# Patient Record
Sex: MALE | Race: OTHER | HISPANIC OR LATINO | URBAN - METROPOLITAN AREA
[De-identification: names, ages, dates, MRNs, and addresses within clinical notes are randomized per-mention and may not be internally consistent; named-entity substitution may affect disease eponyms.]

---

## 2014-02-01 RX ORDER — LOSARTAN POTASSIUM 100 MG/1
1 TABLET, FILM COATED ORAL
Qty: 0 | Refills: 0 | DISCHARGE
Start: 2014-02-01

## 2014-02-01 RX ORDER — CARVEDILOL PHOSPHATE 80 MG/1
1 CAPSULE, EXTENDED RELEASE ORAL
Qty: 0 | Refills: 0 | DISCHARGE
Start: 2014-02-01

## 2018-01-10 ENCOUNTER — INPATIENT (INPATIENT)
Facility: HOSPITAL | Age: 58
LOS: 7 days | Discharge: HOME | End: 2018-01-18
Attending: INTERNAL MEDICINE | Admitting: INTERNAL MEDICINE
Payer: MEDICAID

## 2018-01-10 VITALS
SYSTOLIC BLOOD PRESSURE: 119 MMHG | HEART RATE: 65 BPM | TEMPERATURE: 98 F | DIASTOLIC BLOOD PRESSURE: 66 MMHG | RESPIRATION RATE: 16 BRPM | OXYGEN SATURATION: 100 %

## 2018-01-10 DIAGNOSIS — I25.10 ATHEROSCLEROTIC HEART DISEASE OF NATIVE CORONARY ARTERY WITHOUT ANGINA PECTORIS: ICD-10-CM

## 2018-01-10 DIAGNOSIS — Z29.9 ENCOUNTER FOR PROPHYLACTIC MEASURES, UNSPECIFIED: ICD-10-CM

## 2018-01-10 DIAGNOSIS — E78.5 HYPERLIPIDEMIA, UNSPECIFIED: ICD-10-CM

## 2018-01-10 DIAGNOSIS — R55 SYNCOPE AND COLLAPSE: ICD-10-CM

## 2018-01-10 DIAGNOSIS — I10 ESSENTIAL (PRIMARY) HYPERTENSION: ICD-10-CM

## 2018-01-10 DIAGNOSIS — R06.02 SHORTNESS OF BREATH: ICD-10-CM

## 2018-01-10 LAB
ALBUMIN SERPL ELPH-MCNC: 4.4 G/DL — SIGNIFICANT CHANGE UP (ref 3.3–5)
ALP SERPL-CCNC: 63 U/L — SIGNIFICANT CHANGE UP (ref 40–120)
ALT FLD-CCNC: 19 U/L — SIGNIFICANT CHANGE UP (ref 4–41)
APTT BLD: 37.6 SEC — HIGH (ref 27.5–37.4)
AST SERPL-CCNC: 22 U/L — SIGNIFICANT CHANGE UP (ref 4–40)
BASOPHILS # BLD AUTO: 0.04 K/UL — SIGNIFICANT CHANGE UP (ref 0–0.2)
BASOPHILS NFR BLD AUTO: 0.6 % — SIGNIFICANT CHANGE UP (ref 0–2)
BILIRUB SERPL-MCNC: 0.8 MG/DL — SIGNIFICANT CHANGE UP (ref 0.2–1.2)
BUN SERPL-MCNC: 19 MG/DL — SIGNIFICANT CHANGE UP (ref 7–23)
CALCIUM SERPL-MCNC: 9.1 MG/DL — SIGNIFICANT CHANGE UP (ref 8.4–10.5)
CHLORIDE SERPL-SCNC: 101 MMOL/L — SIGNIFICANT CHANGE UP (ref 98–107)
CK MB BLD-MCNC: 1.12 NG/ML — SIGNIFICANT CHANGE UP (ref 1–6.6)
CK MB BLD-MCNC: 1.16 NG/ML — SIGNIFICANT CHANGE UP (ref 1–6.6)
CK MB BLD-MCNC: SIGNIFICANT CHANGE UP (ref 0–2.5)
CK SERPL-CCNC: 76 U/L — SIGNIFICANT CHANGE UP (ref 30–200)
CK SERPL-CCNC: 78 U/L — SIGNIFICANT CHANGE UP (ref 30–200)
CO2 SERPL-SCNC: 27 MMOL/L — SIGNIFICANT CHANGE UP (ref 22–31)
CREAT SERPL-MCNC: 1.22 MG/DL — SIGNIFICANT CHANGE UP (ref 0.5–1.3)
D DIMER BLD IA.RAPID-MCNC: < 150 NG/ML — SIGNIFICANT CHANGE UP
EOSINOPHIL # BLD AUTO: 0.14 K/UL — SIGNIFICANT CHANGE UP (ref 0–0.5)
EOSINOPHIL NFR BLD AUTO: 2.1 % — SIGNIFICANT CHANGE UP (ref 0–6)
GLUCOSE SERPL-MCNC: 90 MG/DL — SIGNIFICANT CHANGE UP (ref 70–99)
HCT VFR BLD CALC: 41.3 % — SIGNIFICANT CHANGE UP (ref 39–50)
HGB BLD-MCNC: 13.7 G/DL — SIGNIFICANT CHANGE UP (ref 13–17)
IMM GRANULOCYTES # BLD AUTO: 0.02 # — SIGNIFICANT CHANGE UP
IMM GRANULOCYTES NFR BLD AUTO: 0.3 % — SIGNIFICANT CHANGE UP (ref 0–1.5)
INR BLD: 1.1 — SIGNIFICANT CHANGE UP (ref 0.88–1.17)
LYMPHOCYTES # BLD AUTO: 1.56 K/UL — SIGNIFICANT CHANGE UP (ref 1–3.3)
LYMPHOCYTES # BLD AUTO: 23.6 % — SIGNIFICANT CHANGE UP (ref 13–44)
MCHC RBC-ENTMCNC: 28.5 PG — SIGNIFICANT CHANGE UP (ref 27–34)
MCHC RBC-ENTMCNC: 33.2 % — SIGNIFICANT CHANGE UP (ref 32–36)
MCV RBC AUTO: 86 FL — SIGNIFICANT CHANGE UP (ref 80–100)
MONOCYTES # BLD AUTO: 0.53 K/UL — SIGNIFICANT CHANGE UP (ref 0–0.9)
MONOCYTES NFR BLD AUTO: 8 % — SIGNIFICANT CHANGE UP (ref 2–14)
NEUTROPHILS # BLD AUTO: 4.33 K/UL — SIGNIFICANT CHANGE UP (ref 1.8–7.4)
NEUTROPHILS NFR BLD AUTO: 65.4 % — SIGNIFICANT CHANGE UP (ref 43–77)
NRBC # FLD: 0 — SIGNIFICANT CHANGE UP
NT-PROBNP SERPL-SCNC: 21.16 PG/ML — SIGNIFICANT CHANGE UP
PLATELET # BLD AUTO: 190 K/UL — SIGNIFICANT CHANGE UP (ref 150–400)
PMV BLD: 12.3 FL — SIGNIFICANT CHANGE UP (ref 7–13)
POTASSIUM SERPL-MCNC: 4.6 MMOL/L — SIGNIFICANT CHANGE UP (ref 3.5–5.3)
POTASSIUM SERPL-SCNC: 4.6 MMOL/L — SIGNIFICANT CHANGE UP (ref 3.5–5.3)
PROT SERPL-MCNC: 7.1 G/DL — SIGNIFICANT CHANGE UP (ref 6–8.3)
PROTHROM AB SERPL-ACNC: 12.2 SEC — SIGNIFICANT CHANGE UP (ref 9.8–13.1)
RBC # BLD: 4.8 M/UL — SIGNIFICANT CHANGE UP (ref 4.2–5.8)
RBC # FLD: 12.8 % — SIGNIFICANT CHANGE UP (ref 10.3–14.5)
SODIUM SERPL-SCNC: 138 MMOL/L — SIGNIFICANT CHANGE UP (ref 135–145)
TROPONIN T SERPL-MCNC: < 0.06 NG/ML — SIGNIFICANT CHANGE UP (ref 0–0.06)
TROPONIN T SERPL-MCNC: < 0.06 NG/ML — SIGNIFICANT CHANGE UP (ref 0–0.06)
WBC # BLD: 6.62 K/UL — SIGNIFICANT CHANGE UP (ref 3.8–10.5)
WBC # FLD AUTO: 6.62 K/UL — SIGNIFICANT CHANGE UP (ref 3.8–10.5)

## 2018-01-10 PROCEDURE — 71046 X-RAY EXAM CHEST 2 VIEWS: CPT | Mod: 26

## 2018-01-10 PROCEDURE — 70450 CT HEAD/BRAIN W/O DYE: CPT | Mod: 26

## 2018-01-10 RX ORDER — HEPARIN SODIUM 5000 [USP'U]/ML
5000 INJECTION INTRAVENOUS; SUBCUTANEOUS EVERY 8 HOURS
Qty: 0 | Refills: 0 | Status: DISCONTINUED | OUTPATIENT
Start: 2018-01-10 | End: 2018-01-18

## 2018-01-10 RX ORDER — SODIUM CHLORIDE 9 MG/ML
3 INJECTION INTRAMUSCULAR; INTRAVENOUS; SUBCUTANEOUS EVERY 8 HOURS
Qty: 0 | Refills: 0 | Status: DISCONTINUED | OUTPATIENT
Start: 2018-01-10 | End: 2018-01-18

## 2018-01-10 RX ORDER — SIMVASTATIN 20 MG/1
40 TABLET, FILM COATED ORAL AT BEDTIME
Qty: 0 | Refills: 0 | Status: DISCONTINUED | OUTPATIENT
Start: 2018-01-10 | End: 2018-01-18

## 2018-01-10 RX ORDER — CARVEDILOL PHOSPHATE 80 MG/1
12.5 CAPSULE, EXTENDED RELEASE ORAL EVERY 12 HOURS
Qty: 0 | Refills: 0 | Status: DISCONTINUED | OUTPATIENT
Start: 2018-01-10 | End: 2018-01-18

## 2018-01-10 RX ORDER — LOSARTAN POTASSIUM 100 MG/1
100 TABLET, FILM COATED ORAL
Qty: 0 | Refills: 0 | Status: DISCONTINUED | OUTPATIENT
Start: 2018-01-10 | End: 2018-01-18

## 2018-01-10 RX ORDER — FUROSEMIDE 40 MG
20 TABLET ORAL DAILY
Qty: 0 | Refills: 0 | Status: DISCONTINUED | OUTPATIENT
Start: 2018-01-10 | End: 2018-01-18

## 2018-01-10 RX ORDER — AMLODIPINE BESYLATE 2.5 MG/1
5 TABLET ORAL
Qty: 0 | Refills: 0 | Status: DISCONTINUED | OUTPATIENT
Start: 2018-01-10 | End: 2018-01-18

## 2018-01-10 RX ORDER — CLOPIDOGREL BISULFATE 75 MG/1
75 TABLET, FILM COATED ORAL DAILY
Qty: 0 | Refills: 0 | Status: DISCONTINUED | OUTPATIENT
Start: 2018-01-10 | End: 2018-01-18

## 2018-01-10 RX ORDER — ASPIRIN/CALCIUM CARB/MAGNESIUM 324 MG
81 TABLET ORAL DAILY
Qty: 0 | Refills: 0 | Status: DISCONTINUED | OUTPATIENT
Start: 2018-01-10 | End: 2018-01-18

## 2018-01-10 RX ORDER — ISOSORBIDE MONONITRATE 60 MG/1
30 TABLET, EXTENDED RELEASE ORAL
Qty: 0 | Refills: 0 | Status: DISCONTINUED | OUTPATIENT
Start: 2018-01-10 | End: 2018-01-18

## 2018-01-10 RX ORDER — FAMOTIDINE 10 MG/ML
20 INJECTION INTRAVENOUS DAILY
Qty: 0 | Refills: 0 | Status: DISCONTINUED | OUTPATIENT
Start: 2018-01-10 | End: 2018-01-18

## 2018-01-10 RX ORDER — ACETAMINOPHEN 500 MG
650 TABLET ORAL ONCE
Qty: 0 | Refills: 0 | Status: DISCONTINUED | OUTPATIENT
Start: 2018-01-10 | End: 2018-01-18

## 2018-01-10 RX ADMIN — SIMVASTATIN 40 MILLIGRAM(S): 20 TABLET, FILM COATED ORAL at 22:28

## 2018-01-10 RX ADMIN — CARVEDILOL PHOSPHATE 12.5 MILLIGRAM(S): 80 CAPSULE, EXTENDED RELEASE ORAL at 22:28

## 2018-01-10 RX ADMIN — SODIUM CHLORIDE 3 MILLILITER(S): 9 INJECTION INTRAMUSCULAR; INTRAVENOUS; SUBCUTANEOUS at 23:08

## 2018-01-10 RX ADMIN — HEPARIN SODIUM 5000 UNIT(S): 5000 INJECTION INTRAVENOUS; SUBCUTANEOUS at 22:29

## 2018-01-10 RX ADMIN — LOSARTAN POTASSIUM 100 MILLIGRAM(S): 100 TABLET, FILM COATED ORAL at 22:28

## 2018-01-10 NOTE — ED ADULT NURSE NOTE - OBJECTIVE STATEMENT
Pt presents to room 16, A&OX3, ambulatory at baseline without assistance, coming in for evaluation of dyspnea on exertion, bilateral arm and leg numbness/tingling/weakness x 3 weeks, chest pain intermittently for 3 weeks, and dizziness x 3 days. pt has a hx of HTN, CAD s/p stent x 1, cardiac cath x 4, and HLD. States dizziness has gotten progressively worse and he is now having trouble ambulating. IV established in left ac with a 20g, labs drawn and sent, call bell in reach, side rails up, bed in locked position, md evaluation in progress, pt on telemetry-NSR @ 66 noted, will continue to monitor.

## 2018-01-10 NOTE — ED PROVIDER NOTE - OBJECTIVE STATEMENT
58 yo male with PMHX HTN, CAD stented x4/5, ~ EF 23%, HLD presents to the ED c/o dizziness, and generalized weakness of his UE and LE b/l for 3 weeks worsening over the past 3 days. Pt saw his PCP, lab work was done and patient was told it was normal, Pt referred to see his cardiologist, next Monday, but did not want to wait to see them. Pt denies chest pain, sob, cough, fever, chills, syncope, abdominal pain, diarrhea, urinary symptoms, 56 yo male with PMHx HTN, CAD stented x4/5, ~ EF 23%, HLD presents to the ED c/o dizziness, and generalized weakness of his UE and LE b/l for 3 weeks worsening over the past 3 days. Pt saw his PCP, lab work was done and patient was told it was normal, Pt referred to see his cardiologist, next Monday, but did not want to wait to see them. Pt denies chest pain, sob, cough, fever, chills, syncope, abdominal pain, diarrhea, urinary symptoms, recent travel. 56 yo male with PMHx HTN, CAD stented x4/5, ~ EF 23%, HLD presents to the ED c/o dizziness, and generalized weakness of his UE and LE b/l for 3 weeks worsening over the past 3 days. Pt states dizziness comes when he is sitting or standing, comes intermittently with no exacerbating, or relieving factors. "random". Pt saw his PCP, lab work was done and patient was told it was normal, Pt referred to see his cardiologist, next Monday, but did not want to wait to see them. Pt denies chest pain, sob, cough, fever, chills, syncope, abdominal pain, diarrhea, urinary symptoms, recent travel. 56 yo male with PMHx HTN, CAD stented x4/5, ~ EF 23%, HLD presents to the ED c/o dizziness, and generalized weakness of his UE and LE b/l for 3 weeks worsening over the past 3 days. Pt states dizziness comes when he is sitting or standing, comes intermittently with no exacerbating, or relieving factors. "random". Pt saw his PCP, lab work was done and patient was told it was normal, Pt referred to see his cardiologist, next Monday, but did not want to wait to see them. Pt denies chest pain, sob, cough, fever, chills, syncope, abdominal pain, diarrhea, urinary symptoms, recent travel.  Cardiologist John Cain

## 2018-01-10 NOTE — H&P ADULT - PROBLEM SELECTOR PLAN 1
Admit to tele  check cbc, bmp, a1c, flp, tsh, trend CE  Echo ordered   Check orthostatics  Fall risk  f/u CTH results  Neuro checks  f/u MD note

## 2018-01-10 NOTE — ED ADULT TRIAGE NOTE - CHIEF COMPLAINT QUOTE
Pt c/o dizziness and numbness to b/l arms and legs x 3 weeks. Saw primary care MD and had normal work up. Pt was advised to go to cardiologist for stress test but pt stated he couldn't wait. C/O intermittent CP with SOB. Denies any n/v. HX cardiac stents

## 2018-01-10 NOTE — ED PROVIDER NOTE - PHYSICAL EXAMINATION
neuro exam normal:  CN II-XII normal.  5/5 UE and LE strength.  Rapid alternating movements intact.  Negative Rhomberg.  Negative Pronator drift.  Ambulatory without assistance.  Sensation intact to light touch.

## 2018-01-10 NOTE — H&P ADULT - NSHPSOCIALHISTORY_GEN_ALL_CORE
Previous smoker. Quit Smoking 16 years ago. Smoked for approximately for 20 years 1-2 PPD every 2-3 days.   Denies alcohol or drug use.     Works as a delivery malik.

## 2018-01-10 NOTE — H&P ADULT - FAMILY HISTORY
Family history of prostate cancer in father     Mother  Still living? Unknown  Family history of heart disease, Age at diagnosis: Age Unknown

## 2018-01-10 NOTE — ED PROVIDER NOTE - MEDICAL DECISION MAKING DETAILS
56 yo male with PMHx HTN, CAD stented x4/5, ~ EF 23%, HLD presents to the ED c/o dizziness, and generalized weakness of his UE and LE b/l for 3 weeks worsening over the past 3 days. NO focal neuro deficits.   Plan: ekg, exr, cbc, cmp, trop, ck,ckmb, monitor. Reassess.

## 2018-01-10 NOTE — H&P ADULT - NSHPLABSRESULTS_GEN_ALL_CORE
EKG: NSR 63 BPM, LBBB (Old), TWI in I, II, III, V6                          13.7   6.62  )-----------( 190      ( 10 Erick 2018 11:20 )             41.3   01-10    138  |  101  |  19  ----------------------------<  90  4.6   |  27  |  1.22    Ca    9.1      10 Erick 2018 11:20    TPro  7.1  /  Alb  4.4  /  TBili  0.8  /  DBili  x   /  AST  22  /  ALT  19  /  AlkPhos  63  01-10  CARDIAC MARKERS ( 10 Erick 2018 11:20 )  x     / < 0.06 ng/mL / 76 u/L / 1.16 ng/mL / x        < from: Xray Chest 2 Views PA/Lat (01.10.18 @ 11:27) >    IMPRESSION:  Clear lungs.No pleural effusions or pneumothorax.    Cardiac and mediastinal silhouettes within normal limits.    Trachea midline.    Unremarkable osseous structures.      < end of copied text >

## 2018-01-10 NOTE — ED PROVIDER NOTE - ATTENDING CONTRIBUTION TO CARE
Case of a 58 y/o male patient with past medical history of HTN, CAD stented x4/5, ~ EF 23%, HLD presenting to the ED complaining of dizziness, and generalized weakness. Patient explains that he gets tired when he goes up the stairs, like a fatigues feeling. Exam was within normal limits as well as labs. As patient is a high risk for ACS due to complaints and history will admit for further workup and management.

## 2018-01-10 NOTE — H&P ADULT - PMH
CAD (Coronary Artery Disease)    Coronary Stent  to LAD and D2 in 1/2011  proximal LCx, Distal LCx,  OM2, ramus intermedius, and diagonal stents  HTN (Hypertension)    Hyperlipidemia

## 2018-01-10 NOTE — H&P ADULT - ASSESSMENT
56 y/o M with hx of HTN, CAD s/p proximal LCx, Distal LCx,  OM2, ramus intermedius, and diagonal stents, HLD presents with dizziness x 2-3 days. admitted for near syncope / r/o ACS

## 2018-01-10 NOTE — H&P ADULT - ATTENDING COMMENTS
Patient seen and examined.  Agree with above.   -ELMO with 3 sets CE  -CHeck TTE  -Neuro eval with Dr. Franklin  -further workup including ischemic eval pending above    Sophie Pollard MD  [facility]  [address] [place] [address], [id number]  [place] [address]  office: [phone]  pager: [phone]

## 2018-01-10 NOTE — H&P ADULT - HISTORY OF PRESENT ILLNESS
58 y/o M with hx of HTN, CAD s/p proximal LCx, Distal LCx,  OM2, ramus intermedius, and diagonal stents, HLD presents with dizziness x 2-3 days. Patient states dizziness is intermittent, and randomly. He cannot recall what exacerbates or relieves the dizziness. He also noticed that he has SOB with exertion with 1 flight of stairs or walking up to 1 block x 2-3 weeks, which is new for him. He also complains of b/l LE and UE weakness with the dizziness. He saw his PMD for these symptoms and had normal blood work. He planned to see his cardiologist on Monday, but the dizziness was worsening so he came to ED for further eval. Denies fever, chills, cough, falls, LOC, chest pain, melena, hematochezia, abdominal pain, diarrhea, constipation, LE edema, calf tenderness or dysuria. 56 y/o M with hx of HTN, CAD s/p proximal LCx, Distal LCx,  OM2, ramus intermedius, and diagonal stents, HLD presents with dizziness x 2-3 days. Patient states dizziness is intermittent, and random onset. He cannot recall what exacerbates or relieves the dizziness. He also noticed that he has SOB with exertion with 1 flight of stairs or walking up to 1 block x 2-3 weeks, which is new for him. He also complains of b/l LE and UE weakness with the dizziness. He saw his PMD for these symptoms and had normal blood work. He planned to see his cardiologist on Monday, but the dizziness was worsening so he came to ED for further eval. Denies fever, chills, cough, falls, LOC, chest pain, melena, hematochezia, abdominal pain, diarrhea, constipation, LE edema, calf tenderness or dysuria.

## 2018-01-10 NOTE — H&P ADULT - PROBLEM SELECTOR PLAN 2
Admit to tele to r/o ACS  Check cbc, bmp, a1c, flp, tsh, trend CE, D-Dimer and Pro BNP  Appears euvolemic on exam  Echo ordered   NST v Cardiac cath pending echo results  cont asa, plavix

## 2018-01-10 NOTE — ED ADULT NURSE NOTE - PMH
CAD (Coronary Artery Disease)    Coronary Stent  to LAD and D2 in 1/2011  HTN (Hypertension)    Hyperlipidemia

## 2018-01-11 LAB
BASOPHILS # BLD AUTO: 0.04 K/UL — SIGNIFICANT CHANGE UP (ref 0–0.2)
BASOPHILS NFR BLD AUTO: 0.6 % — SIGNIFICANT CHANGE UP (ref 0–2)
BUN SERPL-MCNC: 19 MG/DL — SIGNIFICANT CHANGE UP (ref 7–23)
CALCIUM SERPL-MCNC: 9.3 MG/DL — SIGNIFICANT CHANGE UP (ref 8.4–10.5)
CHLORIDE SERPL-SCNC: 102 MMOL/L — SIGNIFICANT CHANGE UP (ref 98–107)
CHOLEST SERPL-MCNC: 124 MG/DL — SIGNIFICANT CHANGE UP (ref 120–199)
CO2 SERPL-SCNC: 26 MMOL/L — SIGNIFICANT CHANGE UP (ref 22–31)
CREAT SERPL-MCNC: 1.26 MG/DL — SIGNIFICANT CHANGE UP (ref 0.5–1.3)
EOSINOPHIL # BLD AUTO: 0.2 K/UL — SIGNIFICANT CHANGE UP (ref 0–0.5)
EOSINOPHIL NFR BLD AUTO: 2.9 % — SIGNIFICANT CHANGE UP (ref 0–6)
GLUCOSE SERPL-MCNC: 103 MG/DL — HIGH (ref 70–99)
HBA1C BLD-MCNC: 5.8 % — HIGH (ref 4–5.6)
HCT VFR BLD CALC: 44.1 % — SIGNIFICANT CHANGE UP (ref 39–50)
HDLC SERPL-MCNC: 43 MG/DL — SIGNIFICANT CHANGE UP (ref 35–55)
HGB BLD-MCNC: 15 G/DL — SIGNIFICANT CHANGE UP (ref 13–17)
IMM GRANULOCYTES # BLD AUTO: 0.03 # — SIGNIFICANT CHANGE UP
IMM GRANULOCYTES NFR BLD AUTO: 0.4 % — SIGNIFICANT CHANGE UP (ref 0–1.5)
LIPID PNL WITH DIRECT LDL SERPL: 64 MG/DL — SIGNIFICANT CHANGE UP
LYMPHOCYTES # BLD AUTO: 1.58 K/UL — SIGNIFICANT CHANGE UP (ref 1–3.3)
LYMPHOCYTES # BLD AUTO: 22.5 % — SIGNIFICANT CHANGE UP (ref 13–44)
MAGNESIUM SERPL-MCNC: 2.1 MG/DL — SIGNIFICANT CHANGE UP (ref 1.6–2.6)
MCHC RBC-ENTMCNC: 29.5 PG — SIGNIFICANT CHANGE UP (ref 27–34)
MCHC RBC-ENTMCNC: 34 % — SIGNIFICANT CHANGE UP (ref 32–36)
MCV RBC AUTO: 86.8 FL — SIGNIFICANT CHANGE UP (ref 80–100)
MONOCYTES # BLD AUTO: 0.54 K/UL — SIGNIFICANT CHANGE UP (ref 0–0.9)
MONOCYTES NFR BLD AUTO: 7.7 % — SIGNIFICANT CHANGE UP (ref 2–14)
NEUTROPHILS # BLD AUTO: 4.62 K/UL — SIGNIFICANT CHANGE UP (ref 1.8–7.4)
NEUTROPHILS NFR BLD AUTO: 65.9 % — SIGNIFICANT CHANGE UP (ref 43–77)
NRBC # FLD: 0 — SIGNIFICANT CHANGE UP
PHOSPHATE SERPL-MCNC: 3.6 MG/DL — SIGNIFICANT CHANGE UP (ref 2.5–4.5)
PLATELET # BLD AUTO: 193 K/UL — SIGNIFICANT CHANGE UP (ref 150–400)
PMV BLD: 12 FL — SIGNIFICANT CHANGE UP (ref 7–13)
POTASSIUM SERPL-MCNC: 4.3 MMOL/L — SIGNIFICANT CHANGE UP (ref 3.5–5.3)
POTASSIUM SERPL-SCNC: 4.3 MMOL/L — SIGNIFICANT CHANGE UP (ref 3.5–5.3)
RBC # BLD: 5.08 M/UL — SIGNIFICANT CHANGE UP (ref 4.2–5.8)
RBC # FLD: 12.8 % — SIGNIFICANT CHANGE UP (ref 10.3–14.5)
SODIUM SERPL-SCNC: 142 MMOL/L — SIGNIFICANT CHANGE UP (ref 135–145)
TRIGL SERPL-MCNC: 137 MG/DL — SIGNIFICANT CHANGE UP (ref 10–149)
TSH SERPL-MCNC: 2.6 UIU/ML — SIGNIFICANT CHANGE UP (ref 0.27–4.2)
WBC # BLD: 7.01 K/UL — SIGNIFICANT CHANGE UP (ref 3.8–10.5)
WBC # FLD AUTO: 7.01 K/UL — SIGNIFICANT CHANGE UP (ref 3.8–10.5)

## 2018-01-11 PROCEDURE — 93306 TTE W/DOPPLER COMPLETE: CPT | Mod: 26

## 2018-01-11 RX ORDER — ACETAMINOPHEN 500 MG
650 TABLET ORAL EVERY 6 HOURS
Qty: 0 | Refills: 0 | Status: DISCONTINUED | OUTPATIENT
Start: 2018-01-11 | End: 2018-01-18

## 2018-01-11 RX ADMIN — SODIUM CHLORIDE 3 MILLILITER(S): 9 INJECTION INTRAMUSCULAR; INTRAVENOUS; SUBCUTANEOUS at 23:05

## 2018-01-11 RX ADMIN — FAMOTIDINE 20 MILLIGRAM(S): 10 INJECTION INTRAVENOUS at 11:42

## 2018-01-11 RX ADMIN — ISOSORBIDE MONONITRATE 30 MILLIGRAM(S): 60 TABLET, EXTENDED RELEASE ORAL at 11:42

## 2018-01-11 RX ADMIN — SIMVASTATIN 40 MILLIGRAM(S): 20 TABLET, FILM COATED ORAL at 23:06

## 2018-01-11 RX ADMIN — CLOPIDOGREL BISULFATE 75 MILLIGRAM(S): 75 TABLET, FILM COATED ORAL at 11:42

## 2018-01-11 RX ADMIN — AMLODIPINE BESYLATE 5 MILLIGRAM(S): 2.5 TABLET ORAL at 11:42

## 2018-01-11 RX ADMIN — HEPARIN SODIUM 5000 UNIT(S): 5000 INJECTION INTRAVENOUS; SUBCUTANEOUS at 05:57

## 2018-01-11 RX ADMIN — Medication 650 MILLIGRAM(S): at 18:48

## 2018-01-11 RX ADMIN — LOSARTAN POTASSIUM 100 MILLIGRAM(S): 100 TABLET, FILM COATED ORAL at 17:46

## 2018-01-11 RX ADMIN — Medication 20 MILLIGRAM(S): at 05:56

## 2018-01-11 RX ADMIN — CARVEDILOL PHOSPHATE 12.5 MILLIGRAM(S): 80 CAPSULE, EXTENDED RELEASE ORAL at 17:20

## 2018-01-11 RX ADMIN — Medication 81 MILLIGRAM(S): at 11:42

## 2018-01-11 RX ADMIN — Medication 650 MILLIGRAM(S): at 17:57

## 2018-01-11 RX ADMIN — HEPARIN SODIUM 5000 UNIT(S): 5000 INJECTION INTRAVENOUS; SUBCUTANEOUS at 14:42

## 2018-01-11 RX ADMIN — CARVEDILOL PHOSPHATE 12.5 MILLIGRAM(S): 80 CAPSULE, EXTENDED RELEASE ORAL at 05:57

## 2018-01-11 RX ADMIN — SODIUM CHLORIDE 3 MILLILITER(S): 9 INJECTION INTRAMUSCULAR; INTRAVENOUS; SUBCUTANEOUS at 11:39

## 2018-01-11 NOTE — CONSULT NOTE ADULT - SUBJECTIVE AND OBJECTIVE BOX
[facility] Neurological [facility]), [facility]        Patient is a 57y old  Male who presents with a chief complaint of Dizziness (10 Erick 2018 19:56)      HPI:  56 y/o M with hx of HTN, CAD s/p proximal LCx, Distal LCx,  OM2, ramus intermedius, and diagonal stents, HLD presents with dizziness x 2-3 days. Patient states dizziness is intermittent, and random onset. He cannot recall what exacerbates or relieves the dizziness. He also noticed that he has SOB with exertion with 1 flight of stairs or walking up to 1 block x 2-3 weeks, which is new for him. He also complains of b/l LE and UE weakness with the dizziness. He saw his PMD for these symptoms and had normal blood work. He planned to see his cardiologist on Monday, but the dizziness was worsening so he came to ED for further eval. Denies fever, chills, cough, falls, LOC, chest pain, melena, hematochezia, abdominal pain, diarrhea, constipation, LE edema, calf tenderness or dysuria. (10 Erick 2018 19:56)           *****PAST MEDICAL / Surgical  HISTORY:  PAST MEDICAL & SURGICAL HISTORY:  Hyperlipidemia  Coronary Stent: to LAD and D2 in 1/2011  proximal LCx, Distal LCx,  OM2, ramus intermedius, and diagonal stents  CAD (Coronary Artery Disease)  HTN (Hypertension)  Stented coronary artery: x4           *****FAMILY HISTORY:  FAMILY HISTORY:  Family history of prostate cancer in father  Family history of heart disease (Mother)           *****SOCIAL HISTORY:  Alcohol: None  Smoking: None         *****ALLERGIES:   Allergies    No Known Allergies    Intolerances             *****MEDICATIONS: current medication reviewed and documented.   MEDICATIONS  (STANDING):  amLODIPine   Tablet 5 milliGRAM(s) Oral <User Schedule>  aspirin enteric coated 81 milliGRAM(s) Oral daily  carvedilol 12.5 milliGRAM(s) Oral every 12 hours  clopidogrel Tablet 75 milliGRAM(s) Oral daily  famotidine    Tablet 20 milliGRAM(s) Oral daily  furosemide    Tablet 20 milliGRAM(s) Oral daily  heparin  Injectable 5000 Unit(s) SubCutaneous every 8 hours  isosorbide   mononitrate ER Tablet (IMDUR) 30 milliGRAM(s) Oral <User Schedule>  losartan 100 milliGRAM(s) Oral <User Schedule>  simvastatin 40 milliGRAM(s) Oral at bedtime  sodium chloride 0.9% lock flush 3 milliLiter(s) IV Push every 8 hours    MEDICATIONS  (PRN):  acetaminophen   Tablet. 650 milliGRAM(s) Oral once PRN Mild/moderate/severe pain           *****REVIEW OF SYSTEM:  GEN: no fever, no chills, no pain  RESP: no SOB, no cough, no sputum  CVS: no chest pain, no palpitations, no edema  GI: no abdominal pain, no nausea, no vomiting, no constipation, no diarrhea  : no dysurea, no frequency, no hematurea  Neuro: no headache, no dizziness  PSYCH: no anxiety, no depression  Derm : no itching, no rash         *****VITAL SIGNS:  T(C): 37 (01-11-18 @ 05:52), Max: 37 (01-11-18 @ 05:52)  HR: 64 (01-11-18 @ 05:52) (54 - 72)  BP: 123/76 (01-11-18 @ 05:52) (111/53 - 134/78)  RR: 18 (01-11-18 @ 05:52) (16 - 18)  SpO2: 99% (01-11-18 @ 05:52) (98% - 99%)  Wt(kg): --           *****PHYSICAL EXAM:  GEN: A&O X 3 , NAD , comfortable  HEENT: NCAT, PERRL, MMM, hearing intact  Neck: supple , no JVD  CVS: S1S2 , regular , No M/R/G appreciated  PULM: CTA B/L,  no W/R/R appreciated  ABD.: soft. non tender, non distended,  bowel sounds present  Extrem: intact pulses , no edema   Derm: No rash , no ecchymoses  PSYCH : normal mood,  no delusion not anxious      Alert oriented x 3   Attention comprehension are fair. Able to name, repeat, read without any difficulty.   Able to follow 3 step commands.     EOMI fundi not visualized,  VFF to confrontration  No facial asymmetry   Tongue is midline   Palate elevates symmetrically   Moving all 4 ext symmetrically no pronator drift.  Reflexes are symmetric throughout   sensation is grossly symmetric  Gait : not assessed.  B/L down going toes        >>> <<<         *****LAB AND IMAGING:                          15.0   7.01  )-----------( 193      ( 11 Jan 2018 05:50 )             44.1               01-11    142  |  102  |  19  ----------------------------<  103<H>  4.3   |  26  |  1.26    Ca    9.3      11 Jan 2018 05:50  Phos  3.6     01-11  Mg     2.1     01-11    TPro  7.1  /  Alb  4.4  /  TBili  0.8  /  DBili  x   /  AST  22  /  ALT  19  /  AlkPhos  63  01-10    PT/INR - ( 10 Erick 2018 11:20 )   PT: 12.2 SEC;   INR: 1.10          PTT - ( 10 Erick 2018 11:20 )  PTT:37.6 SEC            CARDIAC MARKERS ( 10 Erick 2018 21:04 )  x     / < 0.06 ng/mL / 78 u/L / 1.12 ng/mL / x      CARDIAC MARKERS ( 10 Erick 2018 11:20 )  x     / < 0.06 ng/mL / 76 u/L / 1.16 ng/mL / x                      [All pertinent recent Imaging reports reviewed]         *****A S S E S S M E N T   A N D   P L A N :            80 minutes spent on the total encounter;  more than 50 % of the visit was spent on counseling  and or coordinating care by the attending physician.    Thank you for allowing me to participate in the care of this hardik patient. Please do not hesitate to call me if you have any questions.   ___________________________  Will follow with you.  Thank you,  Valeri Franklin MD  Diplomate of the American Board of Neurology and Psychiatry.  Diplomate of the American Board of Vascular Neurology.   [facility] ([facility]), [facility]   Ph: [phone]      This note was partially created using voice recognition software and is inherently subject to errors including those of syntax and sound alike substitutions which may escape proofreading. In such instances original meaning may be extrapolated by contextual derivation. [facility]rological [facility]), [facility]        Patient is a 57y old  Male who presents with a chief complaint of Dizziness (10 Erick 2018 19:56)      HPI:  56 y/o M with hx of HTN, CAD s/p proximal LCx, Distal LCx,  OM2, ramus intermedius, and diagonal stents, HLD presents with dizziness x 2-3 days.  Patient states dizziness was brought on by exertional activity like going up stairs. He reports that his concern is that he loses power over all 4 ext, and is sob with exertion with 1 flight of stairs or walking up to 1 block x 2-3 weeks, which is new for him. He also complains of b/l LE and UE weakness with the dizziness. He saw his PMD for these symptoms and had normal blood work. He planned to see his cardiologist on Monday, but the dizziness was worsening so he came to ED for further eval. Denies fever, chills, cough, falls, LOC, chest pain, melena, hematochezia, abdominal pain, diarrhea, constipation, LE edema, calf tenderness or dysuria.  Denies any headache or other neurologic symptoms.          *****PAST MEDICAL / Surgical  HISTORY:  PAST MEDICAL & SURGICAL HISTORY:  Hyperlipidemia  Coronary Stent: to LAD and D2 in 1/2011  proximal LCx, Distal LCx,  OM2, ramus intermedius, and diagonal stents  CAD (Coronary Artery Disease)  HTN (Hypertension)  Stented coronary artery: x4           *****FAMILY HISTORY:  FAMILY HISTORY:  Family history of prostate cancer in father  Family history of heart disease (Mother)           *****SOCIAL HISTORY:  Alcohol: None  Smoking: None         *****ALLERGIES:   Allergies    No Known Allergies    Intolerances             *****MEDICATIONS: current medication reviewed and documented.   MEDICATIONS  (STANDING):  amLODIPine   Tablet 5 milliGRAM(s) Oral <User Schedule>  aspirin enteric coated 81 milliGRAM(s) Oral daily  carvedilol 12.5 milliGRAM(s) Oral every 12 hours  clopidogrel Tablet 75 milliGRAM(s) Oral daily  famotidine    Tablet 20 milliGRAM(s) Oral daily  furosemide    Tablet 20 milliGRAM(s) Oral daily  heparin  Injectable 5000 Unit(s) SubCutaneous every 8 hours  isosorbide   mononitrate ER Tablet (IMDUR) 30 milliGRAM(s) Oral <User Schedule>  losartan 100 milliGRAM(s) Oral <User Schedule>  simvastatin 40 milliGRAM(s) Oral at bedtime  sodium chloride 0.9% lock flush 3 milliLiter(s) IV Push every 8 hours    MEDICATIONS  (PRN):  acetaminophen   Tablet. 650 milliGRAM(s) Oral once PRN Mild/moderate/severe pain           *****REVIEW OF SYSTEM:  GEN: no fever, no chills, no pain  RESP: no SOB, no cough, no sputum  CVS: no chest pain, no palpitations, no edema  GI: no abdominal pain, no nausea, no vomiting, no constipation, no diarrhea  : no dysurea, no frequency, no hematurea  Neuro: no headache, no dizziness  PSYCH: no anxiety, no depression  Derm : no itching, no rash         *****VITAL SIGNS:  T(C): 37 (01-11-18 @ 05:52), Max: 37 (01-11-18 @ 05:52)  HR: 64 (01-11-18 @ 05:52) (54 - 72)  BP: 123/76 (01-11-18 @ 05:52) (111/53 - 134/78)  RR: 18 (01-11-18 @ 05:52) (16 - 18)  SpO2: 99% (01-11-18 @ 05:52) (98% - 99%)  Wt(kg): --           *****PHYSICAL EXAM:  GEN: A&O X 3 , NAD , comfortable  HEENT: NCAT, PERRL, MMM, hearing intact  Neck: supple , no JVD  CVS: S1S2 , regular , No M/R/G appreciated  PULM: CTA B/L,  no W/R/R appreciated  ABD.: soft. non tender, non distended,  bowel sounds present  Extrem: intact pulses , no edema   Derm: No rash , no ecchymoses  PSYCH : normal mood,  no delusion not anxious      Alert oriented x 3   Attention comprehension are fair. Able to name, repeat, read without any difficulty.   Able to follow 3 step commands.     EOMI fundi not visualized,  VFF to confrontration  No facial asymmetry   Tongue is midline   Palate elevates symmetrically   Moving all 4 ext symmetrically 4/4 through out no pronator drift.     sensation is grossly symmetric  Gait : able to tandem forward and back without any problems.   B/L down going toes        >>> <<<         *****LAB AND IMAGING:                          15.0   7.01  )-----------( 193      ( 11 Jan 2018 05:50 )             44.1               01-11    142  |  102  |  19  ----------------------------<  103<H>  4.3   |  26  |  1.26    Ca    9.3      11 Jan 2018 05:50  Phos  3.6     01-11  Mg     2.1     01-11    TPro  7.1  /  Alb  4.4  /  TBili  0.8  /  DBili  x   /  AST  22  /  ALT  19  /  AlkPhos  63  01-10    PT/INR - ( 10 Erick 2018 11:20 )   PT: 12.2 SEC;   INR: 1.10          PTT - ( 10 Erick 2018 11:20 )  PTT:37.6 SEC            CARDIAC MARKERS ( 10 Erick 2018 21:04 )  x     / < 0.06 ng/mL / 78 u/L / 1.12 ng/mL / x      CARDIAC MARKERS ( 10 Erick 2018 11:20 )  x     / < 0.06 ng/mL / 76 u/L / 1.16 ng/mL / x                  < from: CT Head No Cont (01.10.18 @ 17:17) >  IMPRESSION:    Normal non-contrast CT of the brain.    < end of copied text >      [All pertinent recent Imaging reports reviewed]         *****A S S E S S M E N T   A N D   P L A N :  56 y/o M with hx of HTN, CAD s/p proximal LCx, Distal LCx,  OM2, ramus intermedius, and diagonal stents, HLD presents with dizziness x 2-3 days.  Patient states dizziness which he describes as not vertigo, was brought on by exertional activity like going up stairs. He reports that his concern is that he loses power over all 4 ext, and is sob with exertion with 1 flight of stairs or walking up to 1 block x 2-3 weeks, which is new for him.     cardiac work up underway  Sinus girish on EKG.  appears to be exertional in nature. Since it is generalized weakness, does not localize to any particular hemisphere.  Will continue to monitor closely               80 minutes spent on the total encounter;  more than 50 % of the visit was spent on counseling  and or coordinating care by the attending physician.    Thank you for allowing me to participate in the care of this hardik patient. Please do not hesitate to call me if you have any questions.   ___________________________  Will follow with you.  Thank you,  Valeri Franklin MD  Diplomate of the American Board of Neurology and Psychiatry.  Diplomate of the American Board of Vascular Neurology.   [facility] ([facility]), [facility]   Ph: [phone]      This note was partially created using voice recognition software and is inherently subject to errors including those of syntax and sound alike substitutions which may escape proofreading. In such instances original meaning may be extrapolated by contextual derivation. [facility]rological [facility]), [facility]        Patient is a 57y old  Male who presents with a chief complaint of Dizziness (10 Erick 2018 19:56)      HPI:  58 y/o M with hx of HTN, CAD s/p proximal LCx, Distal LCx,  OM2, ramus intermedius, and diagonal stents, HLD presents with dizziness x 2-3 days.  Patient states dizziness was brought on by exertional activity like going up stairs. He reports that his concern is that he loses power over all 4 ext, and is sob with exertion with 1 flight of stairs or walking up to 1 block x 2-3 weeks, which is new for him. He also complains of b/l LE and UE weakness with the dizziness. He saw his PMD for these symptoms and had normal blood work. He planned to see his cardiologist on Monday, but the dizziness was worsening so he came to ED for further eval. Denies fever, chills, cough, falls, LOC, chest pain, melena, hematochezia, abdominal pain, diarrhea, constipation, LE edema, calf tenderness or dysuria.  Denies any headache or other neurologic symptoms.          *****PAST MEDICAL / Surgical  HISTORY:  PAST MEDICAL & SURGICAL HISTORY:  Hyperlipidemia  Coronary Stent: to LAD and D2 in 1/2011  proximal LCx, Distal LCx,  OM2, ramus intermedius, and diagonal stents  CAD (Coronary Artery Disease)  HTN (Hypertension)  Stented coronary artery: x4           *****FAMILY HISTORY:  FAMILY HISTORY:  Family history of prostate cancer in father  Family history of heart disease (Mother)           *****SOCIAL HISTORY:  Alcohol: None  Smoking: None         *****ALLERGIES:   Allergies    No Known Allergies    Intolerances             *****MEDICATIONS: current medication reviewed and documented.   MEDICATIONS  (STANDING):  amLODIPine   Tablet 5 milliGRAM(s) Oral <User Schedule>  aspirin enteric coated 81 milliGRAM(s) Oral daily  carvedilol 12.5 milliGRAM(s) Oral every 12 hours  clopidogrel Tablet 75 milliGRAM(s) Oral daily  famotidine    Tablet 20 milliGRAM(s) Oral daily  furosemide    Tablet 20 milliGRAM(s) Oral daily  heparin  Injectable 5000 Unit(s) SubCutaneous every 8 hours  isosorbide   mononitrate ER Tablet (IMDUR) 30 milliGRAM(s) Oral <User Schedule>  losartan 100 milliGRAM(s) Oral <User Schedule>  simvastatin 40 milliGRAM(s) Oral at bedtime  sodium chloride 0.9% lock flush 3 milliLiter(s) IV Push every 8 hours    MEDICATIONS  (PRN):  acetaminophen   Tablet. 650 milliGRAM(s) Oral once PRN Mild/moderate/severe pain           *****REVIEW OF SYSTEM:  GEN: no fever, no chills, no pain  RESP: no SOB, no cough, no sputum  CVS: no chest pain, no palpitations, no edema  GI: no abdominal pain, no nausea, no vomiting, no constipation, no diarrhea  : no dysurea, no frequency, no hematurea  Neuro: no headache, no dizziness  PSYCH: no anxiety, no depression  Derm : no itching, no rash         *****VITAL SIGNS:  T(C): 37 (01-11-18 @ 05:52), Max: 37 (01-11-18 @ 05:52)  HR: 64 (01-11-18 @ 05:52) (54 - 72)  BP: 123/76 (01-11-18 @ 05:52) (111/53 - 134/78)  RR: 18 (01-11-18 @ 05:52) (16 - 18)  SpO2: 99% (01-11-18 @ 05:52) (98% - 99%)  Wt(kg): --           *****PHYSICAL EXAM:  GEN: A&O X 3 , NAD , comfortable  HEENT: NCAT, PERRL, MMM, hearing intact  Neck: supple , no JVD  CVS: S1S2 , regular , No M/R/G appreciated  PULM: CTA B/L,  no W/R/R appreciated  ABD.: soft. non tender, non distended,  bowel sounds present  Extrem: intact pulses , no edema   Derm: No rash , no ecchymoses  PSYCH : normal mood,  no delusion not anxious      Alert oriented x 3   Attention comprehension are fair. Able to name, repeat, read without any difficulty.   Able to follow 3 step commands.     EOMI fundi not visualized,  VFF to confrontration  No facial asymmetry   Tongue is midline   Palate elevates symmetrically   Moving all 4 ext symmetrically 4/4 through out no pronator drift.     sensation is grossly symmetric  Gait : able to tandem forward and back without any problems.   B/L down going toes        >>> <<<         *****LAB AND IMAGING:                          15.0   7.01  )-----------( 193      ( 11 Jan 2018 05:50 )             44.1               01-11    142  |  102  |  19  ----------------------------<  103<H>  4.3   |  26  |  1.26    Ca    9.3      11 Jan 2018 05:50  Phos  3.6     01-11  Mg     2.1     01-11    TPro  7.1  /  Alb  4.4  /  TBili  0.8  /  DBili  x   /  AST  22  /  ALT  19  /  AlkPhos  63  01-10    PT/INR - ( 10 Erick 2018 11:20 )   PT: 12.2 SEC;   INR: 1.10          PTT - ( 10 Erick 2018 11:20 )  PTT:37.6 SEC            CARDIAC MARKERS ( 10 Erick 2018 21:04 )  x     / < 0.06 ng/mL / 78 u/L / 1.12 ng/mL / x      CARDIAC MARKERS ( 10 Erick 2018 11:20 )  x     / < 0.06 ng/mL / 76 u/L / 1.16 ng/mL / x                  < from: CT Head No Cont (01.10.18 @ 17:17) >  IMPRESSION:    Normal non-contrast CT of the brain.    < end of copied text >      [All pertinent recent Imaging reports reviewed]         *****A S S E S S M E N T   A N D   P L A N :  58 y/o M with hx of HTN, CAD s/p proximal LCx, Distal LCx,  OM2, ramus intermedius, and diagonal stents, HLD presents with dizziness x 2-3 days.  Patient states dizziness which he describes as not vertigo, was brought on by exertional activity like going up stairs. He reports that his concern is that he loses power over all 4 ext, and is sob with exertion with 1 flight of stairs or walking up to 1 block x 2-3 weeks, which is new for him.     cardiac work up underway  Sinus girish on EKG.  appears to be exertional in nature. Since it is generalized weakness, does not localize to any hemisphere although brainstem ischemia vs. c spine problem is a possibilty  Reviewed ct with special focus on the brainstem no abn seen.  Will continue to monitor closely.   If cardiac w/u is neg then can get further neurologic w/u mra of head to r/o post circulation problem., c-spine pathology             80 minutes spent on the total encounter;  more than 50 % of the visit was spent on counseling  and or coordinating care by the attending physician.    Thank you for allowing me to participate in the care of this hardik patient. Please do not hesitate to call me if you have any questions.   ___________________________  Will follow with you.  Thank you,  Valeri Franklin MD  Diplomate of the American Board of Neurology and Psychiatry.  Diplomate of the American Board of Vascular Neurology.   [facility] ([facility]), [facility]   Ph: [phone]      This note was partially created using voice recognition software and is inherently subject to errors including those of syntax and sound alike substitutions which may escape proofreading. In such instances original meaning may be extrapolated by contextual derivation.

## 2018-01-11 NOTE — PROGRESS NOTE ADULT - ATTENDING COMMENTS
Patient seen and examined.  Agree with above.  -TTE with severe LV dysfunction  -Given episodes of chest pain and severe LV dysfunction, will plan for cath tomorrow to further evaluate    Sophie Pollard MD  [facility]  [address] [place] [address], [id number]  [place] [address]  office: [phone]  pager: [phone]

## 2018-01-11 NOTE — CONSULT NOTE ADULT - ASSESSMENT
58 y/o M with hx of HTN, CAD s/p proximal LCx, Distal LCx,  OM2, ramus intermedius, and diagonal stents, HLD presents with dizziness x 2-3 days. admitted for near syncope / r/o ACS    Problem/Plan - 1:  ·  Problem: Near syncope.  Plan: TELEMONITOR  Cards and neuro fu  fall precautions  neuro checks  ischemia barragan as per cards  will follow     Problem/Plan - 2:  ·  Problem: Shortness of breath.  Plan: cont asa, plavix.   cards fu  ischemia barragan as per cards    Problem/Plan - 3:  ·  Problem: Hyperlipidemia.  Plan: cont statin.     Problem/Plan - 4:  ·  Problem: CAD (Coronary Artery Disease).  Plan: cont asa, plavix, statin, isosorbide.     Problem/Plan - 5:  ·  Problem: HTN (Hypertension).  Plan: cont home meds

## 2018-01-11 NOTE — PROGRESS NOTE ADULT - SUBJECTIVE AND OBJECTIVE BOX
SUBJECTIVE: no cp or SOB    MEDICATIONS  (STANDING):  amLODIPine   Tablet 5 milliGRAM(s) Oral <User Schedule>  aspirin enteric coated 81 milliGRAM(s) Oral daily  carvedilol 12.5 milliGRAM(s) Oral every 12 hours  clopidogrel Tablet 75 milliGRAM(s) Oral daily  famotidine    Tablet 20 milliGRAM(s) Oral daily  furosemide    Tablet 20 milliGRAM(s) Oral daily  heparin  Injectable 5000 Unit(s) SubCutaneous every 8 hours  isosorbide   mononitrate ER Tablet (IMDUR) 30 milliGRAM(s) Oral <User Schedule>  losartan 100 milliGRAM(s) Oral <User Schedule>  simvastatin 40 milliGRAM(s) Oral at bedtime  sodium chloride 0.9% lock flush 3 milliLiter(s) IV Push every 8 hours    LABS:                        15.0   7.01  )-----------( 193      ( 11 Jan 2018 05:50 )             44.1     142  |  102  |  19  ----------------------------<  103<H>  4.3   |  26  |  1.26    Ca    9.3      11 Jan 2018 05:50  Phos  3.6     01-11  Mg     2.1     01-11    TPro  7.1  /  Alb  4.4  /  TBili  0.8  /  DBili  x   /  AST  22  /  ALT  19  /  AlkPhos  63  01-10    CARDIAC MARKERS ( 10 Erick 2018 21:04 )  x     / < 0.06 ng/mL / 78 u/L / 1.12 ng/mL / x      CARDIAC MARKERS ( 10 Erick 2018 11:20 )  x     / < 0.06 ng/mL / 76 u/L / 1.16 ng/mL / x        Serum Pro-Brain Natriuretic Peptide: 21.16 pg/mL (01-10 @ 21:04)    PHYSICAL EXAM:  Vital Signs Last 24 Hrs  T(C): 36.9 (11 Jan 2018 11:40), Max: 37 (11 Jan 2018 05:52)  T(F): 98.5 (11 Jan 2018 11:40), Max: 98.6 (11 Jan 2018 05:52)  HR: 62 (11 Jan 2018 11:40) (54 - 68)  BP: 132/79 (11 Jan 2018 11:40) (111/53 - 132/79)  RR: 17 (11 Jan 2018 11:40) (16 - 18)  SpO2: 100% (11 Jan 2018 11:40) (98% - 100%)    Cardiovascular:  S1S2 RRR, No JVD, 1/6 VALERIA   Respiratory: Lungs clear to auscultation, normal effort  Gastrointestinal: Abdomen soft, ND, NT, +BS  Skin: Warm, dry, intact. No rash.  Ext: No C/C/E BL LE    DIAGNOSTIC DATA  TELEMETRY: NSR    < from: CT Head No Cont (01.10.18 @ 17:17) >    IMPRESSION:    Normal non-contrast CT of the brain.    < end of copied text >    < from: Cardiac Cath Lab (01.31.14 @ 13:55) >  VENTRICLES: There were no left ventricular global or regional wall motion  abnormalities.  CORONARY VESSELS: The coronary circulation is right dominant.  LM:   --  LM: Normal.  LAD:   --  LAD: Normal.  --  D1: Normal.  CX:   --  Proximal circumflex: Normal. There was no significant restenosis.  --  Mid circumflex: Normal.  --  Distal circumflex: There was a tubular 100 % stenosis.  --  OM1: There was a 100 % stenosis. It is stent jailed.  --  OM2: Normal. There was no significant restenosis.  RI:   --  Ramus intermedius: Normal. There was no significant restenosis.  RCA:   --  RCA: The vessel was very large sized (dominant).  --  Proximal RCA: Normal.  --  Mid RCA: There was a discrete 40 % stenosis.  --  Distal RCA: Normal.  --  RPDA: Normal.  --  RPL1: Normal.  COMPLICATIONS: No complications occurred during the cath lab visit.  DIAGNOSTIC IMPRESSIONS: Widely patent stents in proximal LCx, OM2 and Ramus  intermedius. 100% distal LCx chronic total occlusion.  DIAGNOSTIC RECOMMENDATIONS: S/p successful PTCA/Stent of 100% distal LCx  chronic total occlusion to 0% residual using DRE.  INTERVENTIONAL IMPRESSIONS: Widely patent stents in proximal LCx, OM2 and  Ramus intermedius. 100% distal LCx chronic total occlusion.  INTERVENTIONAL RECOMMENDATIONS: S/p successful PTCA/Stent of 100% distal  LCx chronic total occlusion to 0% residual using DRE. Patient management  should include aggressive medical therapy and an exercise program. The  patient should follow a low fat diet.  Prepared and signed by  Damián Bhatti M.D.  Signed 01/31/2014 15:41:09    < end of copied text >    ASSESSMENT AND PLAN:  56 y/o M with hx of HTN, HLD, CAD s/p mult PCI, last DRE to  of dLCx in Jan 2014, presents with dizziness x 2-3 days, HUBBARD and B/L LE and UE weakness associated with the dizziness.     --Trop T negative x2  --CT head negative  --F/U Neuro eval  --Check TTE today  --Further ischemic eval pending above    Cynthia Tyler PA-C  [facility]  [address], [address]   [place] [address]  office [phone]  pager [phone]

## 2018-01-11 NOTE — CONSULT NOTE ADULT - SUBJECTIVE AND OBJECTIVE BOX
cc dizziness  [place] 58 y/o M with hx of HTN, CAD s/p proximal LCx, Distal LCx,  OM2, ramus intermedius, and diagonal stents, HLD presents with dizziness x 2-3 days. Patient states dizziness is intermittent, and random onset. He cannot recall what exacerbates or relieves the dizziness. He also noticed that he has SOB with exertion with 1 flight of stairs or walking up to 1 block x 2-3 weeks, which is new for him. He also complains of b/l LE and UE weakness with the dizziness. He saw his PMD for these symptoms and had normal blood work. He planned to see his cardiologist on Monday, but the dizziness was worsening so he came to ED for further eval. Denies fever, chills, cough, falls, LOC, chest pain, melena, hematochezia, abdominal pain, diarrhea, constipation, LE edema, calf tenderness or dysuria.     Allergies NKDA    REVIEW OF SYSTEMS:    CONSTITUTIONAL: No weakness, fevers or chills  EYES/ENT: No visual changes;  No vertigo or throat pain   NECK: No pain or stiffness  RESPIRATORY: No cough, wheezing, hemoptysis; No shortness of breath  CARDIOVASCULAR: No chest pain or palpitations  GASTROINTESTINAL: No abdominal or epigastric pain. No nausea, vomiting, or hematemesis; No diarrhea or constipation. No melena or hematochezia.  GENITOURINARY: No dysuria, frequency or hematuria  NEUROLOGICAL: No numbness or weakness  SKIN: No itching, burning, rashes, or lesions   All other review of systems is negative unless indicated above.    Home Medications:   * Patient Currently Takes Medications as of 10-Erick-2018 19:49 documented in Structured Notes  · 	aspirin 81 mg oral delayed release tablet: 1 tab(s) orally once a day, Last Dose Taken:    · 	clopidogrel 75 mg oral tablet: 1 tab(s) orally once a day, Last Dose Taken:    · 	losartan 100 mg oral tablet: 1 tab(s) orally once a day, Last Dose Taken:    · 	Coreg 12.5 mg oral tablet: 1 tab(s) orally 2 times a day, Last Dose Taken:    · 	amLODIPine 5 mg oral tablet: 1 tab(s) orally once a day, Last Dose Taken:    · 	famotidine 20 mg oral tablet: 1 tab(s) orally once a day, Last Dose Taken:    · 	simvastatin 40 mg oral tablet: 1 tab(s) orally once a day (at bedtime), Last Dose Taken:    · 	isosorbide mononitrate 30 mg oral tablet, extended release: 1 tab(s) orally once a day (in the morning), Last Dose Taken:    · 	Lasix 20 mg oral tablet: 1 tab(s) orally once a day    Patient History: Past Medical History:  CAD (Coronary Artery Disease)    Coronary Stent  to LAD and D2 in 1/2011  proximal LCx, Distal LCx,  OM2, ramus intermedius, and diagonal stents  HTN (Hypertension)    Hyperlipidemia.    Past Surgical History:  Stented coronary artery  x4.    Family History:  Family history of prostate cancer in father     Mother  Still living? Unknown  Family history of heart disease, Age at diagnosis: Age Unknown.    Social History:  Social History (marital status, living situation, occupation, tobacco use, alcohol and drug use, and sexual history): Previous smoker. Quit Smoking 16 years ago. Smoked for approximately for 20 years 1-2 PPD every 2-3 days.   Denies alcohol or drug use.    Works as a delivery malik.	      PHYSICAL EXAM  General: WN/WD NAD  PERRLA  Neurology: A&Ox3, nonfocal, CHASE x 4  Respiratory: CTA B/L  CV: RRR, S1S2, no murmurs, rubs or gallops  Abdominal: Soft, NT, ND +BS, Last BM  Extremities: No edema, + peripheral pulses  Skin Normal     Lab Results:  CBC  CBC Full  -  ( 11 Jan 2018 05:50 )  WBC Count : 7.01 K/uL  Hemoglobin : 15.0 g/dL  Hematocrit : 44.1 %  Platelet Count - Automated : 193 K/uL  Mean Cell Volume : 86.8 fL  Mean Cell Hemoglobin : 29.5 pg  Mean Cell Hemoglobin Concentration : 34.0 %  Auto Neutrophil # : 4.62 K/uL  Auto Lymphocyte # : 1.58 K/uL  Auto Monocyte # : 0.54 K/uL  Auto Eosinophil # : 0.20 K/uL  Auto Basophil # : 0.04 K/uL  Auto Neutrophil % : 65.9 %  Auto Lymphocyte % : 22.5 %  Auto Monocyte % : 7.7 %  Auto Eosinophil % : 2.9 %  Auto Basophil % : 0.6 %    .		Differential:	[] Automated		[] Manual  Chemistry                        15.0   7.01  )-----------( 193      ( 11 Jan 2018 05:50 )             44.1     01-11    142  |  102  |  19  ----------------------------<  103<H>  4.3   |  26  |  1.26    Ca    9.3      11 Jan 2018 05:50  Phos  3.6     01-11  Mg     2.1     01-11    TPro  7.1  /  Alb  4.4  /  TBili  0.8  /  DBili  x   /  AST  22  /  ALT  19  /  AlkPhos  63  01-10    LIVER FUNCTIONS - ( 10 Erick 2018 11:20 )  Alb: 4.4 g/dL / Pro: 7.1 g/dL / ALK PHOS: 63 u/L / ALT: 19 u/L / AST: 22 u/L / GGT: x           PT/INR - ( 10 Erick 2018 11:20 )   PT: 12.2 SEC;   INR: 1.10          PTT - ( 10 Erick 2018 11:20 )  PTT:37.6 SEC          MICROBIOLOGY/CULTURES:      RADIOLOGY RESULTS: REVIEWED

## 2018-01-12 LAB
BUN SERPL-MCNC: 19 MG/DL — SIGNIFICANT CHANGE UP (ref 7–23)
CALCIUM SERPL-MCNC: 8.9 MG/DL — SIGNIFICANT CHANGE UP (ref 8.4–10.5)
CHLORIDE SERPL-SCNC: 100 MMOL/L — SIGNIFICANT CHANGE UP (ref 98–107)
CO2 SERPL-SCNC: 25 MMOL/L — SIGNIFICANT CHANGE UP (ref 22–31)
CREAT SERPL-MCNC: 1.09 MG/DL — SIGNIFICANT CHANGE UP (ref 0.5–1.3)
GLUCOSE SERPL-MCNC: 81 MG/DL — SIGNIFICANT CHANGE UP (ref 70–99)
HCT VFR BLD CALC: 44.1 % — SIGNIFICANT CHANGE UP (ref 39–50)
HGB BLD-MCNC: 14.3 G/DL — SIGNIFICANT CHANGE UP (ref 13–17)
MCHC RBC-ENTMCNC: 28.2 PG — SIGNIFICANT CHANGE UP (ref 27–34)
MCHC RBC-ENTMCNC: 32.4 % — SIGNIFICANT CHANGE UP (ref 32–36)
MCV RBC AUTO: 87 FL — SIGNIFICANT CHANGE UP (ref 80–100)
NRBC # FLD: 0 — SIGNIFICANT CHANGE UP
PLATELET # BLD AUTO: 187 K/UL — SIGNIFICANT CHANGE UP (ref 150–400)
PMV BLD: 12.5 FL — SIGNIFICANT CHANGE UP (ref 7–13)
POTASSIUM SERPL-MCNC: 4.2 MMOL/L — SIGNIFICANT CHANGE UP (ref 3.5–5.3)
POTASSIUM SERPL-SCNC: 4.2 MMOL/L — SIGNIFICANT CHANGE UP (ref 3.5–5.3)
RBC # BLD: 5.07 M/UL — SIGNIFICANT CHANGE UP (ref 4.2–5.8)
RBC # FLD: 12.9 % — SIGNIFICANT CHANGE UP (ref 10.3–14.5)
SODIUM SERPL-SCNC: 139 MMOL/L — SIGNIFICANT CHANGE UP (ref 135–145)
WBC # BLD: 5.62 K/UL — SIGNIFICANT CHANGE UP (ref 3.8–10.5)
WBC # FLD AUTO: 5.62 K/UL — SIGNIFICANT CHANGE UP (ref 3.8–10.5)

## 2018-01-12 PROCEDURE — 93458 L HRT ARTERY/VENTRICLE ANGIO: CPT | Mod: 26

## 2018-01-12 RX ORDER — TRAMADOL HYDROCHLORIDE 50 MG/1
50 TABLET ORAL ONCE
Qty: 0 | Refills: 0 | Status: DISCONTINUED | OUTPATIENT
Start: 2018-01-12 | End: 2018-01-12

## 2018-01-12 RX ADMIN — Medication 650 MILLIGRAM(S): at 19:48

## 2018-01-12 RX ADMIN — SODIUM CHLORIDE 3 MILLILITER(S): 9 INJECTION INTRAMUSCULAR; INTRAVENOUS; SUBCUTANEOUS at 22:23

## 2018-01-12 RX ADMIN — AMLODIPINE BESYLATE 5 MILLIGRAM(S): 2.5 TABLET ORAL at 15:59

## 2018-01-12 RX ADMIN — SODIUM CHLORIDE 3 MILLILITER(S): 9 INJECTION INTRAMUSCULAR; INTRAVENOUS; SUBCUTANEOUS at 05:44

## 2018-01-12 RX ADMIN — CARVEDILOL PHOSPHATE 12.5 MILLIGRAM(S): 80 CAPSULE, EXTENDED RELEASE ORAL at 17:48

## 2018-01-12 RX ADMIN — Medication 650 MILLIGRAM(S): at 18:48

## 2018-01-12 RX ADMIN — Medication 20 MILLIGRAM(S): at 05:44

## 2018-01-12 RX ADMIN — TRAMADOL HYDROCHLORIDE 50 MILLIGRAM(S): 50 TABLET ORAL at 00:57

## 2018-01-12 RX ADMIN — FAMOTIDINE 20 MILLIGRAM(S): 10 INJECTION INTRAVENOUS at 15:59

## 2018-01-12 RX ADMIN — SODIUM CHLORIDE 3 MILLILITER(S): 9 INJECTION INTRAMUSCULAR; INTRAVENOUS; SUBCUTANEOUS at 15:59

## 2018-01-12 RX ADMIN — SIMVASTATIN 40 MILLIGRAM(S): 20 TABLET, FILM COATED ORAL at 22:23

## 2018-01-12 RX ADMIN — TRAMADOL HYDROCHLORIDE 50 MILLIGRAM(S): 50 TABLET ORAL at 01:57

## 2018-01-12 RX ADMIN — ISOSORBIDE MONONITRATE 30 MILLIGRAM(S): 60 TABLET, EXTENDED RELEASE ORAL at 15:59

## 2018-01-12 RX ADMIN — CLOPIDOGREL BISULFATE 75 MILLIGRAM(S): 75 TABLET, FILM COATED ORAL at 11:19

## 2018-01-12 RX ADMIN — LOSARTAN POTASSIUM 100 MILLIGRAM(S): 100 TABLET, FILM COATED ORAL at 17:48

## 2018-01-12 RX ADMIN — Medication 81 MILLIGRAM(S): at 11:19

## 2018-01-12 RX ADMIN — CARVEDILOL PHOSPHATE 12.5 MILLIGRAM(S): 80 CAPSULE, EXTENDED RELEASE ORAL at 05:44

## 2018-01-12 NOTE — PROGRESS NOTE ADULT - ATTENDING COMMENTS
Patient seen and examined.  Agree with above.   -Cath with patent stents  -EPS consult for AICD gilmar Pollard MD  [facility]  [address], [address]  [place] [address]  office: [phone]  pager: [phone]

## 2018-01-12 NOTE — PROGRESS NOTE ADULT - SUBJECTIVE AND OBJECTIVE BOX
[facility]        - Patient seen and examined.  - Today, patient is without complaints. Today with his sister at bedside, he denied any vertigo, he stated that it was more light headedness.          *****MEDICATIONS: Current medication reviewed and documented.    MEDICATIONS  (STANDING):  amLODIPine   Tablet 5 milliGRAM(s) Oral <User Schedule>  aspirin enteric coated 81 milliGRAM(s) Oral daily  carvedilol 12.5 milliGRAM(s) Oral every 12 hours  clopidogrel Tablet 75 milliGRAM(s) Oral daily  famotidine    Tablet 20 milliGRAM(s) Oral daily  furosemide    Tablet 20 milliGRAM(s) Oral daily  heparin  Injectable 5000 Unit(s) SubCutaneous every 8 hours  isosorbide   mononitrate ER Tablet (IMDUR) 30 milliGRAM(s) Oral <User Schedule>  losartan 100 milliGRAM(s) Oral <User Schedule>  simvastatin 40 milliGRAM(s) Oral at bedtime  sodium chloride 0.9% lock flush 3 milliLiter(s) IV Push every 8 hours    MEDICATIONS  (PRN):  acetaminophen   Tablet. 650 milliGRAM(s) Oral every 6 hours PRN headache  acetaminophen   Tablet. 650 milliGRAM(s) Oral once PRN Mild/moderate/severe pain           ***** REVIEW OF SYSTEM:  GEN: no fever, no chills, no pain  RESP: no SOB, no cough, no sputum  CVS: no chest pain, no palpitations, no edema  GI: no abdominal pain, no nausea, no vomiting, no constipation, no diarrhea  : no dysurea, no frequency  NEURO: no headache, no diziness  PSYCH: no depression, not anxious  Derm : no itching, no rash         ***** VITAL SIGNS:  T(F): 98 (18 @ 05:43), Max: 98 (18 @ 05:43)  HR: 62 (18 @ 11:28) (61 - 63)  BP: 139/90 (18 @ 11:28) (105/66 - 139/90)  RR: 18 (18 @ 11:28) (16 - 18)  SpO2: 100% (18 @ 11:28) (99% - 100%)  Wt(kg): --  ,   I&O's Summary    2018 07:01  -  2018 07:00  --------------------------------------------------------  IN: 700 mL / OUT: 800 mL / NET: -100 mL             *****PHYSICAL EXAM:  Alert oriented x 3 attention comprehension are fair.  Able to name, repeat.   EOmi fundi no visualized   no nystagmus VFF to confrontation  Tongue is mid  Palate elevates symmetrically   Moving all 4 ext spontaneously no drift appreciated    Gait not assessed.            *****LAB AND IMAGIN.3   5.62  )-----------( 187      ( 2018 05:40 )             44.1               01-    139  |  100  |  19  ----------------------------<  81  4.2   |  25  |  1.09    Ca    8.9      2018 05:40  Phos  3.6     01-11  Mg     2.1     01-11             CARDIAC MARKERS ( 10 Erick 2018 21:04 )  x     / < 0.06 ng/mL / 78 u/L / 1.12 ng/mL / x                    [All pertinent recent Imaging/Reports reviewed]           *****A S S E S S M E N T   A N D   P L A N :    58 y/o M with hx of HTN, CAD s/p proximal LCx, Distal LCx,  OM2, ramus intermedius, and diagonal stents, HLD presents with dizziness x 2-3 days.  Patient states dizziness which he describes as not vertigo, was brought on by exertional activity like going up stairs. He reports that his concern is that he loses power over all 4 ext, and is sob with exertion with 1 flight of stairs or walking up to 1 block x 2-3 weeks, which is new for him.     cardiac work up underway  Sinus girish on EKG.  appears to be exertional in nature. Since it is generalized weakness, does not localize to any hemisphere although brainstem ischemia vs. c spine problem is a possibilty  Personally reviewed ct with special focus on the brainstem no abn seen.  Will continue to monitor closely.  Pt is going for cardiac cath today      Thank you for allowing me to participate in the care of this patient. Please do not hesitate to call me if you have any  questions.        ________________  Valeri Franklin MD  [facility]  [phone]     30 minutes spent on total encounter; more than 50 % of the visit was  spent counseling and or  coordinating care by the attending physician.   This note was partially created using voice recognition software and is inherently subject to errors including those of syntax and sound alike substitutions which may escape proofreading. In such instances, original meaning may be extrapolated by contextual derivation.   At the present time, we do not provide outpatient followup, this will need to be arranged through an alternative practice. Best to call the pt insurance to find  a participating provider.  This was explained to patient at the time of the visit.

## 2018-01-12 NOTE — PROGRESS NOTE ADULT - SUBJECTIVE AND OBJECTIVE BOX
Patient is a 57y old  Male who presents with a chief complaint of Dizziness (10 Erick 2018 19:56)      INTERVAL HPI/OVERNIGHT EVENTS:  T(C): 36.6 (01-12-18 @ 15:57), Max: 36.7 (01-12-18 @ 05:43)  HR: 67 (01-12-18 @ 15:57) (61 - 67)  BP: 132/83 (01-12-18 @ 15:57) (119/73 - 139/90)  RR: 18 (01-12-18 @ 15:57) (16 - 18)  SpO2: 100% (01-12-18 @ 15:57) (99% - 100%)  Wt(kg): --  I&O's Summary    11 Jan 2018 07:01  -  12 Jan 2018 07:00  --------------------------------------------------------  IN: 700 mL / OUT: 800 mL / NET: -100 mL        LABS:                        14.3   5.62  )-----------( 187      ( 12 Jan 2018 05:40 )             44.1     01-12    139  |  100  |  19  ----------------------------<  81  4.2   |  25  |  1.09    Ca    8.9      12 Jan 2018 05:40  Phos  3.6     01-11  Mg     2.1     01-11          CAPILLARY BLOOD GLUCOSE                MEDICATIONS  (STANDING):  amLODIPine   Tablet 5 milliGRAM(s) Oral <User Schedule>  aspirin enteric coated 81 milliGRAM(s) Oral daily  carvedilol 12.5 milliGRAM(s) Oral every 12 hours  clopidogrel Tablet 75 milliGRAM(s) Oral daily  famotidine    Tablet 20 milliGRAM(s) Oral daily  furosemide    Tablet 20 milliGRAM(s) Oral daily  heparin  Injectable 5000 Unit(s) SubCutaneous every 8 hours  isosorbide   mononitrate ER Tablet (IMDUR) 30 milliGRAM(s) Oral <User Schedule>  losartan 100 milliGRAM(s) Oral <User Schedule>  simvastatin 40 milliGRAM(s) Oral at bedtime  sodium chloride 0.9% lock flush 3 milliLiter(s) IV Push every 8 hours    MEDICATIONS  (PRN):  acetaminophen   Tablet. 650 milliGRAM(s) Oral once PRN Mild/moderate/severe pain  acetaminophen   Tablet. 650 milliGRAM(s) Oral every 6 hours PRN headache          PHYSICAL EXAM:  GENERAL: NAD, well-groomed, well-developed  HEAD:  Atraumatic, Normocephalic  CHEST/LUNG: Clear to percussion bilaterally; No rales, rhonchi, wheezing, or rubs  HEART: Regular rate and rhythm; No murmurs, rubs, or gallops  ABDOMEN: Soft, Nontender, Nondistended; Bowel sounds present  EXTREMITIES:  2+ Peripheral Pulses, No clubbing, cyanosis, or edema  LYMPH: No lymphadenopathy noted  SKIN: No rashes or lesions    Care Discussed with Consultants/Other Providers [ +] YES  [ ] NO

## 2018-01-12 NOTE — PROGRESS NOTE ADULT - SUBJECTIVE AND OBJECTIVE BOX
SUBJECTIVE: no cp or SOB    MEDICATIONS  (STANDING):  amLODIPine   Tablet 5 milliGRAM(s) Oral <User Schedule>  aspirin enteric coated 81 milliGRAM(s) Oral daily  carvedilol 12.5 milliGRAM(s) Oral every 12 hours  clopidogrel Tablet 75 milliGRAM(s) Oral daily  famotidine    Tablet 20 milliGRAM(s) Oral daily  furosemide    Tablet 20 milliGRAM(s) Oral daily  heparin  Injectable 5000 Unit(s) SubCutaneous every 8 hours  isosorbide   mononitrate ER Tablet (IMDUR) 30 milliGRAM(s) Oral <User Schedule>  losartan 100 milliGRAM(s) Oral <User Schedule>  simvastatin 40 milliGRAM(s) Oral at bedtime  sodium chloride 0.9% lock flush 3 milliLiter(s) IV Push every 8 hours    MEDICATIONS  (PRN):  acetaminophen   Tablet. 650 milliGRAM(s) Oral once PRN Mild/moderate/severe pain  acetaminophen   Tablet. 650 milliGRAM(s) Oral every 6 hours PRN headache      LABS:                        14.3   5.62  )-----------( 187      ( 12 Jan 2018 05:40 )             44.1     Hemoglobin: 14.3 g/dL (01-12 @ 05:40)  Hemoglobin: 15.0 g/dL (01-11 @ 05:50)  Hemoglobin: 13.7 g/dL (01-10 @ 11:20)    01-12    139  |  100  |  19  ----------------------------<  81  4.2   |  25  |  1.09    Ca    8.9      12 Jan 2018 05:40  Phos  3.6     01-11  Mg     2.1     01-11      Creatinine Trend: 1.09<--, 1.26<--, 1.22<--     CARDIAC MARKERS ( 10 Erick 2018 21:04 )  x     / < 0.06 ng/mL / 78 u/L / 1.12 ng/mL / x      CARDIAC MARKERS ( 10 Erick 2018 11:20 )  x     / < 0.06 ng/mL / 76 u/L / 1.16 ng/mL / x          PHYSICAL EXAM  Vital Signs Last 24 Hrs  T(C): 36.6 (12 Jan 2018 15:57), Max: 36.7 (12 Jan 2018 05:43)  T(F): 97.8 (12 Jan 2018 15:57), Max: 98 (12 Jan 2018 05:43)  HR: 67 (12 Jan 2018 15:57) (61 - 67)  BP: 132/83 (12 Jan 2018 15:57) (105/66 - 139/90)  BP(mean): --  RR: 18 (12 Jan 2018 15:57) (16 - 18)  SpO2: 100% (12 Jan 2018 15:57) (99% - 100%)    Cardiovascular:  S1S2 RRR, No JVD, 1/6 VALERIA   Respiratory: Lungs clear to auscultation, normal effort  Gastrointestinal: Abdomen soft, ND, NT, +BS  Skin: Warm, dry, intact. No rash.  Ext: No C/C/E BL LE    DIAGNOSTIC DATA  TELEMETRY: NSR    < from: CT Head No Cont (01.10.18 @ 17:17) >    IMPRESSION:    Normal non-contrast CT of the brain.    < end of copied text >    < from: Cardiac Cath Lab (01.31.14 @ 13:55) >  VENTRICLES: There were no left ventricular global or regional wall motion  abnormalities.  CORONARY VESSELS: The coronary circulation is right dominant.  LM:   --  LM: Normal.  LAD:   --  LAD: Normal.  --  D1: Normal.  CX:   --  Proximal circumflex: Normal. There was no significant restenosis.  --  Mid circumflex: Normal.  --  Distal circumflex: There was a tubular 100 % stenosis.  --  OM1: There was a 100 % stenosis. It is stent jailed.  --  OM2: Normal. There was no significant restenosis.  RI:   --  Ramus intermedius: Normal. There was no significant restenosis.  RCA:   --  RCA: The vessel was very large sized (dominant).  --  Proximal RCA: Normal.  --  Mid RCA: There was a discrete 40 % stenosis.  --  Distal RCA: Normal.  --  RPDA: Normal.  --  RPL1: Normal.  COMPLICATIONS: No complications occurred during the cath lab visit.  DIAGNOSTIC IMPRESSIONS: Widely patent stents in proximal LCx, OM2 and Ramus  intermedius. 100% distal LCx chronic total occlusion.  DIAGNOSTIC RECOMMENDATIONS: S/p successful PTCA/Stent of 100% distal LCx  chronic total occlusion to 0% residual using DRE.  INTERVENTIONAL IMPRESSIONS: Widely patent stents in proximal LCx, OM2 and  Ramus intermedius. 100% distal LCx chronic total occlusion.  INTERVENTIONAL RECOMMENDATIONS: S/p successful PTCA/Stent of 100% distal  LCx chronic total occlusion to 0% residual using DRE. Patient management  should include aggressive medical therapy and an exercise program. The  patient should follow a low fat diet.  Prepared and signed by  Damián Bhatti M.D.  Signed 01/31/2014 15:41:09    < end of copied text >    ECHO     EF 35%     < from: TTE with Doppler (w/Cont) (01.11.18 @ 14:33) >  CONCLUSIONS:  1. Mitral annular calcification, otherwise normal mitral  valve. Minimal mitral regurgitation.  2. Moderate left ventricular enlargement.  3. Endocardium not well visualized; grossly moderate to  severe global left ventricular systolic dysfunction.  Endocardial visualization enhanced with intravenous  injection of echo contrast (Definity).  No LV thrombus  seen.  4. Normal right ventricular size and function.    < end of copied text >    CATH     < from: Cardiac Cath Lab - Adult (01.12.18 @ 13:38) >  VENTRICLES: No left ventriculogram was performed.  CORONARY VESSELS: The coronary circulation is right dominant.  LM:   --  LM: Normal.  LAD:   --  LAD: Angiography showed mild atherosclerosis with no flow  limiting lesions.  --  D1: There was a tubular 20 % stenosis at the site of a prior stent. The  lesion was eccentric. There was TIMIgrade 3 flow through the vessel  (brisk flow).  CX:   --  Circumflex: Angiography showed mild atherosclerosis with no flow  limiting lesions. There was a tubular 10 % stenosis at the site of a prior  stent. The lesion was eccentric. There was DANIEL grade 3 flow through the  vessel (brisk flow).  --  Mid circumflex: There was a diffuse 20 % stenosis at the site of a  prior stent. The lesion was eccentric. There was DANIEL grade 3 flow through  the vessel (brisk flow).  --  OM1: There was a tubular 20% stenosis at the site of a prior stent.  The lesion was smoothly contoured. There was DANIEL grade 3 flow through the  vessel (brisk flow).  RCA:   --  RCA: Angiography showed mild atherosclerosis with no flow  limiting lesions.  COMPLICATIONS: There were no complications.  DIAGNOSTIC IMPRESSIONS: Patent stents Diagonal and LCx  Non-obstructive CAD    < end of copied text >        ASSESSMENT AND PLAN:  58 y/o M with hx of HTN, HLD, CAD s/p mult PCI, last DRE to  of dLCx in Jan 2014, presents with dizziness x 2-3 days, HUBBARD and B/L LE and UE weakness associated with the dizziness.     --Trop T negative x2  --CT head negative  --F/U Neuro recommendations   -- TTE w/ LV dysfunction EF 35%   --CATH as noted above c/w medical management

## 2018-01-12 NOTE — CHART NOTE - NSCHARTNOTEFT_GEN_A_CORE
Patient is s/p cardiac cath. Patient without any complaints. site is stable. No hematoma. Dressing is clean/intact/dry. 2+ radial pulse and good rap refill.  will monitor closely.

## 2018-01-13 LAB
BUN SERPL-MCNC: 28 MG/DL — HIGH (ref 7–23)
CALCIUM SERPL-MCNC: 9 MG/DL — SIGNIFICANT CHANGE UP (ref 8.4–10.5)
CHLORIDE SERPL-SCNC: 99 MMOL/L — SIGNIFICANT CHANGE UP (ref 98–107)
CO2 SERPL-SCNC: 23 MMOL/L — SIGNIFICANT CHANGE UP (ref 22–31)
CREAT SERPL-MCNC: 1.55 MG/DL — HIGH (ref 0.5–1.3)
GLUCOSE SERPL-MCNC: 99 MG/DL — SIGNIFICANT CHANGE UP (ref 70–99)
HCT VFR BLD CALC: 46.2 % — SIGNIFICANT CHANGE UP (ref 39–50)
HGB BLD-MCNC: 14.9 G/DL — SIGNIFICANT CHANGE UP (ref 13–17)
MCHC RBC-ENTMCNC: 27.7 PG — SIGNIFICANT CHANGE UP (ref 27–34)
MCHC RBC-ENTMCNC: 32.3 % — SIGNIFICANT CHANGE UP (ref 32–36)
MCV RBC AUTO: 86 FL — SIGNIFICANT CHANGE UP (ref 80–100)
NRBC # FLD: 0 — SIGNIFICANT CHANGE UP
PLATELET # BLD AUTO: 207 K/UL — SIGNIFICANT CHANGE UP (ref 150–400)
PMV BLD: 12.4 FL — SIGNIFICANT CHANGE UP (ref 7–13)
POTASSIUM SERPL-MCNC: 4.6 MMOL/L — SIGNIFICANT CHANGE UP (ref 3.5–5.3)
POTASSIUM SERPL-SCNC: 4.6 MMOL/L — SIGNIFICANT CHANGE UP (ref 3.5–5.3)
RBC # BLD: 5.37 M/UL — SIGNIFICANT CHANGE UP (ref 4.2–5.8)
RBC # FLD: 12.7 % — SIGNIFICANT CHANGE UP (ref 10.3–14.5)
SODIUM SERPL-SCNC: 136 MMOL/L — SIGNIFICANT CHANGE UP (ref 135–145)
WBC # BLD: 7.1 K/UL — SIGNIFICANT CHANGE UP (ref 3.8–10.5)
WBC # FLD AUTO: 7.1 K/UL — SIGNIFICANT CHANGE UP (ref 3.8–10.5)

## 2018-01-13 RX ORDER — TRAMADOL HYDROCHLORIDE 50 MG/1
50 TABLET ORAL ONCE
Qty: 0 | Refills: 0 | Status: DISCONTINUED | OUTPATIENT
Start: 2018-01-13 | End: 2018-01-13

## 2018-01-13 RX ADMIN — SODIUM CHLORIDE 3 MILLILITER(S): 9 INJECTION INTRAMUSCULAR; INTRAVENOUS; SUBCUTANEOUS at 12:43

## 2018-01-13 RX ADMIN — Medication 81 MILLIGRAM(S): at 12:42

## 2018-01-13 RX ADMIN — TRAMADOL HYDROCHLORIDE 50 MILLIGRAM(S): 50 TABLET ORAL at 21:45

## 2018-01-13 RX ADMIN — CARVEDILOL PHOSPHATE 12.5 MILLIGRAM(S): 80 CAPSULE, EXTENDED RELEASE ORAL at 17:36

## 2018-01-13 RX ADMIN — CARVEDILOL PHOSPHATE 12.5 MILLIGRAM(S): 80 CAPSULE, EXTENDED RELEASE ORAL at 05:35

## 2018-01-13 RX ADMIN — FAMOTIDINE 20 MILLIGRAM(S): 10 INJECTION INTRAVENOUS at 12:42

## 2018-01-13 RX ADMIN — ISOSORBIDE MONONITRATE 30 MILLIGRAM(S): 60 TABLET, EXTENDED RELEASE ORAL at 12:43

## 2018-01-13 RX ADMIN — CLOPIDOGREL BISULFATE 75 MILLIGRAM(S): 75 TABLET, FILM COATED ORAL at 12:42

## 2018-01-13 RX ADMIN — Medication 650 MILLIGRAM(S): at 11:50

## 2018-01-13 RX ADMIN — SODIUM CHLORIDE 3 MILLILITER(S): 9 INJECTION INTRAMUSCULAR; INTRAVENOUS; SUBCUTANEOUS at 21:00

## 2018-01-13 RX ADMIN — SODIUM CHLORIDE 3 MILLILITER(S): 9 INJECTION INTRAMUSCULAR; INTRAVENOUS; SUBCUTANEOUS at 05:32

## 2018-01-13 RX ADMIN — HEPARIN SODIUM 5000 UNIT(S): 5000 INJECTION INTRAVENOUS; SUBCUTANEOUS at 21:01

## 2018-01-13 RX ADMIN — SIMVASTATIN 40 MILLIGRAM(S): 20 TABLET, FILM COATED ORAL at 21:01

## 2018-01-13 RX ADMIN — Medication 20 MILLIGRAM(S): at 05:34

## 2018-01-13 RX ADMIN — TRAMADOL HYDROCHLORIDE 50 MILLIGRAM(S): 50 TABLET ORAL at 21:01

## 2018-01-13 RX ADMIN — AMLODIPINE BESYLATE 5 MILLIGRAM(S): 2.5 TABLET ORAL at 12:42

## 2018-01-13 RX ADMIN — Medication 650 MILLIGRAM(S): at 11:12

## 2018-01-13 RX ADMIN — LOSARTAN POTASSIUM 100 MILLIGRAM(S): 100 TABLET, FILM COATED ORAL at 17:36

## 2018-01-13 NOTE — CONSULT NOTE ADULT - SUBJECTIVE AND OBJECTIVE BOX
EP ATTENDING      HISTORY OF PRESENT ILLNESS: He is a pleasant 56 y/o male with a long standing history of CAD and LBBB who is now admitted with presyncope. A repeat cath showed patent stents and an echo showed severe LV dysfunction (35%). He has been on optimal medical therapy with coreg and losartan for greater than 9 months. In addition he has NYHA Class III symptoms of CHF with dyspnea on exertion and orthopnea. His signs and symptoms of presyncope are concerning for a ventricular arrhythmia or even transient AV block given existing LBBB. He denies syncope.    PAST MEDICAL & SURGICAL HISTORY:  Hyperlipidemia  Coronary Stent: to LAD and D2 in 1/2011  proximal LCx, Distal LCx,  OM2, ramus intermedius, and diagonal stents  CAD (Coronary Artery Disease)  HTN (Hypertension)  Stented coronary artery: x4      MEDICATIONS  (STANDING):  amLODIPine   Tablet 5 milliGRAM(s) Oral <User Schedule>  aspirin enteric coated 81 milliGRAM(s) Oral daily  carvedilol 12.5 milliGRAM(s) Oral every 12 hours  clopidogrel Tablet 75 milliGRAM(s) Oral daily  famotidine    Tablet 20 milliGRAM(s) Oral daily  furosemide    Tablet 20 milliGRAM(s) Oral daily  heparin  Injectable 5000 Unit(s) SubCutaneous every 8 hours  isosorbide   mononitrate ER Tablet (IMDUR) 30 milliGRAM(s) Oral <User Schedule>  losartan 100 milliGRAM(s) Oral <User Schedule>  simvastatin 40 milliGRAM(s) Oral at bedtime  sodium chloride 0.9% lock flush 3 milliLiter(s) IV Push every 8 hours    no Known Allergies    FAMILY HISTORY:  Family history of prostate cancer in father  Family history of heart disease (Mother)  Non-contributary for premature coronary disease or sudden cardiac death    SOCIAL HISTORY:    [x ] Non-smoker  [ ] Smoker  [ ] Alcohol      REVIEW OF SYSTEMS:  [ ]chest pain  [ x ]shortness of breath  [  ]palpitations  [  ]syncope  [x ]near syncope [ ]upper extremity weakness   [ ] lower extremity weakness  [  ]diplopia  [  ]altered mental status   [  ]fevers  [ ]chills [ ]nausea  [ ]vomitting  [  ]dysphagia    [ ]abdominal pain  [ ]melena  [ ]BRBPR    [  ]epistaxis  [  ]rash    [ ]lower extremity edema        [x ] All others negative	  [ ] Unable to obtain    PHYSICAL EXAM:  T(C): 36.7 (01-13-18 @ 12:41), Max: 36.8 (01-12-18 @ 22:24)  HR: 65 (01-13-18 @ 17:36) (62 - 70)  BP: 121/66 (01-13-18 @ 17:36) (109/50 - 121/66)  RR: 18 (01-13-18 @ 12:41) (18 - 18)  SpO2: 100% (01-13-18 @ 12:41) (98% - 100%)  Wt(kg): --    no JVD  RRR, no murmurs  CTAB  soft nt/nd  no c/c/e    TELEMETRY: 	  SR, LBBB  ECG:  	SR, LBBB    Echo: LVEF 35%  NST: n/a  Cath: patent stents  	  	  LABS:	 	                          14.9   7.10  )-----------( 207      ( 13 Jan 2018 06:40 )             46.2     01-13    136  |  99  |  28<H>  ----------------------------<  99  4.6   |  23  |  1.55<H>    Ca    9.0      13 Jan 2018 06:40      proBNP:   Lipid Profile:   HgA1c:   TSH:     A/P) He is a pleasant 56 y/o male with a long standing history of CAD and LBBB who is now admitted with presyncope. A repeat cath showed patent stents and an echo showed severe LV dysfunction (35%). He has been on optimal medical therapy with coreg and losartan for greater than 9 months. In addition he has NYHA Class III symptoms of CHF with dyspnea on exertion and orthopnea. His signs and symptoms of presyncope are concerning for a ventricular arrhythmia or even transient AV block given existing LBBB. He denies syncope.    -given the above I recommended a BiV-ICD. I cited a 3% risk of bleeding or infection, and a 1% risk of major complication including but not limited to heart attack, stroke, death or need for emergency open heart surgery or pericardiocentesis.   -understanding his R/B/A he elects      Rayne Henao M.D., RS  Cardiac Electrophysiology  [phone] EP ATTENDING      HISTORY OF PRESENT ILLNESS: He is a pleasant 58 y/o male with a long standing history of CAD and LBBB who is now admitted with presyncope. A repeat cath showed patent stents and an echo showed severe LV dysfunction (35%). He has been on optimal medical therapy with coreg and losartan for greater than 9 months. In addition he has NYHA Class III symptoms of CHF with dyspnea on exertion and orthopnea. His signs and symptoms of presyncope are concerning for a ventricular arrhythmia or even transient AV block given existing LBBB. He denies syncope.    PAST MEDICAL & SURGICAL HISTORY:  Hyperlipidemia  Coronary Stent: to LAD and D2 in 1/2011  proximal LCx, Distal LCx,  OM2, ramus intermedius, and diagonal stents  CAD (Coronary Artery Disease)  HTN (Hypertension)  Stented coronary artery: x4      MEDICATIONS  (STANDING):  amLODIPine   Tablet 5 milliGRAM(s) Oral <User Schedule>  aspirin enteric coated 81 milliGRAM(s) Oral daily  carvedilol 12.5 milliGRAM(s) Oral every 12 hours  clopidogrel Tablet 75 milliGRAM(s) Oral daily  famotidine    Tablet 20 milliGRAM(s) Oral daily  furosemide    Tablet 20 milliGRAM(s) Oral daily  heparin  Injectable 5000 Unit(s) SubCutaneous every 8 hours  isosorbide   mononitrate ER Tablet (IMDUR) 30 milliGRAM(s) Oral <User Schedule>  losartan 100 milliGRAM(s) Oral <User Schedule>  simvastatin 40 milliGRAM(s) Oral at bedtime  sodium chloride 0.9% lock flush 3 milliLiter(s) IV Push every 8 hours    no Known Allergies    FAMILY HISTORY:  Family history of prostate cancer in father  Family history of heart disease (Mother)  Non-contributary for premature coronary disease or sudden cardiac death    SOCIAL HISTORY:    [x ] Non-smoker  [ ] Smoker  [ ] Alcohol      REVIEW OF SYSTEMS:  [ ]chest pain  [ x ]shortness of breath  [  ]palpitations  [  ]syncope  [x ]near syncope [ ]upper extremity weakness   [ ] lower extremity weakness  [  ]diplopia  [  ]altered mental status   [  ]fevers  [ ]chills [ ]nausea  [ ]vomitting  [  ]dysphagia    [ ]abdominal pain  [ ]melena  [ ]BRBPR    [  ]epistaxis  [  ]rash    [ ]lower extremity edema        [x ] All others negative	  [ ] Unable to obtain    PHYSICAL EXAM:  T(C): 36.7 (01-13-18 @ 12:41), Max: 36.8 (01-12-18 @ 22:24)  HR: 65 (01-13-18 @ 17:36) (62 - 70)  BP: 121/66 (01-13-18 @ 17:36) (109/50 - 121/66)  RR: 18 (01-13-18 @ 12:41) (18 - 18)  SpO2: 100% (01-13-18 @ 12:41) (98% - 100%)  Wt(kg): --    no JVD  RRR, no murmurs  CTAB  soft nt/nd  no c/c/e    TELEMETRY: 	  SR, LBBB  ECG:  	SR, LBBB    Echo: LVEF 35%  NST: n/a  Cath: patent stents  	  	  LABS:	 	                          14.9   7.10  )-----------( 207      ( 13 Jan 2018 06:40 )             46.2     01-13    136  |  99  |  28<H>  ----------------------------<  99  4.6   |  23  |  1.55<H>    Ca    9.0      13 Jan 2018 06:40      proBNP:   Lipid Profile:   HgA1c:   TSH:     A/P) He is a pleasant 58 y/o male with a long standing history of CAD and LBBB who is now admitted with presyncope. A repeat cath showed patent stents and an echo showed severe LV dysfunction (35%). He has been on optimal medical therapy with coreg and losartan for greater than 9 months. In addition he has NYHA Class III symptoms of CHF with dyspnea on exertion and orthopnea. His signs and symptoms of presyncope are concerning for a ventricular arrhythmia or even transient AV block given existing LBBB. He denies syncope.    -given the above I recommended a BiV-ICD. I cited a 3% risk of bleeding or infection, and a 1% risk of major complication including but not limited to heart attack, stroke, death or need for emergency open heart surgery or pericardiocentesis.   -understanding his R/B/A he elects BiV-ICD implantation  -NPO after midnight Tuesday night  -BiV-ICD ([employer] for LV only pacing) wednesday AM      Rayne Henao M.D., [facility]  Cardiac Electrophysiology  [phone]

## 2018-01-13 NOTE — PROGRESS NOTE ADULT - SUBJECTIVE AND OBJECTIVE BOX
[facility]        - Patient seen and examined.  - Today, patient is without complaints.         *****MEDICATIONS: Current medication reviewed and documented.    MEDICATIONS  (STANDING):  amLODIPine   Tablet 5 milliGRAM(s) Oral <User Schedule>  aspirin enteric coated 81 milliGRAM(s) Oral daily  carvedilol 12.5 milliGRAM(s) Oral every 12 hours  clopidogrel Tablet 75 milliGRAM(s) Oral daily  famotidine    Tablet 20 milliGRAM(s) Oral daily  furosemide    Tablet 20 milliGRAM(s) Oral daily  heparin  Injectable 5000 Unit(s) SubCutaneous every 8 hours  isosorbide   mononitrate ER Tablet (IMDUR) 30 milliGRAM(s) Oral <User Schedule>  losartan 100 milliGRAM(s) Oral <User Schedule>  simvastatin 40 milliGRAM(s) Oral at bedtime  sodium chloride 0.9% lock flush 3 milliLiter(s) IV Push every 8 hours    MEDICATIONS  (PRN):  acetaminophen   Tablet. 650 milliGRAM(s) Oral once PRN Mild/moderate/severe pain  acetaminophen   Tablet. 650 milliGRAM(s) Oral every 6 hours PRN headache           ***** REVIEW OF SYSTEM:  GEN: no fever, no chills, no pain  RESP: no SOB, no cough, no sputum  CVS: no chest pain, no palpitations, no edema  GI: no abdominal pain, no nausea, no vomiting, no constipation, no diarrhea  : no dysurea, no frequency  NEURO: no headache, no diziness  PSYCH: no depression, not anxious  Derm : no itching, no rash         ***** VITAL SIGNS:  T(F): 98.2 (18 @ 21:00), Max: 98.3 (18 @ 05:31)  HR: 66 (18 @ 21:00) (62 - 70)  BP: 113/56 (18 @ 21:00) (109/50 - 121/66)  RR: 18 (18 @ 21:00) (18 - 18)  SpO2: 99% (18 @ 21:00) (98% - 100%)  Wt(kg): --  ,   I&O's Summary           *****PHYSICAL EXAM:   alert oriented x 3 attention comprehension are fair.  Able to name, repeat.   EOmi fundi no visualized   no nystagmus VFF to confrontation  Tongue is mid  Palate elevates symmetrically   Moving all 4 ext spontaneously no drift appreciated    able to tandem forward and backward.           *****LAB AND IMAGIN.9   7.10  )-----------( 207      ( 2018 06:40 )             46.2                   136  |  99  |  28<H>  ----------------------------<  99  4.6   |  23  |  1.55<H>    Ca    9.0      2018 06:40                           [All pertinent recent Imaging/Reports reviewed]           *****A S S E S S M E N T   A N D   P L A N :        56 y/o M with hx of HTN, CAD s/p proximal LCx, Distal LCx,  OM2, ramus intermedius, and diagonal stents, HLD presents with dizziness x 2-3 days.  Patient states dizziness which he describes as not vertigo, was brought on by exertional activity like going up stairs. He reports that his concern is that he loses power over all 4 ext, and is sob with exertion with 1 flight of stairs or walking up to 1 block x 2-3 weeks, which is new for him.     cardiac work up underway  Sinus girish on EKG.  appears to be exertional in nature. Since it is generalized weakness, does not localize to any hemisphere although brainstem ischemia vs. c spine problem is a possibilty  Personally reviewed ct with special focus on the brainstem no abn seen.  Will continue to monitor closely.  Pt underwent cath no sig stenosis.   however given the persistently low ef awaiting EP consultation.     Thank you for allowing me to participate in the care of this patient. Please do not hesitate to call me if you have any  questions.        ________________  Valeri Franklin MD  [facility]  [phone]     30 minutes spent on total encounter; more than 50 % of the visit was  spent counseling and or  coordinating care by the attending physician.   This note was partially created using voice recognition software and is inherently subject to errors including those of syntax and sound alike substitutions which may escape proofreading. In such instances, original meaning may be extrapolated by contextual derivation.   At the present time, we do not provide outpatient followup, this will need to be arranged through an alternative practice. Best to call the pt insurance to find  a participating provider.  This was explained to patient at the time of the visit.

## 2018-01-13 NOTE — PROGRESS NOTE ADULT - SUBJECTIVE AND OBJECTIVE BOX
SUBJECTIVE: no cp or SOB    MEDICATIONS  (STANDING):  amLODIPine   Tablet 5 milliGRAM(s) Oral <User Schedule>  aspirin enteric coated 81 milliGRAM(s) Oral daily  carvedilol 12.5 milliGRAM(s) Oral every 12 hours  clopidogrel Tablet 75 milliGRAM(s) Oral daily  famotidine    Tablet 20 milliGRAM(s) Oral daily  furosemide    Tablet 20 milliGRAM(s) Oral daily  heparin  Injectable 5000 Unit(s) SubCutaneous every 8 hours  isosorbide   mononitrate ER Tablet (IMDUR) 30 milliGRAM(s) Oral <User Schedule>  losartan 100 milliGRAM(s) Oral <User Schedule>  simvastatin 40 milliGRAM(s) Oral at bedtime  sodium chloride 0.9% lock flush 3 milliLiter(s) IV Push every 8 hours    MEDICATIONS  (PRN):  acetaminophen   Tablet. 650 milliGRAM(s) Oral once PRN Mild/moderate/severe pain  acetaminophen   Tablet. 650 milliGRAM(s) Oral every 6 hours PRN headache      LABS:                        14.3   5.62  )-----------( 187      ( 12 Jan 2018 05:40 )             44.1     Hemoglobin: 14.3 g/dL (01-12 @ 05:40)  Hemoglobin: 15.0 g/dL (01-11 @ 05:50)  Hemoglobin: 13.7 g/dL (01-10 @ 11:20)    01-12    139  |  100  |  19  ----------------------------<  81  4.2   |  25  |  1.09    Ca    8.9      12 Jan 2018 05:40  Phos  3.6     01-11  Mg     2.1     01-11      Creatinine Trend: 1.09<--, 1.26<--, 1.22<--     CARDIAC MARKERS ( 10 Erick 2018 21:04 )  x     / < 0.06 ng/mL / 78 u/L / 1.12 ng/mL / x      CARDIAC MARKERS ( 10 Erick 2018 11:20 )  x     / < 0.06 ng/mL / 76 u/L / 1.16 ng/mL / x          PHYSICAL EXAM  Vital Signs Last 24 Hrs  T(C): 36.6 (12 Jan 2018 15:57), Max: 36.7 (12 Jan 2018 05:43)  T(F): 97.8 (12 Jan 2018 15:57), Max: 98 (12 Jan 2018 05:43)  HR: 67 (12 Jan 2018 15:57) (61 - 67)  BP: 132/83 (12 Jan 2018 15:57) (105/66 - 139/90)  BP(mean): --  RR: 18 (12 Jan 2018 15:57) (16 - 18)  SpO2: 100% (12 Jan 2018 15:57) (99% - 100%)    Cardiovascular:  S1S2 RRR, No JVD, 1/6 VALERIA   Respiratory: Lungs clear to auscultation, normal effort  Gastrointestinal: Abdomen soft, ND, NT, +BS  Skin: Warm, dry, intact. No rash.  Ext: No C/C/E BL LE    DIAGNOSTIC DATA  TELEMETRY: NSR    < from: CT Head No Cont (01.10.18 @ 17:17) >    IMPRESSION:    Normal non-contrast CT of the brain.    < end of copied text >    < from: Cardiac Cath Lab (01.31.14 @ 13:55) >  VENTRICLES: There were no left ventricular global or regional wall motion  abnormalities.  CORONARY VESSELS: The coronary circulation is right dominant.  LM:   --  LM: Normal.  LAD:   --  LAD: Normal.  --  D1: Normal.  CX:   --  Proximal circumflex: Normal. There was no significant restenosis.  --  Mid circumflex: Normal.  --  Distal circumflex: There was a tubular 100 % stenosis.  --  OM1: There was a 100 % stenosis. It is stent jailed.  --  OM2: Normal. There was no significant restenosis.  RI:   --  Ramus intermedius: Normal. There was no significant restenosis.  RCA:   --  RCA: The vessel was very large sized (dominant).  --  Proximal RCA: Normal.  --  Mid RCA: There was a discrete 40 % stenosis.  --  Distal RCA: Normal.  --  RPDA: Normal.  --  RPL1: Normal.  COMPLICATIONS: No complications occurred during the cath lab visit.  DIAGNOSTIC IMPRESSIONS: Widely patent stents in proximal LCx, OM2 and Ramus  intermedius. 100% distal LCx chronic total occlusion.  DIAGNOSTIC RECOMMENDATIONS: S/p successful PTCA/Stent of 100% distal LCx  chronic total occlusion to 0% residual using DRE.  INTERVENTIONAL IMPRESSIONS: Widely patent stents in proximal LCx, OM2 and  Ramus intermedius. 100% distal LCx chronic total occlusion.  INTERVENTIONAL RECOMMENDATIONS: S/p successful PTCA/Stent of 100% distal  LCx chronic total occlusion to 0% residual using DRE. Patient management  should include aggressive medical therapy and an exercise program. The  patient should follow a low fat diet.  Prepared and signed by  Damián Bhatti M.D.  Signed 01/31/2014 15:41:09    < end of copied text >    ECHO     EF 35%     < from: TTE with Doppler (w/Cont) (01.11.18 @ 14:33) >  CONCLUSIONS:  1. Mitral annular calcification, otherwise normal mitral  valve. Minimal mitral regurgitation.  2. Moderate left ventricular enlargement.  3. Endocardium not well visualized; grossly moderate to  severe global left ventricular systolic dysfunction.  Endocardial visualization enhanced with intravenous  injection of echo contrast (Definity).  No LV thrombus  seen.  4. Normal right ventricular size and function.    < end of copied text >    CATH     < from: Cardiac Cath Lab - Adult (01.12.18 @ 13:38) >  VENTRICLES: No left ventriculogram was performed.  CORONARY VESSELS: The coronary circulation is right dominant.  LM:   --  LM: Normal.  LAD:   --  LAD: Angiography showed mild atherosclerosis with no flow  limiting lesions.  --  D1: There was a tubular 20 % stenosis at the site of a prior stent. The  lesion was eccentric. There was TIMIgrade 3 flow through the vessel  (brisk flow).  CX:   --  Circumflex: Angiography showed mild atherosclerosis with no flow  limiting lesions. There was a tubular 10 % stenosis at the site of a prior  stent. The lesion was eccentric. There was DANIEL grade 3 flow through the  vessel (brisk flow).  --  Mid circumflex: There was a diffuse 20 % stenosis at the site of a  prior stent. The lesion was eccentric. There was DANIEL grade 3 flow through  the vessel (brisk flow).  --  OM1: There was a tubular 20% stenosis at the site of a prior stent.  The lesion was smoothly contoured. There was DANIEL grade 3 flow through the  vessel (brisk flow).  RCA:   --  RCA: Angiography showed mild atherosclerosis with no flow  limiting lesions.  COMPLICATIONS: There were no complications.  DIAGNOSTIC IMPRESSIONS: Patent stents Diagonal and LCx  Non-obstructive CAD    < end of copied text >        ASSESSMENT AND PLAN:  58 y/o M with hx of HTN, HLD, CAD s/p mult PCI, last DRE to  of dLCx in Jan 2014, presents with dizziness x 2-3 days, HUBBARD and B/L LE and UE weakness associated with the dizziness.     --Trop T negative x2  --CT head negative  --F/U Neuro recommendations   -- TTE w/ LV dysfunction EF 35%   --CATH as noted above patent stents       c/w medical management   -- Ep consult for AICD candidacy SUBJECTIVE: no cp or SOB      MEDICATIONS  (STANDING):  amLODIPine   Tablet 5 milliGRAM(s) Oral <User Schedule>  aspirin enteric coated 81 milliGRAM(s) Oral daily  carvedilol 12.5 milliGRAM(s) Oral every 12 hours  clopidogrel Tablet 75 milliGRAM(s) Oral daily  famotidine    Tablet 20 milliGRAM(s) Oral daily  furosemide    Tablet 20 milliGRAM(s) Oral daily  heparin  Injectable 5000 Unit(s) SubCutaneous every 8 hours  isosorbide   mononitrate ER Tablet (IMDUR) 30 milliGRAM(s) Oral <User Schedule>  losartan 100 milliGRAM(s) Oral <User Schedule>  simvastatin 40 milliGRAM(s) Oral at bedtime  sodium chloride 0.9% lock flush 3 milliLiter(s) IV Push every 8 hours    MEDICATIONS  (PRN):  acetaminophen   Tablet. 650 milliGRAM(s) Oral once PRN Mild/moderate/severe pain  acetaminophen   Tablet. 650 milliGRAM(s) Oral every 6 hours PRN headache      LABS:                        14.9   7.10  )-----------( 207      ( 13 Jan 2018 06:40 )             46.2     Hemoglobin: 14.9 g/dL (01-13 @ 06:40)  Hemoglobin: 14.3 g/dL (01-12 @ 05:40)  Hemoglobin: 15.0 g/dL (01-11 @ 05:50)  Hemoglobin: 13.7 g/dL (01-10 @ 11:20)    01-13    136  |  99  |  28<H>  ----------------------------<  99  4.6   |  23  |  1.55<H>    Ca    9.0      13 Jan 2018 06:40      Creatinine Trend: 1.55<--, 1.09<--, 1.26<--, 1.22<--     CARDIAC MARKERS ( 10 Erick 2018 21:04 )  x     / < 0.06 ng/mL / 78 u/L / 1.12 ng/mL / x            PHYSICAL EXAM  Vital Signs Last 24 Hrs  T(C): 36.7 (13 Jan 2018 12:41), Max: 36.8 (12 Jan 2018 22:24)  T(F): 98.1 (13 Jan 2018 12:41), Max: 98.3 (13 Jan 2018 05:31)  HR: 65 (13 Jan 2018 17:36) (62 - 70)  BP: 121/66 (13 Jan 2018 17:36) (109/50 - 121/66)  BP(mean): --  RR: 18 (13 Jan 2018 12:41) (18 - 18)  SpO2: 100% (13 Jan 2018 12:41) (98% - 100%)    Cardiovascular:  S1S2 RRR, No JVD, 1/6 VALERIA   Respiratory: Lungs clear to auscultation, normal effort  Gastrointestinal: Abdomen soft, ND, NT, +BS  Ext: No C/C/E BL LE  R radial site soft non tender no sign of active bleed or hematoma     DIAGNOSTIC DATA  TELEMETRY: NSR    < from: CT Head No Cont (01.10.18 @ 17:17) >    IMPRESSION:    Normal non-contrast CT of the brain.    < end of copied text >    < from: Cardiac Cath Lab (01.31.14 @ 13:55) >  VENTRICLES: There were no left ventricular global or regional wall motion  abnormalities.  CORONARY VESSELS: The coronary circulation is right dominant.  LM:   --  LM: Normal.  LAD:   --  LAD: Normal.  --  D1: Normal.  CX:   --  Proximal circumflex: Normal. There was no significant restenosis.  --  Mid circumflex: Normal.  --  Distal circumflex: There was a tubular 100 % stenosis.  --  OM1: There was a 100 % stenosis. It is stent jailed.  --  OM2: Normal. There was no significant restenosis.  RI:   --  Ramus intermedius: Normal. There was no significant restenosis.  RCA:   --  RCA: The vessel was very large sized (dominant).  --  Proximal RCA: Normal.  --  Mid RCA: There was a discrete 40 % stenosis.  --  Distal RCA: Normal.  --  RPDA: Normal.  --  RPL1: Normal.  COMPLICATIONS: No complications occurred during the cath lab visit.  DIAGNOSTIC IMPRESSIONS: Widely patent stents in proximal LCx, OM2 and Ramus  intermedius. 100% distal LCx chronic total occlusion.  DIAGNOSTIC RECOMMENDATIONS: S/p successful PTCA/Stent of 100% distal LCx  chronic total occlusion to 0% residual using DRE.  INTERVENTIONAL IMPRESSIONS: Widely patent stents in proximal LCx, OM2 and  Ramus intermedius. 100% distal LCx chronic total occlusion.  INTERVENTIONAL RECOMMENDATIONS: S/p successful PTCA/Stent of 100% distal  LCx chronic total occlusion to 0% residual using DRE. Patient management  should include aggressive medical therapy and an exercise program. The  patient should follow a low fat diet.  Prepared and signed by  Damián Bhatti M.D.  Signed 01/31/2014 15:41:09    < end of copied text >    ECHO     EF 35%     < from: TTE with Doppler (w/Cont) (01.11.18 @ 14:33) >  CONCLUSIONS:  1. Mitral annular calcification, otherwise normal mitral  valve. Minimal mitral regurgitation.  2. Moderate left ventricular enlargement.  3. Endocardium not well visualized; grossly moderate to  severe global left ventricular systolic dysfunction.  Endocardial visualization enhanced with intravenous  injection of echo contrast (Definity).  No LV thrombus  seen.  4. Normal right ventricular size and function.    < end of copied text >    CATH     < from: Cardiac Cath Lab - Adult (01.12.18 @ 13:38) >  VENTRICLES: No left ventriculogram was performed.  CORONARY VESSELS: The coronary circulation is right dominant.  LM:   --  LM: Normal.  LAD:   --  LAD: Angiography showed mild atherosclerosis with no flow  limiting lesions.  --  D1: There was a tubular 20 % stenosis at the site of a prior stent. The  lesion was eccentric. There was TIMIgrade 3 flow through the vessel  (brisk flow).  CX:   --  Circumflex: Angiography showed mild atherosclerosis with no flow  limiting lesions. There was a tubular 10 % stenosis at the site of a prior  stent. The lesion was eccentric. There was DANIEL grade 3 flow through the  vessel (brisk flow).  --  Mid circumflex: There was a diffuse 20 % stenosis at the site of a  prior stent. The lesion was eccentric. There was DANIEL grade 3 flow through  the vessel (brisk flow).  --  OM1: There was a tubular 20% stenosis at the site of a prior stent.  The lesion was smoothly contoured. There was DANIEL grade 3 flow through the  vessel (brisk flow).  RCA:   --  RCA: Angiography showed mild atherosclerosis with no flow  limiting lesions.  COMPLICATIONS: There were no complications.  DIAGNOSTIC IMPRESSIONS: Patent stents Diagonal and LCx  Non-obstructive CAD    < end of copied text >        ASSESSMENT AND PLAN:  56 y/o M with hx of HTN, HLD, CAD s/p mult PCI, last DRE to  of dLCx in Jan 2014, presents with dizziness x 2-3 days, HUBBARD and B/L LE and UE weakness associated with the dizziness.     --Trop T negative x2  --CT head negative  --F/U Neuro recommendations   -- TTE w/ LV dysfunction EF 35%   --CATH as noted above patent stents       c/w medical management   -- Ep consult for AICD candidacy

## 2018-01-14 LAB
BUN SERPL-MCNC: 27 MG/DL — HIGH (ref 7–23)
CALCIUM SERPL-MCNC: 8.8 MG/DL — SIGNIFICANT CHANGE UP (ref 8.4–10.5)
CHLORIDE SERPL-SCNC: 100 MMOL/L — SIGNIFICANT CHANGE UP (ref 98–107)
CO2 SERPL-SCNC: 26 MMOL/L — SIGNIFICANT CHANGE UP (ref 22–31)
CREAT SERPL-MCNC: 1.25 MG/DL — SIGNIFICANT CHANGE UP (ref 0.5–1.3)
GLUCOSE SERPL-MCNC: 81 MG/DL — SIGNIFICANT CHANGE UP (ref 70–99)
HCT VFR BLD CALC: 43 % — SIGNIFICANT CHANGE UP (ref 39–50)
HGB BLD-MCNC: 14.5 G/DL — SIGNIFICANT CHANGE UP (ref 13–17)
MCHC RBC-ENTMCNC: 29.5 PG — SIGNIFICANT CHANGE UP (ref 27–34)
MCHC RBC-ENTMCNC: 33.7 % — SIGNIFICANT CHANGE UP (ref 32–36)
MCV RBC AUTO: 87.4 FL — SIGNIFICANT CHANGE UP (ref 80–100)
NRBC # FLD: 0 — SIGNIFICANT CHANGE UP
PLATELET # BLD AUTO: 181 K/UL — SIGNIFICANT CHANGE UP (ref 150–400)
PMV BLD: 12.2 FL — SIGNIFICANT CHANGE UP (ref 7–13)
POTASSIUM SERPL-MCNC: 4.2 MMOL/L — SIGNIFICANT CHANGE UP (ref 3.5–5.3)
POTASSIUM SERPL-SCNC: 4.2 MMOL/L — SIGNIFICANT CHANGE UP (ref 3.5–5.3)
RBC # BLD: 4.92 M/UL — SIGNIFICANT CHANGE UP (ref 4.2–5.8)
RBC # FLD: 12.9 % — SIGNIFICANT CHANGE UP (ref 10.3–14.5)
SODIUM SERPL-SCNC: 138 MMOL/L — SIGNIFICANT CHANGE UP (ref 135–145)
WBC # BLD: 6.58 K/UL — SIGNIFICANT CHANGE UP (ref 3.8–10.5)
WBC # FLD AUTO: 6.58 K/UL — SIGNIFICANT CHANGE UP (ref 3.8–10.5)

## 2018-01-14 RX ADMIN — SODIUM CHLORIDE 3 MILLILITER(S): 9 INJECTION INTRAMUSCULAR; INTRAVENOUS; SUBCUTANEOUS at 22:59

## 2018-01-14 RX ADMIN — CLOPIDOGREL BISULFATE 75 MILLIGRAM(S): 75 TABLET, FILM COATED ORAL at 13:06

## 2018-01-14 RX ADMIN — SODIUM CHLORIDE 3 MILLILITER(S): 9 INJECTION INTRAMUSCULAR; INTRAVENOUS; SUBCUTANEOUS at 13:07

## 2018-01-14 RX ADMIN — Medication 81 MILLIGRAM(S): at 13:06

## 2018-01-14 RX ADMIN — LOSARTAN POTASSIUM 100 MILLIGRAM(S): 100 TABLET, FILM COATED ORAL at 18:09

## 2018-01-14 RX ADMIN — CARVEDILOL PHOSPHATE 12.5 MILLIGRAM(S): 80 CAPSULE, EXTENDED RELEASE ORAL at 05:14

## 2018-01-14 RX ADMIN — SIMVASTATIN 40 MILLIGRAM(S): 20 TABLET, FILM COATED ORAL at 22:58

## 2018-01-14 RX ADMIN — SODIUM CHLORIDE 3 MILLILITER(S): 9 INJECTION INTRAMUSCULAR; INTRAVENOUS; SUBCUTANEOUS at 05:14

## 2018-01-14 RX ADMIN — CARVEDILOL PHOSPHATE 12.5 MILLIGRAM(S): 80 CAPSULE, EXTENDED RELEASE ORAL at 18:09

## 2018-01-14 RX ADMIN — AMLODIPINE BESYLATE 5 MILLIGRAM(S): 2.5 TABLET ORAL at 13:06

## 2018-01-14 RX ADMIN — ISOSORBIDE MONONITRATE 30 MILLIGRAM(S): 60 TABLET, EXTENDED RELEASE ORAL at 18:09

## 2018-01-14 RX ADMIN — HEPARIN SODIUM 5000 UNIT(S): 5000 INJECTION INTRAVENOUS; SUBCUTANEOUS at 05:14

## 2018-01-14 RX ADMIN — Medication 20 MILLIGRAM(S): at 05:14

## 2018-01-14 RX ADMIN — FAMOTIDINE 20 MILLIGRAM(S): 10 INJECTION INTRAVENOUS at 13:05

## 2018-01-14 NOTE — PROGRESS NOTE ADULT - SUBJECTIVE AND OBJECTIVE BOX
SUBJECTIVE: no cp or SOB    MEDICATIONS  (STANDING):  amLODIPine   Tablet 5 milliGRAM(s) Oral <User Schedule>  aspirin enteric coated 81 milliGRAM(s) Oral daily  carvedilol 12.5 milliGRAM(s) Oral every 12 hours  clopidogrel Tablet 75 milliGRAM(s) Oral daily  famotidine    Tablet 20 milliGRAM(s) Oral daily  furosemide    Tablet 20 milliGRAM(s) Oral daily  heparin  Injectable 5000 Unit(s) SubCutaneous every 8 hours  isosorbide   mononitrate ER Tablet (IMDUR) 30 milliGRAM(s) Oral <User Schedule>  losartan 100 milliGRAM(s) Oral <User Schedule>  simvastatin 40 milliGRAM(s) Oral at bedtime  sodium chloride 0.9% lock flush 3 milliLiter(s) IV Push every 8 hours    MEDICATIONS  (PRN):  acetaminophen   Tablet. 650 milliGRAM(s) Oral once PRN Mild/moderate/severe pain  acetaminophen   Tablet. 650 milliGRAM(s) Oral every 6 hours PRN headache      LABS:                        14.5   6.58  )-----------( 181      ( 14 Jan 2018 06:20 )             43.0     Hemoglobin: 14.5 g/dL (01-14 @ 06:20)  Hemoglobin: 14.9 g/dL (01-13 @ 06:40)  Hemoglobin: 14.3 g/dL (01-12 @ 05:40)  Hemoglobin: 15.0 g/dL (01-11 @ 05:50)  Hemoglobin: 13.7 g/dL (01-10 @ 11:20)    01-14    138  |  100  |  27<H>  ----------------------------<  81  4.2   |  26  |  1.25    Ca    8.8      14 Jan 2018 06:20      Creatinine Trend: 1.25<--, 1.55<--, 1.09<--, 1.26<--, 1.22<--       PHYSICAL EXAM  Vital Signs Last 24 Hrs  T(C): 36.6 (14 Jan 2018 04:37), Max: 36.8 (13 Jan 2018 21:00)  T(F): 97.8 (14 Jan 2018 04:37), Max: 98.2 (13 Jan 2018 21:00)  HR: 60 (14 Jan 2018 04:37) (60 - 70)  BP: 123/68 (14 Jan 2018 04:37) (109/50 - 123/68)  BP(mean): 71 (13 Jan 2018 21:00) (71 - 71)  RR: 18 (14 Jan 2018 04:37) (18 - 18)  SpO2: 100% (14 Jan 2018 04:37) (99% - 100%)      Cardiovascular:  S1S2 RRR, No JVD, 1/6 VALERIA   Respiratory: Lungs clear to auscultation, normal effort  Gastrointestinal: Abdomen soft, ND, NT, +BS  Ext: No C/C/E BL LE  R radial site soft non tender no sign of active bleed or hematoma     DIAGNOSTIC DATA  TELEMETRY: NSR    < from: CT Head No Cont (01.10.18 @ 17:17) >    IMPRESSION:    Normal non-contrast CT of the brain.    < end of copied text >    < from: Cardiac Cath Lab (01.31.14 @ 13:55) >  VENTRICLES: There were no left ventricular global or regional wall motion  abnormalities.  CORONARY VESSELS: The coronary circulation is right dominant.  LM:   --  LM: Normal.  LAD:   --  LAD: Normal.  --  D1: Normal.  CX:   --  Proximal circumflex: Normal. There was no significant restenosis.  --  Mid circumflex: Normal.  --  Distal circumflex: There was a tubular 100 % stenosis.  --  OM1: There was a 100 % stenosis. It is stent jailed.  --  OM2: Normal. There was no significant restenosis.  RI:   --  Ramus intermedius: Normal. There was no significant restenosis.  RCA:   --  RCA: The vessel was very large sized (dominant).  --  Proximal RCA: Normal.  --  Mid RCA: There was a discrete 40 % stenosis.  --  Distal RCA: Normal.  --  RPDA: Normal.  --  RPL1: Normal.  COMPLICATIONS: No complications occurred during the cath lab visit.  DIAGNOSTIC IMPRESSIONS: Widely patent stents in proximal LCx, OM2 and Ramus  intermedius. 100% distal LCx chronic total occlusion.  DIAGNOSTIC RECOMMENDATIONS: S/p successful PTCA/Stent of 100% distal LCx  chronic total occlusion to 0% residual using DRE.  INTERVENTIONAL IMPRESSIONS: Widely patent stents in proximal LCx, OM2 and  Ramus intermedius. 100% distal LCx chronic total occlusion.  INTERVENTIONAL RECOMMENDATIONS: S/p successful PTCA/Stent of 100% distal  LCx chronic total occlusion to 0% residual using DRE. Patient management  should include aggressive medical therapy and an exercise program. The  patient should follow a low fat diet.  Prepared and signed by  Damián Bhatti M.D.  Signed 01/31/2014 15:41:09    < end of copied text >    ECHO     EF 35%     < from: TTE with Doppler (w/Cont) (01.11.18 @ 14:33) >  CONCLUSIONS:  1. Mitral annular calcification, otherwise normal mitral  valve. Minimal mitral regurgitation.  2. Moderate left ventricular enlargement.  3. Endocardium not well visualized; grossly moderate to  severe global left ventricular systolic dysfunction.  Endocardial visualization enhanced with intravenous  injection of echo contrast (Definity).  No LV thrombus  seen.  4. Normal right ventricular size and function.    < end of copied text >    CATH     < from: Cardiac Cath Lab - Adult (01.12.18 @ 13:38) >  VENTRICLES: No left ventriculogram was performed.  CORONARY VESSELS: The coronary circulation is right dominant.  LM:   --  LM: Normal.  LAD:   --  LAD: Angiography showed mild atherosclerosis with no flow  limiting lesions.  --  D1: There was a tubular 20 % stenosis at the site of a prior stent. The  lesion was eccentric. There was TIMIgrade 3 flow through the vessel  (brisk flow).  CX:   --  Circumflex: Angiography showed mild atherosclerosis with no flow  limiting lesions. There was a tubular 10 % stenosis at the site of a prior  stent. The lesion was eccentric. There was DANIEL grade 3 flow through the  vessel (brisk flow).  --  Mid circumflex: There was a diffuse 20 % stenosis at the site of a  prior stent. The lesion was eccentric. There was DANIEL grade 3 flow through  the vessel (brisk flow).  --  OM1: There was a tubular 20% stenosis at the site of a prior stent.  The lesion was smoothly contoured. There was DANIEL grade 3 flow through the  vessel (brisk flow).  RCA:   --  RCA: Angiography showed mild atherosclerosis with no flow  limiting lesions.  COMPLICATIONS: There were no complications.  DIAGNOSTIC IMPRESSIONS: Patent stents Diagonal and LCx  Non-obstructive CAD    < end of copied text >        ASSESSMENT AND PLAN:  56 y/o M with hx of HTN, HLD, CAD s/p mult PCI, last DRE to  of dLCx in Jan 2014, presents with dizziness x 2-3 days, HUBBARD and B/L LE and UE weakness associated with the dizziness.     --Trop T negative x2  --CT head negative  --F/U Neuro recommendations   -- TTE w/ LV dysfunction EF 35%   --CATH as noted above patent stents       c/w medical management   -- Ep input appreciated plan for BiV ICD on Wednesday

## 2018-01-14 NOTE — PROGRESS NOTE ADULT - SUBJECTIVE AND OBJECTIVE BOX
EP ATTENDING    no palpitations, no syncope, no angina    acetaminophen   Tablet. 650 milliGRAM(s) Oral once PRN  acetaminophen   Tablet. 650 milliGRAM(s) Oral every 6 hours PRN  amLODIPine   Tablet 5 milliGRAM(s) Oral <User Schedule>  aspirin enteric coated 81 milliGRAM(s) Oral daily  carvedilol 12.5 milliGRAM(s) Oral every 12 hours  clopidogrel Tablet 75 milliGRAM(s) Oral daily  famotidine    Tablet 20 milliGRAM(s) Oral daily  furosemide    Tablet 20 milliGRAM(s) Oral daily  heparin  Injectable 5000 Unit(s) SubCutaneous every 8 hours  isosorbide   mononitrate ER Tablet (IMDUR) 30 milliGRAM(s) Oral <User Schedule>  losartan 100 milliGRAM(s) Oral <User Schedule>  simvastatin 40 milliGRAM(s) Oral at bedtime  sodium chloride 0.9% lock flush 3 milliLiter(s) IV Push every 8 hours                            14.5   6.58  )-----------( 181      ( 14 Jan 2018 06:20 )             43.0       01-14    138  |  100  |  27<H>  ----------------------------<  81  4.2   |  26  |  1.25    Ca    8.8      14 Jan 2018 06:20        T(C): 36.6 (01-14-18 @ 04:37), Max: 36.8 (01-13-18 @ 21:00)  HR: 60 (01-14-18 @ 04:37) (60 - 66)  BP: 128/74 (01-14-18 @ 13:03) (113/56 - 128/74)  RR: 18 (01-14-18 @ 04:37) (18 - 18)  SpO2: 100% (01-14-18 @ 04:37) (99% - 100%)  Wt(kg): --    no JVD  RRR, no murmurs  CTAB  soft nt/nd  no c/c/e      A/P) He is a pleasant 56 y/o male with a long standing history of CAD and LBBB who is now admitted with presyncope. A repeat cath showed patent stents and an echo showed severe LV dysfunction (35%). He has been on optimal medical therapy with coreg and losartan for greater than 9 months. In addition he has NYHA Class III symptoms of CHF with dyspnea on exertion and orthopnea. His signs and symptoms of presyncope are concerning for a ventricular arrhythmia or even transient AV block given existing LBBB. He denies syncope.    -given the above I recommended a BiV-ICD. I cited a 3% risk of bleeding or infection, and a 1% risk of major complication including but not limited to heart attack, stroke, death or need for emergency open heart surgery or pericardiocentesis.   -understanding his R/B/A he elects BiV-ICD implantation  -NPO after midnight Tuesday night  -BiV-ICD ([employer] for LV only pacing) wednesday AM      Rayne Henao M.D., [facility]  Cardiac Electrophysiology  [phone]

## 2018-01-14 NOTE — PROGRESS NOTE ADULT - SUBJECTIVE AND OBJECTIVE BOX
Patient is a 57y old  Male who presents with a chief complaint of Dizziness (10 Erick 2018 19:56)  no cp, no SOB      INTERVAL HPI/OVERNIGHT EVENTS:  T(C): 36.6 (01-14-18 @ 04:37), Max: 36.8 (01-13-18 @ 21:00)  HR: 60 (01-14-18 @ 04:37) (60 - 66)  BP: 128/74 (01-14-18 @ 13:03) (113/56 - 128/74)  RR: 18 (01-14-18 @ 04:37) (18 - 18)  SpO2: 100% (01-14-18 @ 04:37) (99% - 100%)  Wt(kg): --  I&O's Summary      LABS:                        14.5   6.58  )-----------( 181      ( 14 Jan 2018 06:20 )             43.0     01-14    138  |  100  |  27<H>  ----------------------------<  81  4.2   |  26  |  1.25    Ca    8.8      14 Jan 2018 06:20          CAPILLARY BLOOD GLUCOSE                MEDICATIONS  (STANDING):  amLODIPine   Tablet 5 milliGRAM(s) Oral <User Schedule>  aspirin enteric coated 81 milliGRAM(s) Oral daily  carvedilol 12.5 milliGRAM(s) Oral every 12 hours  clopidogrel Tablet 75 milliGRAM(s) Oral daily  famotidine    Tablet 20 milliGRAM(s) Oral daily  furosemide    Tablet 20 milliGRAM(s) Oral daily  heparin  Injectable 5000 Unit(s) SubCutaneous every 8 hours  isosorbide   mononitrate ER Tablet (IMDUR) 30 milliGRAM(s) Oral <User Schedule>  losartan 100 milliGRAM(s) Oral <User Schedule>  simvastatin 40 milliGRAM(s) Oral at bedtime  sodium chloride 0.9% lock flush 3 milliLiter(s) IV Push every 8 hours    MEDICATIONS  (PRN):  acetaminophen   Tablet. 650 milliGRAM(s) Oral once PRN Mild/moderate/severe pain  acetaminophen   Tablet. 650 milliGRAM(s) Oral every 6 hours PRN headache          PHYSICAL EXAM:  GENERAL: NAD, well-groomed, well-developed  HEAD:  Atraumatic, Normocephalic  CHEST/LUNG: Clear to percussion bilaterally; No rales, rhonchi, wheezing, or rubs  HEART: Regular rate and rhythm; No murmurs, rubs, or gallops  ABDOMEN: Soft, Nontender, Nondistended; Bowel sounds present  EXTREMITIES:  2+ Peripheral Pulses, No clubbing, cyanosis, or edema  LYMPH: No lymphadenopathy noted  SKIN: No rashes or lesions    Care Discussed with Consultants/Other Providers [ ] YES  [ ] NO

## 2018-01-14 NOTE — PROGRESS NOTE ADULT - ATTENDING COMMENTS
Patient seen and examined.  Agree with above.   -BiV AICD per EPS    Sophie Pollard MD  [facility]  [address], [id number]  [place] [address]  office: [phone]  pager: [phone]

## 2018-01-15 LAB
BUN SERPL-MCNC: 21 MG/DL — SIGNIFICANT CHANGE UP (ref 7–23)
CALCIUM SERPL-MCNC: 8.8 MG/DL — SIGNIFICANT CHANGE UP (ref 8.4–10.5)
CHLORIDE SERPL-SCNC: 100 MMOL/L — SIGNIFICANT CHANGE UP (ref 98–107)
CO2 SERPL-SCNC: 24 MMOL/L — SIGNIFICANT CHANGE UP (ref 22–31)
CREAT SERPL-MCNC: 1.11 MG/DL — SIGNIFICANT CHANGE UP (ref 0.5–1.3)
GLUCOSE SERPL-MCNC: 87 MG/DL — SIGNIFICANT CHANGE UP (ref 70–99)
HCT VFR BLD CALC: 43 % — SIGNIFICANT CHANGE UP (ref 39–50)
HGB BLD-MCNC: 14.1 G/DL — SIGNIFICANT CHANGE UP (ref 13–17)
MCHC RBC-ENTMCNC: 28.3 PG — SIGNIFICANT CHANGE UP (ref 27–34)
MCHC RBC-ENTMCNC: 32.8 % — SIGNIFICANT CHANGE UP (ref 32–36)
MCV RBC AUTO: 86.3 FL — SIGNIFICANT CHANGE UP (ref 80–100)
NRBC # FLD: 0 — SIGNIFICANT CHANGE UP
PLATELET # BLD AUTO: 188 K/UL — SIGNIFICANT CHANGE UP (ref 150–400)
PMV BLD: 12.6 FL — SIGNIFICANT CHANGE UP (ref 7–13)
POTASSIUM SERPL-MCNC: 4.4 MMOL/L — SIGNIFICANT CHANGE UP (ref 3.5–5.3)
POTASSIUM SERPL-SCNC: 4.4 MMOL/L — SIGNIFICANT CHANGE UP (ref 3.5–5.3)
RBC # BLD: 4.98 M/UL — SIGNIFICANT CHANGE UP (ref 4.2–5.8)
RBC # FLD: 12.7 % — SIGNIFICANT CHANGE UP (ref 10.3–14.5)
SODIUM SERPL-SCNC: 136 MMOL/L — SIGNIFICANT CHANGE UP (ref 135–145)
WBC # BLD: 5.93 K/UL — SIGNIFICANT CHANGE UP (ref 3.8–10.5)
WBC # FLD AUTO: 5.93 K/UL — SIGNIFICANT CHANGE UP (ref 3.8–10.5)

## 2018-01-15 RX ADMIN — Medication 81 MILLIGRAM(S): at 13:33

## 2018-01-15 RX ADMIN — SIMVASTATIN 40 MILLIGRAM(S): 20 TABLET, FILM COATED ORAL at 21:03

## 2018-01-15 RX ADMIN — Medication 20 MILLIGRAM(S): at 05:33

## 2018-01-15 RX ADMIN — ISOSORBIDE MONONITRATE 30 MILLIGRAM(S): 60 TABLET, EXTENDED RELEASE ORAL at 13:33

## 2018-01-15 RX ADMIN — SODIUM CHLORIDE 3 MILLILITER(S): 9 INJECTION INTRAMUSCULAR; INTRAVENOUS; SUBCUTANEOUS at 13:34

## 2018-01-15 RX ADMIN — AMLODIPINE BESYLATE 5 MILLIGRAM(S): 2.5 TABLET ORAL at 13:34

## 2018-01-15 RX ADMIN — SODIUM CHLORIDE 3 MILLILITER(S): 9 INJECTION INTRAMUSCULAR; INTRAVENOUS; SUBCUTANEOUS at 21:06

## 2018-01-15 RX ADMIN — SODIUM CHLORIDE 3 MILLILITER(S): 9 INJECTION INTRAMUSCULAR; INTRAVENOUS; SUBCUTANEOUS at 05:33

## 2018-01-15 RX ADMIN — FAMOTIDINE 20 MILLIGRAM(S): 10 INJECTION INTRAVENOUS at 13:33

## 2018-01-15 RX ADMIN — CARVEDILOL PHOSPHATE 12.5 MILLIGRAM(S): 80 CAPSULE, EXTENDED RELEASE ORAL at 05:33

## 2018-01-15 RX ADMIN — CARVEDILOL PHOSPHATE 12.5 MILLIGRAM(S): 80 CAPSULE, EXTENDED RELEASE ORAL at 21:06

## 2018-01-15 RX ADMIN — CLOPIDOGREL BISULFATE 75 MILLIGRAM(S): 75 TABLET, FILM COATED ORAL at 13:33

## 2018-01-15 RX ADMIN — LOSARTAN POTASSIUM 100 MILLIGRAM(S): 100 TABLET, FILM COATED ORAL at 21:03

## 2018-01-15 NOTE — PROGRESS NOTE ADULT - SUBJECTIVE AND OBJECTIVE BOX
SUBJECTIVE: no cp or SOB    acetaminophen   Tablet. 650 milliGRAM(s) Oral once PRN  acetaminophen   Tablet. 650 milliGRAM(s) Oral every 6 hours PRN  amLODIPine   Tablet 5 milliGRAM(s) Oral <User Schedule>  aspirin enteric coated 81 milliGRAM(s) Oral daily  carvedilol 12.5 milliGRAM(s) Oral every 12 hours  clopidogrel Tablet 75 milliGRAM(s) Oral daily  famotidine    Tablet 20 milliGRAM(s) Oral daily  furosemide    Tablet 20 milliGRAM(s) Oral daily  heparin  Injectable 5000 Unit(s) SubCutaneous every 8 hours  isosorbide   mononitrate ER Tablet (IMDUR) 30 milliGRAM(s) Oral <User Schedule>  losartan 100 milliGRAM(s) Oral <User Schedule>  simvastatin 40 milliGRAM(s) Oral at bedtime  sodium chloride 0.9% lock flush 3 milliLiter(s) IV Push every 8 hours                            14.1   5.93  )-----------( 188      ( 15 Erick 2018 06:42 )             43.0       01-15    136  |  100  |  21  ----------------------------<  87  4.4   |  24  |  1.11    Ca    8.8      15 Erick 2018 06:42              T(C): 36.6 (01-15-18 @ 05:31), Max: 37 (01-14-18 @ 21:21)  HR: 62 (01-15-18 @ 07:10) (62 - 75)  BP: 109/66 (01-15-18 @ 07:10) (108/66 - 133/82)  RR: 18 (01-15-18 @ 05:31) (18 - 18)  SpO2: 100% (01-15-18 @ 05:31) (100% - 100%)  Wt(kg): --    I&O's Summary      Cardiovascular:  S1S2 RRR, No JVD, no m/r/g  Respiratory: Lungs clear to auscultation, normal effort  Gastrointestinal: Abdomen soft, ND, NT, +BS  Ext: No C/C/E BL LE  R radial site soft non tender no sign of active bleed or hematoma     DIAGNOSTIC DATA  TELEMETRY: NSR    < from: CT Head No Cont (01.10.18 @ 17:17) >    IMPRESSION:    Normal non-contrast CT of the brain.    < end of copied text >    < from: Cardiac Cath Lab (01.31.14 @ 13:55) >  VENTRICLES: There were no left ventricular global or regional wall motion  abnormalities.  CORONARY VESSELS: The coronary circulation is right dominant.  LM:   --  LM: Normal.  LAD:   --  LAD: Normal.  --  D1: Normal.  CX:   --  Proximal circumflex: Normal. There was no significant restenosis.  --  Mid circumflex: Normal.  --  Distal circumflex: There was a tubular 100 % stenosis.  --  OM1: There was a 100 % stenosis. It is stent jailed.  --  OM2: Normal. There was no significant restenosis.  RI:   --  Ramus intermedius: Normal. There was no significant restenosis.  RCA:   --  RCA: The vessel was very large sized (dominant).  --  Proximal RCA: Normal.  --  Mid RCA: There was a discrete 40 % stenosis.  --  Distal RCA: Normal.  --  RPDA: Normal.  --  RPL1: Normal.  COMPLICATIONS: No complications occurred during the cath lab visit.  DIAGNOSTIC IMPRESSIONS: Widely patent stents in proximal LCx, OM2 and Ramus  intermedius. 100% distal LCx chronic total occlusion.  DIAGNOSTIC RECOMMENDATIONS: S/p successful PTCA/Stent of 100% distal LCx  chronic total occlusion to 0% residual using DRE.  INTERVENTIONAL IMPRESSIONS: Widely patent stents in proximal LCx, OM2 and  Ramus intermedius. 100% distal LCx chronic total occlusion.  INTERVENTIONAL RECOMMENDATIONS: S/p successful PTCA/Stent of 100% distal  LCx chronic total occlusion to 0% residual using DRE. Patient management  should include aggressive medical therapy and an exercise program. The  patient should follow a low fat diet.  Prepared and signed by  Damián Bhatti M.D.  Signed 01/31/2014 15:41:09    < end of copied text >    ECHO     EF 35%     < from: TTE with Doppler (w/Cont) (01.11.18 @ 14:33) >  CONCLUSIONS:  1. Mitral annular calcification, otherwise normal mitral  valve. Minimal mitral regurgitation.  2. Moderate left ventricular enlargement.  3. Endocardium not well visualized; grossly moderate to  severe global left ventricular systolic dysfunction.  Endocardial visualization enhanced with intravenous  injection of echo contrast (Definity).  No LV thrombus  seen.  4. Normal right ventricular size and function.    < end of copied text >    CATH     < from: Cardiac Cath Lab - Adult (01.12.18 @ 13:38) >  VENTRICLES: No left ventriculogram was performed.  CORONARY VESSELS: The coronary circulation is right dominant.  LM:   --  LM: Normal.  LAD:   --  LAD: Angiography showed mild atherosclerosis with no flow  limiting lesions.  --  D1: There was a tubular 20 % stenosis at the site of a prior stent. The  lesion was eccentric. There was TIMIgrade 3 flow through the vessel  (brisk flow).  CX:   --  Circumflex: Angiography showed mild atherosclerosis with no flow  limiting lesions. There was a tubular 10 % stenosis at the site of a prior  stent. The lesion was eccentric. There was DANIEL grade 3 flow through the  vessel (brisk flow).  --  Mid circumflex: There was a diffuse 20 % stenosis at the site of a  prior stent. The lesion was eccentric. There was DANIEL grade 3 flow through  the vessel (brisk flow).  --  OM1: There was a tubular 20% stenosis at the site of a prior stent.  The lesion was smoothly contoured. There was DANIEL grade 3 flow through the  vessel (brisk flow).  RCA:   --  RCA: Angiography showed mild atherosclerosis with no flow  limiting lesions.  COMPLICATIONS: There were no complications.  DIAGNOSTIC IMPRESSIONS: Patent stents Diagonal and LCx  Non-obstructive CAD    < end of copied text >        ASSESSMENT AND PLAN:  56 y/o M with hx of HTN, HLD, CAD s/p mult PCI, last DRE to  of dLCx in Jan 2014, presents with dizziness x 2-3 days, HUBBARD and B/L LE and UE weakness associated with the dizziness.     -continue with medical management of cardiomyopathy and cad  -continue with asa  -BiV AICD per EPS  -f/u ep    Sophie Pollard MD  [facility]  [address] [facility] [facility], [address]  [place] [address]  office: [phone]  pager: [phone]

## 2018-01-15 NOTE — PROGRESS NOTE ADULT - SUBJECTIVE AND OBJECTIVE BOX
EP ATTENDING    no palpitations, no syncope, no angina    Tele: NSR    MEDICATIONS  (STANDING):  amLODIPine   Tablet 5 milliGRAM(s) Oral <User Schedule>  aspirin enteric coated 81 milliGRAM(s) Oral daily  carvedilol 12.5 milliGRAM(s) Oral every 12 hours  clopidogrel Tablet 75 milliGRAM(s) Oral daily  famotidine    Tablet 20 milliGRAM(s) Oral daily  furosemide    Tablet 20 milliGRAM(s) Oral daily  heparin  Injectable 5000 Unit(s) SubCutaneous every 8 hours  isosorbide   mononitrate ER Tablet (IMDUR) 30 milliGRAM(s) Oral <User Schedule>  losartan 100 milliGRAM(s) Oral <User Schedule>  simvastatin 40 milliGRAM(s) Oral at bedtime  sodium chloride 0.9% lock flush 3 milliLiter(s) IV Push every 8 hours    LABS:                        14.1   5.93  )-----------( 188      ( 15 Erick 2018 06:42 )             43.0     136  |  100  |  21  ----------------------------<  87  4.4   |  24  |  1.11    Ca    8.8      15 Erick 2018 06:42     PHYSICAL EXAM  Vital Signs Last 24 Hrs  T(C): 36.6 (15 Erick 2018 05:31), Max: 37 (14 Jan 2018 21:21)  T(F): 97.9 (15 Erick 2018 05:31), Max: 98.6 (14 Jan 2018 21:21)  HR: 62 (15 Erick 2018 07:10) (62 - 75)  BP: 109/66 (15 Erick 2018 07:10) (108/66 - 133/82)  RR: 18 (15 Erick 2018 05:31) (18 - 18)  SpO2: 100% (15 Erick 2018 05:31) (100% - 100%)    no JVD  RRR, no murmurs  CTAB  soft nt/nd  no c/c/e      A/P) He is a pleasant 56 y/o male with a long standing history of CAD and LBBB who is now admitted with presyncope. A repeat cath showed patent stents and an echo showed severe LV dysfunction (35%). He has been on optimal medical therapy with coreg and losartan for greater than 9 months. In addition he has NYHA Class III symptoms of CHF with dyspnea on exertion and orthopnea. His signs and symptoms of presyncope are concerning for a ventricular arrhythmia or even transient AV block given existing LBBB. He denies syncope.    -given the above I recommended a BiV-ICD. I cited a 3% risk of bleeding or infection, and a 1% risk of major complication including but not limited to heart attack, stroke, death or need for emergency open heart surgery or pericardiocentesis.   -understanding his R/B/A he elects BiV-ICD implantation  -NPO after midnight Tuesday night  -BiV-ICD (Medtronic for LV only pacing) Wednesday AM

## 2018-01-15 NOTE — PROGRESS NOTE ADULT - SUBJECTIVE AND OBJECTIVE BOX
Patient is a 57y old  Male who presents with a chief complaint of Dizziness (10 Erick 2018 19:56)  NAD    INTERVAL HPI/OVERNIGHT EVENTS:  T(C): 36.6 (01-15-18 @ 05:31), Max: 37 (01-14-18 @ 21:21)  HR: 62 (01-15-18 @ 07:10) (62 - 75)  BP: 109/66 (01-15-18 @ 07:10) (108/66 - 133/82)  RR: 18 (01-15-18 @ 05:31) (18 - 18)  SpO2: 100% (01-15-18 @ 05:31) (100% - 100%)  Wt(kg): --  I&O's Summary      LABS:                        14.1   5.93  )-----------( 188      ( 15 Erick 2018 06:42 )             43.0     01-15    136  |  100  |  21  ----------------------------<  87  4.4   |  24  |  1.11    Ca    8.8      15 Erick 2018 06:42          CAPILLARY BLOOD GLUCOSE                MEDICATIONS  (STANDING):  amLODIPine   Tablet 5 milliGRAM(s) Oral <User Schedule>  aspirin enteric coated 81 milliGRAM(s) Oral daily  carvedilol 12.5 milliGRAM(s) Oral every 12 hours  clopidogrel Tablet 75 milliGRAM(s) Oral daily  famotidine    Tablet 20 milliGRAM(s) Oral daily  furosemide    Tablet 20 milliGRAM(s) Oral daily  heparin  Injectable 5000 Unit(s) SubCutaneous every 8 hours  isosorbide   mononitrate ER Tablet (IMDUR) 30 milliGRAM(s) Oral <User Schedule>  losartan 100 milliGRAM(s) Oral <User Schedule>  simvastatin 40 milliGRAM(s) Oral at bedtime  sodium chloride 0.9% lock flush 3 milliLiter(s) IV Push every 8 hours    MEDICATIONS  (PRN):  acetaminophen   Tablet. 650 milliGRAM(s) Oral once PRN Mild/moderate/severe pain  acetaminophen   Tablet. 650 milliGRAM(s) Oral every 6 hours PRN headache          PHYSICAL EXAM:  GENERAL: NAD, well-groomed, well-developed  HEAD:  Atraumatic, Normocephalic  CHEST/LUNG: Clear to percussion bilaterally; No rales, rhonchi, wheezing, or rubs  HEART: Regular rate and rhythm; No murmurs, rubs, or gallops  ABDOMEN: Soft, Nontender, Nondistended; Bowel sounds present  EXTREMITIES:  2+ Peripheral Pulses, No clubbing, cyanosis, or edema  LYMPH: No lymphadenopathy noted  SKIN: No rashes or lesions    Care Discussed with Consultants/Other Providers [+ ] YES  [ ] NO

## 2018-01-16 LAB
BUN SERPL-MCNC: 17 MG/DL — SIGNIFICANT CHANGE UP (ref 7–23)
CALCIUM SERPL-MCNC: 8.9 MG/DL — SIGNIFICANT CHANGE UP (ref 8.4–10.5)
CHLORIDE SERPL-SCNC: 101 MMOL/L — SIGNIFICANT CHANGE UP (ref 98–107)
CO2 SERPL-SCNC: 24 MMOL/L — SIGNIFICANT CHANGE UP (ref 22–31)
CREAT SERPL-MCNC: 1.14 MG/DL — SIGNIFICANT CHANGE UP (ref 0.5–1.3)
GLUCOSE SERPL-MCNC: 89 MG/DL — SIGNIFICANT CHANGE UP (ref 70–99)
HCT VFR BLD CALC: 43.8 % — SIGNIFICANT CHANGE UP (ref 39–50)
HGB BLD-MCNC: 14.6 G/DL — SIGNIFICANT CHANGE UP (ref 13–17)
MCHC RBC-ENTMCNC: 29.2 PG — SIGNIFICANT CHANGE UP (ref 27–34)
MCHC RBC-ENTMCNC: 33.3 % — SIGNIFICANT CHANGE UP (ref 32–36)
MCV RBC AUTO: 87.6 FL — SIGNIFICANT CHANGE UP (ref 80–100)
NRBC # FLD: 0 — SIGNIFICANT CHANGE UP
PLATELET # BLD AUTO: 184 K/UL — SIGNIFICANT CHANGE UP (ref 150–400)
PMV BLD: 12.6 FL — SIGNIFICANT CHANGE UP (ref 7–13)
POTASSIUM SERPL-MCNC: 4.9 MMOL/L — SIGNIFICANT CHANGE UP (ref 3.5–5.3)
POTASSIUM SERPL-SCNC: 4.9 MMOL/L — SIGNIFICANT CHANGE UP (ref 3.5–5.3)
RBC # BLD: 5 M/UL — SIGNIFICANT CHANGE UP (ref 4.2–5.8)
RBC # FLD: 12.6 % — SIGNIFICANT CHANGE UP (ref 10.3–14.5)
SODIUM SERPL-SCNC: 138 MMOL/L — SIGNIFICANT CHANGE UP (ref 135–145)
WBC # BLD: 5.91 K/UL — SIGNIFICANT CHANGE UP (ref 3.8–10.5)
WBC # FLD AUTO: 5.91 K/UL — SIGNIFICANT CHANGE UP (ref 3.8–10.5)

## 2018-01-16 RX ADMIN — SODIUM CHLORIDE 3 MILLILITER(S): 9 INJECTION INTRAMUSCULAR; INTRAVENOUS; SUBCUTANEOUS at 12:04

## 2018-01-16 RX ADMIN — SODIUM CHLORIDE 3 MILLILITER(S): 9 INJECTION INTRAMUSCULAR; INTRAVENOUS; SUBCUTANEOUS at 05:47

## 2018-01-16 RX ADMIN — SODIUM CHLORIDE 3 MILLILITER(S): 9 INJECTION INTRAMUSCULAR; INTRAVENOUS; SUBCUTANEOUS at 21:08

## 2018-01-16 RX ADMIN — CARVEDILOL PHOSPHATE 12.5 MILLIGRAM(S): 80 CAPSULE, EXTENDED RELEASE ORAL at 17:23

## 2018-01-16 RX ADMIN — Medication 20 MILLIGRAM(S): at 05:47

## 2018-01-16 RX ADMIN — FAMOTIDINE 20 MILLIGRAM(S): 10 INJECTION INTRAVENOUS at 12:04

## 2018-01-16 RX ADMIN — CARVEDILOL PHOSPHATE 12.5 MILLIGRAM(S): 80 CAPSULE, EXTENDED RELEASE ORAL at 05:47

## 2018-01-16 RX ADMIN — ISOSORBIDE MONONITRATE 30 MILLIGRAM(S): 60 TABLET, EXTENDED RELEASE ORAL at 12:04

## 2018-01-16 RX ADMIN — CLOPIDOGREL BISULFATE 75 MILLIGRAM(S): 75 TABLET, FILM COATED ORAL at 12:04

## 2018-01-16 RX ADMIN — SIMVASTATIN 40 MILLIGRAM(S): 20 TABLET, FILM COATED ORAL at 21:08

## 2018-01-16 RX ADMIN — AMLODIPINE BESYLATE 5 MILLIGRAM(S): 2.5 TABLET ORAL at 12:04

## 2018-01-16 RX ADMIN — LOSARTAN POTASSIUM 100 MILLIGRAM(S): 100 TABLET, FILM COATED ORAL at 17:23

## 2018-01-16 RX ADMIN — Medication 81 MILLIGRAM(S): at 12:04

## 2018-01-16 NOTE — PROGRESS NOTE ADULT - SUBJECTIVE AND OBJECTIVE BOX
Patient is a 57y old  Male who presents with a chief complaint of Dizziness (10 Erick 2018 19:56)  NAD      INTERVAL HPI/OVERNIGHT EVENTS:  T(C): 36.4 (01-16-18 @ 12:03), Max: 36.6 (01-15-18 @ 20:53)  HR: 65 (01-16-18 @ 12:03) (62 - 68)  BP: 135/72 (01-16-18 @ 12:03) (103/57 - 135/72)  RR: 17 (01-16-18 @ 12:03) (17 - 18)  SpO2: 100% (01-16-18 @ 12:03) (98% - 100%)  Wt(kg): --  I&O's Summary      LABS:                        14.6   5.91  )-----------( 184      ( 16 Jan 2018 06:00 )             43.8     01-16    138  |  101  |  17  ----------------------------<  89  4.9   |  24  |  1.14    Ca    8.9      16 Jan 2018 06:00          CAPILLARY BLOOD GLUCOSE                MEDICATIONS  (STANDING):  amLODIPine   Tablet 5 milliGRAM(s) Oral <User Schedule>  aspirin enteric coated 81 milliGRAM(s) Oral daily  carvedilol 12.5 milliGRAM(s) Oral every 12 hours  clopidogrel Tablet 75 milliGRAM(s) Oral daily  famotidine    Tablet 20 milliGRAM(s) Oral daily  furosemide    Tablet 20 milliGRAM(s) Oral daily  heparin  Injectable 5000 Unit(s) SubCutaneous every 8 hours  isosorbide   mononitrate ER Tablet (IMDUR) 30 milliGRAM(s) Oral <User Schedule>  losartan 100 milliGRAM(s) Oral <User Schedule>  simvastatin 40 milliGRAM(s) Oral at bedtime  sodium chloride 0.9% lock flush 3 milliLiter(s) IV Push every 8 hours    MEDICATIONS  (PRN):  acetaminophen   Tablet. 650 milliGRAM(s) Oral once PRN Mild/moderate/severe pain  acetaminophen   Tablet. 650 milliGRAM(s) Oral every 6 hours PRN headache          PHYSICAL EXAM:  GENERAL: NAD, well-groomed, well-developed  HEAD:  Atraumatic, Normocephalic  CHEST/LUNG: Clear to percussion bilaterally; No rales, rhonchi, wheezing, or rubs  HEART: Regular rate and rhythm; No murmurs, rubs, or gallops  ABDOMEN: Soft, Nontender, Nondistended; Bowel sounds present  EXTREMITIES:  2+ Peripheral Pulses, No clubbing, cyanosis, or edema  LYMPH: No lymphadenopathy noted  SKIN: No rashes or lesions    Care Discussed with Consultants/Other Providers [+ ] YES  [ ] NO

## 2018-01-16 NOTE — PROGRESS NOTE ADULT - SUBJECTIVE AND OBJECTIVE BOX
SUBJECTIVE: no CP or SOB    MEDICATIONS  (STANDING):  amLODIPine   Tablet 5 milliGRAM(s) Oral <User Schedule>  aspirin enteric coated 81 milliGRAM(s) Oral daily  carvedilol 12.5 milliGRAM(s) Oral every 12 hours  clopidogrel Tablet 75 milliGRAM(s) Oral daily  famotidine    Tablet 20 milliGRAM(s) Oral daily  furosemide    Tablet 20 milliGRAM(s) Oral daily  heparin  Injectable 5000 Unit(s) SubCutaneous every 8 hours  isosorbide   mononitrate ER Tablet (IMDUR) 30 milliGRAM(s) Oral <User Schedule>  losartan 100 milliGRAM(s) Oral <User Schedule>  simvastatin 40 milliGRAM(s) Oral at bedtime  sodium chloride 0.9% lock flush 3 milliLiter(s) IV Push every 8 hours    LABS:                        14.6   5.91  )-----------( 184      ( 16 Jan 2018 06:00 )             43.8     138  |  101  |  17  ----------------------------<  89  4.9   |  24  |  1.14    Ca    8.9      16 Jan 2018 06:00    Creatinine Trend: 1.14<--, 1.11<--, 1.25<--, 1.55<--, 1.09<--, 1.26<--     PHYSICAL EXAM  Vital Signs Last 24 Hrs  T(C): 36.6 (16 Jan 2018 05:44), Max: 36.6 (15 Erick 2018 20:53)  T(F): 97.8 (16 Jan 2018 05:44), Max: 97.8 (15 Erick 2018 20:53)  HR: 63 (16 Jan 2018 05:44) (62 - 70)  BP: 103/65 (16 Jan 2018 05:44) (103/57 - 123/67)  RR: 18 (16 Jan 2018 05:44) (18 - 18)  SpO2: 98% (16 Jan 2018 05:44) (98% - 98%)    Cardiovascular:  S1S2 RRR, No JVD, no m/r/g  Respiratory: Lungs clear to auscultation, normal effort  Gastrointestinal: Abdomen soft, ND, NT, +BS  Ext: No C/C/E BL LE  R radial site soft non tender no sign of active bleed or hematoma     DIAGNOSTIC DATA    TELEMETRY: NSR    < from: CT Head No Cont (01.10.18 @ 17:17) >    IMPRESSION:    Normal non-contrast CT of the brain.    < end of copied text >    < from: Cardiac Cath Lab (01.31.14 @ 13:55) >  VENTRICLES: There were no left ventricular global or regional wall motion  abnormalities.  CORONARY VESSELS: The coronary circulation is right dominant.  LM:   --  LM: Normal.  LAD:   --  LAD: Normal.  --  D1: Normal.  CX:   --  Proximal circumflex: Normal. There was no significant restenosis.  --  Mid circumflex: Normal.  --  Distal circumflex: There was a tubular 100 % stenosis.  --  OM1: There was a 100 % stenosis. It is stent jailed.  --  OM2: Normal. There was no significant restenosis.  RI:   --  Ramus intermedius: Normal. There was no significant restenosis.  RCA:   --  RCA: The vessel was very large sized (dominant).  --  Proximal RCA: Normal.  --  Mid RCA: There was a discrete 40 % stenosis.  --  Distal RCA: Normal.  --  RPDA: Normal.  --  RPL1: Normal.  COMPLICATIONS: No complications occurred during the cath lab visit.  DIAGNOSTIC IMPRESSIONS: Widely patent stents in proximal LCx, OM2 and Ramus  intermedius. 100% distal LCx chronic total occlusion.  DIAGNOSTIC RECOMMENDATIONS: S/p successful PTCA/Stent of 100% distal LCx  chronic total occlusion to 0% residual using DRE.  INTERVENTIONAL IMPRESSIONS: Widely patent stents in proximal LCx, OM2 and  Ramus intermedius. 100% distal LCx chronic total occlusion.  INTERVENTIONAL RECOMMENDATIONS: S/p successful PTCA/Stent of 100% distal  LCx chronic total occlusion to 0% residual using DRE. Patient management  should include aggressive medical therapy and an exercise program. The  patient should follow a low fat diet.  Prepared and signed by  Damián Bhatti M.D.  Signed 01/31/2014 15:41:09    < end of copied text >        EF 35%     < from: TTE with Doppler (w/Cont) (01.11.18 @ 14:33) >  CONCLUSIONS:  1. Mitral annular calcification, otherwise normal mitral  valve. Minimal mitral regurgitation.  2. Moderate left ventricular enlargement.  3. Endocardium not well visualized; grossly moderate to  severe global left ventricular systolic dysfunction.  Endocardial visualization enhanced with intravenous  injection of echo contrast (Definity).  No LV thrombus  seen.  4. Normal right ventricular size and function.    < end of copied text >        < from: Cardiac Cath Lab - Adult (01.12.18 @ 13:38) >  VENTRICLES: No left ventriculogram was performed.  CORONARY VESSELS: The coronary circulation is right dominant.  LM:   --  LM: Normal.  LAD:   --  LAD: Angiography showed mild atherosclerosis with no flow  limiting lesions.  --  D1: There was a tubular 20 % stenosis at the site of a prior stent. The  lesion was eccentric. There was TIMIgrade 3 flow through the vessel  (brisk flow).  CX:   --  Circumflex: Angiography showed mild atherosclerosis with no flow  limiting lesions. There was a tubular 10 % stenosis at the site of a prior  stent. The lesion was eccentric. There was DANIEL grade 3 flow through the  vessel (brisk flow).  --  Mid circumflex: There was a diffuse 20 % stenosis at the site of a  prior stent. The lesion was eccentric. There was DANIEL grade 3 flow through  the vessel (brisk flow).  --  OM1: There was a tubular 20% stenosis at the site of a prior stent.  The lesion was smoothly contoured. There was DANIEL grade 3 flow through the  vessel (brisk flow).  RCA:   --  RCA: Angiography showed mild atherosclerosis with no flow  limiting lesions.  COMPLICATIONS: There were no complications.  DIAGNOSTIC IMPRESSIONS: Patent stents Diagonal and LCx  Non-obstructive CAD    < end of copied text >      ASSESSMENT AND PLAN:  58 y/o M with hx of HTN, HLD, CAD s/p mult PCI, last DRE to  of dLCx in Jan 2014, presents with dizziness x 2-3 days, HUBBARD and B/L LE and UE weakness associated with the dizziness.  Repeat Cath as above with Patent stents.      -BiV AICD per EPS tomorrow  -Cont med Management of Ischemic CM  - Will need close OP Cardio/EP f/u.  Will establish with our practice after DC    Cynthia Tyler PA-C  [facility]  [address], [address]   [place] [address]  office [phone]  pager [phone]

## 2018-01-16 NOTE — PROGRESS NOTE ADULT - ATTENDING COMMENTS
Patient seen and examined.  Agree with above.   -NPO past MN for BiVAICD tomorrow      Sophie Pollard MD  [facility]  [address] [facility], [address]  [place] [address]  office: [phone]  pager: [phone]

## 2018-01-16 NOTE — PROGRESS NOTE ADULT - SUBJECTIVE AND OBJECTIVE BOX
EP ATTENDING    no palpitations, no syncope, no angina    Tele: NSR, LBBB    acetaminophen   Tablet. 650 milliGRAM(s) Oral once PRN  acetaminophen   Tablet. 650 milliGRAM(s) Oral every 6 hours PRN  amLODIPine   Tablet 5 milliGRAM(s) Oral <User Schedule>  aspirin enteric coated 81 milliGRAM(s) Oral daily  carvedilol 12.5 milliGRAM(s) Oral every 12 hours  clopidogrel Tablet 75 milliGRAM(s) Oral daily  famotidine    Tablet 20 milliGRAM(s) Oral daily  furosemide    Tablet 20 milliGRAM(s) Oral daily  heparin  Injectable 5000 Unit(s) SubCutaneous every 8 hours  isosorbide   mononitrate ER Tablet (IMDUR) 30 milliGRAM(s) Oral <User Schedule>  losartan 100 milliGRAM(s) Oral <User Schedule>  simvastatin 40 milliGRAM(s) Oral at bedtime  sodium chloride 0.9% lock flush 3 milliLiter(s) IV Push every 8 hours                            14.6   5.91  )-----------( 184      ( 16 Jan 2018 06:00 )             43.8       01-16    138  |  101  |  17  ----------------------------<  89  4.9   |  24  |  1.14    Ca    8.9      16 Jan 2018 06:00        T(C): 36.6 (01-16-18 @ 05:44), Max: 36.6 (01-15-18 @ 20:53)  HR: 63 (01-16-18 @ 05:44) (62 - 70)  BP: 103/65 (01-16-18 @ 05:44) (103/57 - 123/67)  RR: 18 (01-16-18 @ 05:44) (18 - 18)  SpO2: 98% (01-16-18 @ 05:44) (98% - 98%)  Wt(kg): --    no JVD  RRR, no murmurs  CTAB  soft nt/nd  no c/c/e      A/P) He is a pleasant 58 y/o male with a long standing history of CAD and LBBB who is now admitted with presyncope. A repeat cath showed patent stents and an echo showed severe LV dysfunction (35%). He has been on optimal medical therapy with coreg and losartan for greater than 9 months. In addition he has NYHA Class III symptoms of CHF with dyspnea on exertion and orthopnea. His signs and symptoms of presyncope are concerning for a ventricular arrhythmia or even transient AV block given existing LBBB. He denies syncope.    -given the above I recommended a BiV-ICD. I cited a 3% risk of bleeding or infection, and a 1% risk of major complication including but not limited to heart attack, stroke, death or need for emergency open heart surgery or pericardiocentesis.   -understanding his R/B/A he elects BiV-ICD implantation  -NPO after midnight   -BiV-ICD (Medtronic for LV only pacing) tomorrow afternoon around 3pm

## 2018-01-17 LAB
BASOPHILS # BLD AUTO: 0.03 K/UL — SIGNIFICANT CHANGE UP (ref 0–0.2)
BASOPHILS NFR BLD AUTO: 0.5 % — SIGNIFICANT CHANGE UP (ref 0–2)
BUN SERPL-MCNC: 20 MG/DL — SIGNIFICANT CHANGE UP (ref 7–23)
BUN SERPL-MCNC: 20 MG/DL — SIGNIFICANT CHANGE UP (ref 7–23)
CALCIUM SERPL-MCNC: 9.2 MG/DL — SIGNIFICANT CHANGE UP (ref 8.4–10.5)
CALCIUM SERPL-MCNC: 9.2 MG/DL — SIGNIFICANT CHANGE UP (ref 8.4–10.5)
CHLORIDE SERPL-SCNC: 99 MMOL/L — SIGNIFICANT CHANGE UP (ref 98–107)
CHLORIDE SERPL-SCNC: 99 MMOL/L — SIGNIFICANT CHANGE UP (ref 98–107)
CO2 SERPL-SCNC: 24 MMOL/L — SIGNIFICANT CHANGE UP (ref 22–31)
CO2 SERPL-SCNC: 24 MMOL/L — SIGNIFICANT CHANGE UP (ref 22–31)
CREAT SERPL-MCNC: 1.06 MG/DL — SIGNIFICANT CHANGE UP (ref 0.5–1.3)
CREAT SERPL-MCNC: 1.06 MG/DL — SIGNIFICANT CHANGE UP (ref 0.5–1.3)
EOSINOPHIL # BLD AUTO: 0.24 K/UL — SIGNIFICANT CHANGE UP (ref 0–0.5)
EOSINOPHIL NFR BLD AUTO: 3.9 % — SIGNIFICANT CHANGE UP (ref 0–6)
GLUCOSE SERPL-MCNC: 84 MG/DL — SIGNIFICANT CHANGE UP (ref 70–99)
GLUCOSE SERPL-MCNC: 84 MG/DL — SIGNIFICANT CHANGE UP (ref 70–99)
HCT VFR BLD CALC: 43.3 % — SIGNIFICANT CHANGE UP (ref 39–50)
HCT VFR BLD CALC: 43.3 % — SIGNIFICANT CHANGE UP (ref 39–50)
HGB BLD-MCNC: 14.5 G/DL — SIGNIFICANT CHANGE UP (ref 13–17)
HGB BLD-MCNC: 14.5 G/DL — SIGNIFICANT CHANGE UP (ref 13–17)
IMM GRANULOCYTES # BLD AUTO: 0.02 # — SIGNIFICANT CHANGE UP
IMM GRANULOCYTES NFR BLD AUTO: 0.3 % — SIGNIFICANT CHANGE UP (ref 0–1.5)
LYMPHOCYTES # BLD AUTO: 1.49 K/UL — SIGNIFICANT CHANGE UP (ref 1–3.3)
LYMPHOCYTES # BLD AUTO: 24.2 % — SIGNIFICANT CHANGE UP (ref 13–44)
MAGNESIUM SERPL-MCNC: 1.9 MG/DL — SIGNIFICANT CHANGE UP (ref 1.6–2.6)
MCHC RBC-ENTMCNC: 28.5 PG — SIGNIFICANT CHANGE UP (ref 27–34)
MCHC RBC-ENTMCNC: 28.5 PG — SIGNIFICANT CHANGE UP (ref 27–34)
MCHC RBC-ENTMCNC: 33.5 % — SIGNIFICANT CHANGE UP (ref 32–36)
MCHC RBC-ENTMCNC: 33.5 % — SIGNIFICANT CHANGE UP (ref 32–36)
MCV RBC AUTO: 85.2 FL — SIGNIFICANT CHANGE UP (ref 80–100)
MCV RBC AUTO: 85.2 FL — SIGNIFICANT CHANGE UP (ref 80–100)
MONOCYTES # BLD AUTO: 0.51 K/UL — SIGNIFICANT CHANGE UP (ref 0–0.9)
MONOCYTES NFR BLD AUTO: 8.3 % — SIGNIFICANT CHANGE UP (ref 2–14)
NEUTROPHILS # BLD AUTO: 3.86 K/UL — SIGNIFICANT CHANGE UP (ref 1.8–7.4)
NEUTROPHILS NFR BLD AUTO: 62.8 % — SIGNIFICANT CHANGE UP (ref 43–77)
NRBC # FLD: 0 — SIGNIFICANT CHANGE UP
NRBC # FLD: 0 — SIGNIFICANT CHANGE UP
PLATELET # BLD AUTO: 181 K/UL — SIGNIFICANT CHANGE UP (ref 150–400)
PLATELET # BLD AUTO: 181 K/UL — SIGNIFICANT CHANGE UP (ref 150–400)
PMV BLD: 12.5 FL — SIGNIFICANT CHANGE UP (ref 7–13)
PMV BLD: 12.5 FL — SIGNIFICANT CHANGE UP (ref 7–13)
POTASSIUM SERPL-MCNC: 4.4 MMOL/L — SIGNIFICANT CHANGE UP (ref 3.5–5.3)
POTASSIUM SERPL-MCNC: 4.4 MMOL/L — SIGNIFICANT CHANGE UP (ref 3.5–5.3)
POTASSIUM SERPL-SCNC: 4.4 MMOL/L — SIGNIFICANT CHANGE UP (ref 3.5–5.3)
POTASSIUM SERPL-SCNC: 4.4 MMOL/L — SIGNIFICANT CHANGE UP (ref 3.5–5.3)
RBC # BLD: 5.08 M/UL — SIGNIFICANT CHANGE UP (ref 4.2–5.8)
RBC # BLD: 5.08 M/UL — SIGNIFICANT CHANGE UP (ref 4.2–5.8)
RBC # FLD: 12.5 % — SIGNIFICANT CHANGE UP (ref 10.3–14.5)
RBC # FLD: 12.5 % — SIGNIFICANT CHANGE UP (ref 10.3–14.5)
SODIUM SERPL-SCNC: 136 MMOL/L — SIGNIFICANT CHANGE UP (ref 135–145)
SODIUM SERPL-SCNC: 136 MMOL/L — SIGNIFICANT CHANGE UP (ref 135–145)
WBC # BLD: 6.15 K/UL — SIGNIFICANT CHANGE UP (ref 3.8–10.5)
WBC # BLD: 6.15 K/UL — SIGNIFICANT CHANGE UP (ref 3.8–10.5)
WBC # FLD AUTO: 6.15 K/UL — SIGNIFICANT CHANGE UP (ref 3.8–10.5)
WBC # FLD AUTO: 6.15 K/UL — SIGNIFICANT CHANGE UP (ref 3.8–10.5)

## 2018-01-17 PROCEDURE — 93010 ELECTROCARDIOGRAM REPORT: CPT

## 2018-01-17 PROCEDURE — 71045 X-RAY EXAM CHEST 1 VIEW: CPT | Mod: 26

## 2018-01-17 RX ORDER — OXYCODONE AND ACETAMINOPHEN 5; 325 MG/1; MG/1
1 TABLET ORAL EVERY 6 HOURS
Qty: 0 | Refills: 0 | Status: DISCONTINUED | OUTPATIENT
Start: 2018-01-17 | End: 2018-01-18

## 2018-01-17 RX ORDER — VANCOMYCIN HCL 1 G
1000 VIAL (EA) INTRAVENOUS ONCE
Qty: 0 | Refills: 0 | Status: COMPLETED | OUTPATIENT
Start: 2018-01-18 | End: 2018-01-18

## 2018-01-17 RX ADMIN — SODIUM CHLORIDE 3 MILLILITER(S): 9 INJECTION INTRAMUSCULAR; INTRAVENOUS; SUBCUTANEOUS at 21:23

## 2018-01-17 RX ADMIN — LOSARTAN POTASSIUM 100 MILLIGRAM(S): 100 TABLET, FILM COATED ORAL at 18:23

## 2018-01-17 RX ADMIN — CARVEDILOL PHOSPHATE 12.5 MILLIGRAM(S): 80 CAPSULE, EXTENDED RELEASE ORAL at 18:23

## 2018-01-17 RX ADMIN — OXYCODONE AND ACETAMINOPHEN 1 TABLET(S): 5; 325 TABLET ORAL at 22:01

## 2018-01-17 RX ADMIN — SIMVASTATIN 40 MILLIGRAM(S): 20 TABLET, FILM COATED ORAL at 21:24

## 2018-01-17 RX ADMIN — SODIUM CHLORIDE 3 MILLILITER(S): 9 INJECTION INTRAMUSCULAR; INTRAVENOUS; SUBCUTANEOUS at 05:20

## 2018-01-17 RX ADMIN — SODIUM CHLORIDE 3 MILLILITER(S): 9 INJECTION INTRAMUSCULAR; INTRAVENOUS; SUBCUTANEOUS at 12:12

## 2018-01-17 RX ADMIN — OXYCODONE AND ACETAMINOPHEN 1 TABLET(S): 5; 325 TABLET ORAL at 22:31

## 2018-01-17 NOTE — PROGRESS NOTE ADULT - SUBJECTIVE AND OBJECTIVE BOX
EP ATTENDING    no palpitations, no syncope, no angina    Tele: NSR, LBBB    acetaminophen   Tablet. 650 milliGRAM(s) Oral once PRN  acetaminophen   Tablet. 650 milliGRAM(s) Oral every 6 hours PRN  amLODIPine   Tablet 5 milliGRAM(s) Oral <User Schedule>  aspirin enteric coated 81 milliGRAM(s) Oral daily  carvedilol 12.5 milliGRAM(s) Oral every 12 hours  clopidogrel Tablet 75 milliGRAM(s) Oral daily  famotidine    Tablet 20 milliGRAM(s) Oral daily  furosemide    Tablet 20 milliGRAM(s) Oral daily  heparin  Injectable 5000 Unit(s) SubCutaneous every 8 hours  isosorbide   mononitrate ER Tablet (IMDUR) 30 milliGRAM(s) Oral <User Schedule>  losartan 100 milliGRAM(s) Oral <User Schedule>  simvastatin 40 milliGRAM(s) Oral at bedtime  sodium chloride 0.9% lock flush 3 milliLiter(s) IV Push every 8 hours                            14.5   6.15  )-----------( 181      ( 17 Jan 2018 07:00 )             43.3       01-17    136  |  99  |  20  ----------------------------<  84  4.4   |  24  |  1.06    Ca    9.2      17 Jan 2018 07:00  Mg     1.9     01-17        T(C): 36.7 (01-17-18 @ 05:18), Max: 36.7 (01-16-18 @ 14:36)  HR: 60 (01-17-18 @ 05:18) (60 - 77)  BP: 139/81 (01-17-18 @ 05:18) (109/63 - 139/81)  RR: 18 (01-17-18 @ 05:18) (16 - 18)  SpO2: 99% (01-17-18 @ 05:18) (98% - 100%)  Wt(kg): --    no JVD  RRR, no murmurs  CTAB  soft nt/nd  no c/c/e      A/P) He is a pleasant 56 y/o male with a long standing history of CAD and LBBB who is now admitted with presyncope. A repeat cath showed patent stents and an echo showed severe LV dysfunction (35%). He has been on optimal medical therapy with coreg and losartan for greater than 9 months. In addition he has NYHA Class III symptoms of CHF with dyspnea on exertion and orthopnea. His signs and symptoms of presyncope are concerning for a ventricular arrhythmia or even transient AV block given existing LBBB. He denies syncope.    -given the above I recommended a BiV-ICD. I cited a 3% risk of bleeding or infection, and a 1% risk of major complication including but not limited to heart attack, stroke, death or need for emergency open heart surgery or pericardiocentesis.   -understanding his R/B/A he elects BiV-ICD implantation  -keep NPO  -BiV-ICD today   -expect d/c home tomorrow  -f/u with [facility] on Tuesday January 23rd at 9:15 AM  -f/u with me for wound check on Thursday February 1st at 2:40 PM

## 2018-01-17 NOTE — PROGRESS NOTE ADULT - ATTENDING COMMENTS
Agree with above.   -BiVAICD per eps    Sophie Pollard MD  [facility]  [address], [address]  [place] [address]  office: [phone]  pager: [phone]

## 2018-01-17 NOTE — PROGRESS NOTE ADULT - SUBJECTIVE AND OBJECTIVE BOX
Patient is a 57y old  Male who presents with a chief complaint of Dizziness (10 Erick 2018 19:56)  NAD    INTERVAL HPI/OVERNIGHT EVENTS:  T(C): 36.8 (01-17-18 @ 12:29), Max: 36.8 (01-17-18 @ 12:29)  HR: 61 (01-17-18 @ 12:29) (60 - 77)  BP: 132/81 (01-17-18 @ 12:29) (109/63 - 139/81)  RR: 17 (01-17-18 @ 12:29) (16 - 18)  SpO2: 98% (01-17-18 @ 12:29) (98% - 99%)  Wt(kg): --  I&O's Summary      LABS:                        14.5   6.15  )-----------( 181      ( 17 Jan 2018 07:00 )             43.3     01-17    136  |  99  |  20  ----------------------------<  84  4.4   |  24  |  1.06    Ca    9.2      17 Jan 2018 07:00  Mg     1.9     01-17          CAPILLARY BLOOD GLUCOSE                MEDICATIONS  (STANDING):  amLODIPine   Tablet 5 milliGRAM(s) Oral <User Schedule>  aspirin enteric coated 81 milliGRAM(s) Oral daily  carvedilol 12.5 milliGRAM(s) Oral every 12 hours  clopidogrel Tablet 75 milliGRAM(s) Oral daily  famotidine    Tablet 20 milliGRAM(s) Oral daily  furosemide    Tablet 20 milliGRAM(s) Oral daily  heparin  Injectable 5000 Unit(s) SubCutaneous every 8 hours  isosorbide   mononitrate ER Tablet (IMDUR) 30 milliGRAM(s) Oral <User Schedule>  losartan 100 milliGRAM(s) Oral <User Schedule>  simvastatin 40 milliGRAM(s) Oral at bedtime  sodium chloride 0.9% lock flush 3 milliLiter(s) IV Push every 8 hours    MEDICATIONS  (PRN):  acetaminophen   Tablet. 650 milliGRAM(s) Oral every 6 hours PRN headache  acetaminophen   Tablet. 650 milliGRAM(s) Oral once PRN Mild/moderate/severe pain          PHYSICAL EXAM:  GENERAL: frail  CHEST/LUNG: Clear to percussion bilaterally; No rales, rhonchi, wheezing, or rubs  HEART: Regular rate and rhythm; No murmurs, rubs, or gallops  ABDOMEN: Soft, Nontender, Nondistended; Bowel sounds present  EXTREMITIES:  2+ Peripheral Pulses, No clubbing, cyanosis, or edema  LYMPH: No lymphadenopathy noted  SKIN: No rashes or lesions    Care Discussed with Consultants/Other Providers [+ ] YES  [ ] NO

## 2018-01-17 NOTE — PROGRESS NOTE ADULT - SUBJECTIVE AND OBJECTIVE BOX
EP ADDENDUM    Successful BiV-ICD implant  No acute complications    Plan  -cxr, ekg  -vanco at 2 am  -d/c home in AM  -f/u with Eros on Tuesday January 23rd at 9:15 AM  -f/u with me for wound check on Thursday February 1st at 2:40 PM

## 2018-01-17 NOTE — PROGRESS NOTE ADULT - SUBJECTIVE AND OBJECTIVE BOX
SUBJECTIVE: no CP or SOB    MEDICATIONS  (STANDING):  amLODIPine   Tablet 5 milliGRAM(s) Oral <User Schedule>  aspirin enteric coated 81 milliGRAM(s) Oral daily  carvedilol 12.5 milliGRAM(s) Oral every 12 hours  clopidogrel Tablet 75 milliGRAM(s) Oral daily  famotidine    Tablet 20 milliGRAM(s) Oral daily  furosemide    Tablet 20 milliGRAM(s) Oral daily  heparin  Injectable 5000 Unit(s) SubCutaneous every 8 hours  isosorbide   mononitrate ER Tablet (IMDUR) 30 milliGRAM(s) Oral <User Schedule>  losartan 100 milliGRAM(s) Oral <User Schedule>  simvastatin 40 milliGRAM(s) Oral at bedtime  sodium chloride 0.9% lock flush 3 milliLiter(s) IV Push every 8 hours    LABS:                        14.5   6.15  )-----------( 181      ( 17 Jan 2018 07:00 )             43.3     136  |  99  |  20  ----------------------------<  84  4.4   |  24  |  1.06    Ca    9.2      17 Jan 2018 07:00  Mg     1.9     01-17    PHYSICAL EXAM  Vital Signs Last 24 Hrs  T(C): 36.7 (17 Jan 2018 05:18), Max: 36.7 (16 Jan 2018 14:36)  T(F): 98.1 (17 Jan 2018 05:18), Max: 98.1 (17 Jan 2018 05:18)  HR: 60 (17 Jan 2018 05:18) (60 - 77)  BP: 139/81 (17 Jan 2018 05:18) (109/63 - 139/81)  RR: 18 (17 Jan 2018 05:18) (16 - 18)  SpO2: 99% (17 Jan 2018 05:18) (98% - 100%)    Cardiovascular:  S1S2 RRR, No JVD, no m/r/g  Respiratory: Lungs clear to auscultation, normal effort  Gastrointestinal: Abdomen soft, ND, NT, +BS  Ext: No C/C/E BL LE  R radial site soft non tender no sign of active bleed or hematoma     DIAGNOSTIC DATA    TELEMETRY: NSR    < from: CT Head No Cont (01.10.18 @ 17:17) >    IMPRESSION:    Normal non-contrast CT of the brain.    < end of copied text >    < from: Cardiac Cath Lab (01.31.14 @ 13:55) >  VENTRICLES: There were no left ventricular global or regional wall motion  abnormalities.  CORONARY VESSELS: The coronary circulation is right dominant.  LM:   --  LM: Normal.  LAD:   --  LAD: Normal.  --  D1: Normal.  CX:   --  Proximal circumflex: Normal. There was no significant restenosis.  --  Mid circumflex: Normal.  --  Distal circumflex: There was a tubular 100 % stenosis.  --  OM1: There was a 100 % stenosis. It is stent jailed.  --  OM2: Normal. There was no significant restenosis.  RI:   --  Ramus intermedius: Normal. There was no significant restenosis.  RCA:   --  RCA: The vessel was very large sized (dominant).  --  Proximal RCA: Normal.  --  Mid RCA: There was a discrete 40 % stenosis.  --  Distal RCA: Normal.  --  RPDA: Normal.  --  RPL1: Normal.  COMPLICATIONS: No complications occurred during the cath lab visit.  DIAGNOSTIC IMPRESSIONS: Widely patent stents in proximal LCx, OM2 and Ramus  intermedius. 100% distal LCx chronic total occlusion.  DIAGNOSTIC RECOMMENDATIONS: S/p successful PTCA/Stent of 100% distal LCx  chronic total occlusion to 0% residual using DRE.  INTERVENTIONAL IMPRESSIONS: Widely patent stents in proximal LCx, OM2 and  Ramus intermedius. 100% distal LCx chronic total occlusion.  INTERVENTIONAL RECOMMENDATIONS: S/p successful PTCA/Stent of 100% distal  LCx chronic total occlusion to 0% residual using DRE. Patient management  should include aggressive medical therapy and an exercise program. The  patient should follow a low fat diet.  Prepared and signed by  Damián Bhatti M.D.  Signed 01/31/2014 15:41:09    < end of copied text >      < from: TTE with Doppler (w/Cont) (01.11.18 @ 14:33) >  EF 35%   CONCLUSIONS:  1. Mitral annular calcification, otherwise normal mitral  valve. Minimal mitral regurgitation.  2. Moderate left ventricular enlargement.  3. Endocardium not well visualized; grossly moderate to  severe global left ventricular systolic dysfunction.  Endocardial visualization enhanced with intravenous  injection of echo contrast (Definity).  No LV thrombus  seen.  4. Normal right ventricular size and function.  < end of copied text >    < from: Cardiac Cath Lab - Adult (01.12.18 @ 13:38) >  VENTRICLES: No left ventriculogram was performed.  CORONARY VESSELS: The coronary circulation is right dominant.  LM:   --  LM: Normal.  LAD:   --  LAD: Angiography showed mild atherosclerosis with no flow  limiting lesions.  --  D1: There was a tubular 20 % stenosis at the site of a prior stent. The  lesion was eccentric. There was TIMIgrade 3 flow through the vessel  (brisk flow).  CX:   --  Circumflex: Angiography showed mild atherosclerosis with no flow  limiting lesions. There was a tubular 10 % stenosis at the site of a prior  stent. The lesion was eccentric. There was DANIEL grade 3 flow through the  vessel (brisk flow).  --  Mid circumflex: There was a diffuse 20 % stenosis at the site of a  prior stent. The lesion was eccentric. There was DANIEL grade 3 flow through  the vessel (brisk flow).  --  OM1: There was a tubular 20% stenosis at the site of a prior stent.  The lesion was smoothly contoured. There was DANIEL grade 3 flow through the  vessel (brisk flow).  RCA:   --  RCA: Angiography showed mild atherosclerosis with no flow  limiting lesions.  COMPLICATIONS: There were no complications.  DIAGNOSTIC IMPRESSIONS: Patent stents Diagonal and LCx  Non-obstructive CAD  < end of copied text >    ASSESSMENT AND PLAN:  58 y/o M with hx of HTN, HLD, CAD s/p mult PCI, last DRE to  of dLCx in Jan 2014, presents with dizziness x 2-3 days, HUBBARD and B/L LE and UE weakness associated with the dizziness.  Repeat Cath as above with Patent stents.      -BiV AICD today  -Cont med Management of Ischemic CM  -Will need close OP Cardio/EP f/u.  -f/u with Dr Cooney on Tuesday January 23rd at 9:15 AM  -f/u with Dr Henao for wound check on Thursday February 1st at 2:40 PM    Cynthia Tyler PA-C  [facility]  [address], [address]   [place] [address]  office [phone]  pager [phone]

## 2018-01-18 ENCOUNTER — TRANSCRIPTION ENCOUNTER (OUTPATIENT)
Age: 58
End: 2018-01-18

## 2018-01-18 VITALS
OXYGEN SATURATION: 100 % | TEMPERATURE: 98 F | DIASTOLIC BLOOD PRESSURE: 80 MMHG | SYSTOLIC BLOOD PRESSURE: 129 MMHG | HEART RATE: 69 BPM | RESPIRATION RATE: 18 BRPM

## 2018-01-18 LAB
APTT BLD: 38.4 SEC — HIGH (ref 27.5–37.4)
BUN SERPL-MCNC: 19 MG/DL — SIGNIFICANT CHANGE UP (ref 7–23)
CALCIUM SERPL-MCNC: 9 MG/DL — SIGNIFICANT CHANGE UP (ref 8.4–10.5)
CHLORIDE SERPL-SCNC: 97 MMOL/L — LOW (ref 98–107)
CO2 SERPL-SCNC: 25 MMOL/L — SIGNIFICANT CHANGE UP (ref 22–31)
CREAT SERPL-MCNC: 1 MG/DL — SIGNIFICANT CHANGE UP (ref 0.5–1.3)
GLUCOSE SERPL-MCNC: 104 MG/DL — HIGH (ref 70–99)
HCT VFR BLD CALC: 43.4 % — SIGNIFICANT CHANGE UP (ref 39–50)
HCT VFR BLD CALC: 47 % — SIGNIFICANT CHANGE UP (ref 39–50)
HGB BLD-MCNC: 14.8 G/DL — SIGNIFICANT CHANGE UP (ref 13–17)
HGB BLD-MCNC: 15.3 G/DL — SIGNIFICANT CHANGE UP (ref 13–17)
INR BLD: 1.18 — HIGH (ref 0.88–1.17)
MAGNESIUM SERPL-MCNC: 2 MG/DL — SIGNIFICANT CHANGE UP (ref 1.6–2.6)
MCHC RBC-ENTMCNC: 28.1 PG — SIGNIFICANT CHANGE UP (ref 27–34)
MCHC RBC-ENTMCNC: 29.2 PG — SIGNIFICANT CHANGE UP (ref 27–34)
MCHC RBC-ENTMCNC: 32.6 % — SIGNIFICANT CHANGE UP (ref 32–36)
MCHC RBC-ENTMCNC: 34.1 % — SIGNIFICANT CHANGE UP (ref 32–36)
MCV RBC AUTO: 85.8 FL — SIGNIFICANT CHANGE UP (ref 80–100)
MCV RBC AUTO: 86.4 FL — SIGNIFICANT CHANGE UP (ref 80–100)
NRBC # FLD: 0 — SIGNIFICANT CHANGE UP
NRBC # FLD: 0 — SIGNIFICANT CHANGE UP
PLATELET # BLD AUTO: 179 K/UL — SIGNIFICANT CHANGE UP (ref 150–400)
PLATELET # BLD AUTO: 206 K/UL — SIGNIFICANT CHANGE UP (ref 150–400)
PMV BLD: 12.2 FL — SIGNIFICANT CHANGE UP (ref 7–13)
PMV BLD: 12.5 FL — SIGNIFICANT CHANGE UP (ref 7–13)
POTASSIUM SERPL-MCNC: 4.7 MMOL/L — SIGNIFICANT CHANGE UP (ref 3.5–5.3)
POTASSIUM SERPL-SCNC: 4.7 MMOL/L — SIGNIFICANT CHANGE UP (ref 3.5–5.3)
PROTHROM AB SERPL-ACNC: 13.1 SEC — SIGNIFICANT CHANGE UP (ref 9.8–13.1)
RBC # BLD: 5.06 M/UL — SIGNIFICANT CHANGE UP (ref 4.2–5.8)
RBC # BLD: 5.44 M/UL — SIGNIFICANT CHANGE UP (ref 4.2–5.8)
RBC # FLD: 12.6 % — SIGNIFICANT CHANGE UP (ref 10.3–14.5)
RBC # FLD: 12.7 % — SIGNIFICANT CHANGE UP (ref 10.3–14.5)
SODIUM SERPL-SCNC: 134 MMOL/L — LOW (ref 135–145)
WBC # BLD: 8.99 K/UL — SIGNIFICANT CHANGE UP (ref 3.8–10.5)
WBC # BLD: 9.3 K/UL — SIGNIFICANT CHANGE UP (ref 3.8–10.5)
WBC # FLD AUTO: 8.99 K/UL — SIGNIFICANT CHANGE UP (ref 3.8–10.5)
WBC # FLD AUTO: 9.3 K/UL — SIGNIFICANT CHANGE UP (ref 3.8–10.5)

## 2018-01-18 RX ORDER — ACETAMINOPHEN 500 MG
2 TABLET ORAL
Qty: 0 | Refills: 0 | DISCHARGE
Start: 2018-01-18

## 2018-01-18 RX ORDER — OXYCODONE AND ACETAMINOPHEN 5; 325 MG/1; MG/1
1 TABLET ORAL EVERY 4 HOURS
Qty: 0 | Refills: 0 | Status: DISCONTINUED | OUTPATIENT
Start: 2018-01-18 | End: 2018-01-18

## 2018-01-18 RX ADMIN — FAMOTIDINE 20 MILLIGRAM(S): 10 INJECTION INTRAVENOUS at 13:38

## 2018-01-18 RX ADMIN — OXYCODONE AND ACETAMINOPHEN 1 TABLET(S): 5; 325 TABLET ORAL at 10:37

## 2018-01-18 RX ADMIN — Medication 81 MILLIGRAM(S): at 13:38

## 2018-01-18 RX ADMIN — OXYCODONE AND ACETAMINOPHEN 1 TABLET(S): 5; 325 TABLET ORAL at 11:10

## 2018-01-18 RX ADMIN — Medication 250 MILLIGRAM(S): at 02:47

## 2018-01-18 RX ADMIN — AMLODIPINE BESYLATE 5 MILLIGRAM(S): 2.5 TABLET ORAL at 13:38

## 2018-01-18 RX ADMIN — Medication 20 MILLIGRAM(S): at 06:48

## 2018-01-18 RX ADMIN — OXYCODONE AND ACETAMINOPHEN 1 TABLET(S): 5; 325 TABLET ORAL at 03:43

## 2018-01-18 RX ADMIN — CLOPIDOGREL BISULFATE 75 MILLIGRAM(S): 75 TABLET, FILM COATED ORAL at 13:38

## 2018-01-18 RX ADMIN — ISOSORBIDE MONONITRATE 30 MILLIGRAM(S): 60 TABLET, EXTENDED RELEASE ORAL at 13:38

## 2018-01-18 RX ADMIN — OXYCODONE AND ACETAMINOPHEN 1 TABLET(S): 5; 325 TABLET ORAL at 03:13

## 2018-01-18 RX ADMIN — SODIUM CHLORIDE 3 MILLILITER(S): 9 INJECTION INTRAMUSCULAR; INTRAVENOUS; SUBCUTANEOUS at 06:47

## 2018-01-18 RX ADMIN — CARVEDILOL PHOSPHATE 12.5 MILLIGRAM(S): 80 CAPSULE, EXTENDED RELEASE ORAL at 06:48

## 2018-01-18 NOTE — PROGRESS NOTE ADULT - SUBJECTIVE AND OBJECTIVE BOX
+ swelling at the site of AICD placement, pt is resting comfortably after Percocet was given;  >> area is mildly tender around the device, hematoma present laterally to the device;  >> discussed with Frankie CCU NP>> area of swelling and hematoma was marked >> continue monitoring.  >> CBC sent -- pending, + swelling at the site of AICD placement, pt is resting comfortably after Percocet was given;  >> area is mildly tender around the device, hematoma present laterally to the device;  >> discussed with DAVE David NP>> area of swelling and hematoma was marked >> continue monitoring.  >> CBC sent -- pending,   >> ECG-- V paced, CXR-- prelim-- no PMX,

## 2018-01-18 NOTE — PROGRESS NOTE ADULT - ATTENDING COMMENTS
Patient seen and examined this am.  Agree with above.   -dc planning    Sophie Pollard MD  [facility]  [address], [address]  [place] [address]  office: [phone]  pager: [phone]

## 2018-01-18 NOTE — DISCHARGE NOTE ADULT - HOSPITAL COURSE
58 y/o M with hx of HTN, CAD s/p proximal LCx, Distal LCx,  OM2, ramus intermedius, and diagonal stents, HLD presents with dizziness x 2-3 days. Patient states dizziness is intermittent, and random onset. He cannot recall what exacerbates or relieves the dizziness. He also noticed that he has SOB with exertion with 1 flight of stairs or walking up to 1 block x 2-3 weeks.     TTE with severe LV dysfunction. Coronary angiogram showed non-obstructive CAD. 56 y/o M with hx of HTN, CAD s/p proximal LCx, Distal LCx,  OM2, ramus intermedius, and diagonal stents, HLD presents with dizziness x 2-3 days. Patient states dizziness is intermittent, and random onset. He cannot recall what exacerbates or relieves the dizziness. He also noticed that he has SOB with exertion with 1 flight of stairs or walking up to 1 block x 2-3 weeks.     TTE with severe LV dysfunction. Coronary angiogram showed non-obstructive CAD. Pt  has been on optimal medical therapy with coreg and losartan for greater than 9 months. On 1/17 pt underwent AICD placement. Pt was monitored overnight; some bruising at the site, which should get better with time. Pt is now cleared for discharge home with f/u appointments.

## 2018-01-18 NOTE — DISCHARGE NOTE ADULT - MEDICATION SUMMARY - MEDICATIONS TO TAKE
I will START or STAY ON the medications listed below when I get home from the hospital:    aspirin 81 mg oral delayed release tablet  -- 1 tab(s) by mouth once a day  -- Indication: For CAD (Coronary Artery Disease)    acetaminophen 325 mg oral tablet  -- 2 tab(s) by mouth every 6 hours, As needed, pain    -- Indication: For pain    losartan 100 mg oral tablet  -- 1 tab(s) by mouth once a day  -- Indication: For HTN (Hypertension)    isosorbide mononitrate 30 mg oral tablet, extended release  -- 1 tab(s) by mouth once a day (in the morning)  -- Indication: For HTN (Hypertension)    simvastatin 40 mg oral tablet  -- 1 tab(s) by mouth once a day (at bedtime)  -- Indication: For HLD    clopidogrel 75 mg oral tablet  -- 1 tab(s) by mouth once a day  -- Indication: For CAD (Coronary Artery Disease)    Coreg 12.5 mg oral tablet  -- 1 tab(s) by mouth 2 times a day  -- Indication: For HF    amLODIPine 5 mg oral tablet  -- 1 tab(s) by mouth once a day  -- Indication: For HTN (Hypertension)    Lasix 20 mg oral tablet  -- 1 tab(s) by mouth once a day  -- Indication: For HF    famotidine 20 mg oral tablet  -- 1 tab(s) by mouth once a day  -- Indication: For acid reflux

## 2018-01-18 NOTE — PROGRESS NOTE ADULT - PROVIDER SPECIALTY LIST ADULT
Anesthesia
Cardiology
Electrophysiology
Hospitalist
Neurology
Neurology
Cardiology
Cardiology
Hospitalist
Hospitalist

## 2018-01-18 NOTE — DISCHARGE NOTE ADULT - CARE PROVIDER_API CALL
Rayne Henao), Cardiac Electrophysiology; Cardiovascular Disease; Internal Medicine; Nuclear Cardiology  [address] [facility] [address]  [address]  [place] [address]  Phone: [phone]  Fax: [phone]    Briseida Bishop), Cardiovascular Disease; Internal Medicine  [address] [facility] [address] [facility]  [address]  [place] [address]  Phone: [phone]  Fax: [phone]

## 2018-01-18 NOTE — DISCHARGE NOTE ADULT - PLAN OF CARE
AICD placement You have a follow up appointement with Dr. Cooney on Tuesday January 23rd at 9:15 AM  You have a follow up appointment  with Dr. Henao  for wound check on Thursday February 1st at 2:40 Please follow wound care instructions you got .You have a follow up appointement with Dr. Cooney on Tuesday January 23rd at 9:15 AM  You have a follow up appointment  with Dr. Henao  for wound check on Thursday February 1st at 2:40

## 2018-01-18 NOTE — PROGRESS NOTE ADULT - ASSESSMENT
56 y/o M with hx of HTN, CAD s/p proximal LCx, Distal LCx,  OM2, ramus intermedius, and diagonal stents, HLD presents with dizziness x 2-3 days. admitted for near syncope / r/o ACS    Problem/Plan - 1:  ·  Problem: Near syncope.  Plan: TELEMONITOR  Cards and neuro fu  fall precautions  neuro checks  ischemia barragan as per cards  sp cath    Problem/Plan - 2:  ·  Problem: Shortness of breath.  Plan: cont asa, plavix.   cards fu  ischemia barragan as per cards    Problem/Plan - 3:  ·  Problem: Hyperlipidemia.  Plan: cont statin.     Problem/Plan - 4:  ·  Problem: CAD (Coronary Artery Disease).  Plan: cont asa, plavix, statin, isosorbide.     Problem/Plan - 5:  ·  Problem: HTN (Hypertension).  Plan: cont home meds
Problem/Plan - 1:  ·  Problem: Near syncope.  Plan: TELEMONITOR  Cards and neuro fu  sp cath  sp AICD     Problem/Plan - 2:  ·  Problem: Shortness of breath.  Plan: cont asa, plavix.   cards fu    Problem/Plan - 3:  ·  Problem: Hyperlipidemia.  Plan: cont statin.     Problem/Plan - 4:  ·  Problem: CAD (Coronary Artery Disease).  Plan: cont asa, plavix, statin, isosorbide.     dc planning
Problem/Plan - 1:  ·  Problem: Near syncope.  Plan: TELEMONITOR  Cards and neuro fu  fall precautions  neuro checks  ischemia barragan as per cards  sp cath  AICD wednesday   Problem/Plan - 2:  ·  Problem: Shortness of breath.  Plan: cont asa, plavix.   cards fu      Problem/Plan - 3:  ·  Problem: Hyperlipidemia.  Plan: cont statin.     Problem/Plan - 4:  ·  Problem: CAD (Coronary Artery Disease).  Plan: cont asa, plavix, statin, isosorbide.
Problem/Plan - 1:  ·  Problem: Near syncope.  Plan: TELEMONITOR  Cards and neuro fu  fall precautions  neuro checks  ischemia barragan as per cards  sp cath  device wednesday    Problem/Plan - 2:  ·  Problem: Shortness of breath.  Plan: cont asa, plavix.   cards fu      Problem/Plan - 3:  ·  Problem: Hyperlipidemia.  Plan: cont statin.     Problem/Plan - 4:  ·  Problem: CAD (Coronary Artery Disease).  Plan: cont asa, plavix, statin, isosorbide.     Problem/Plan - 5:  ·  Problem: HTN (Hypertension).  Plan: cont home meds
Problem/Plan - 1:  ·  Problem: Near syncope.  Plan: TELEMONITOR  Cards and neuro fu  sp cath  AICD today    Problem/Plan - 2:  ·  Problem: Shortness of breath.  Plan: cont asa, plavix.   cards fu    Problem/Plan - 3:  ·  Problem: Hyperlipidemia.  Plan: cont statin.     Problem/Plan - 4:  ·  Problem: CAD (Coronary Artery Disease).  Plan: cont asa, plavix, statin, isosorbide.
Problem/Plan - 1:  ·  Problem: Near syncope.  Plan: TELEMONITOR  Cards and neuro fu  sp cath  AICD tomorrow    Problem/Plan - 2:  ·  Problem: Shortness of breath.  Plan: cont asa, plavix.   cards fu    Problem/Plan - 3:  ·  Problem: Hyperlipidemia.  Plan: cont statin.     Problem/Plan - 4:  ·  Problem: CAD (Coronary Artery Disease).  Plan: cont asa, plavix, statin, isosorbide.

## 2018-01-18 NOTE — DISCHARGE NOTE ADULT - CARE PLAN
Principal Discharge DX:	Left ventricular ejection fraction less than 40%  Goal:	AICD placement  Assessment and plan of treatment:	You have a follow up appointement with Dr. Cooney on Tuesday January 23rd at 9:15 AM  You have a follow up appointment  with Dr. Henao  for wound check on Thursday February 1st at 2:40 Principal Discharge DX:	Left ventricular ejection fraction less than 40%  Goal:	AICD placement  Assessment and plan of treatment:	Please follow wound care instructions you got .You have a follow up appointement with Dr. Cooney on Tuesday January 23rd at 9:15 AM  You have a follow up appointment  with Dr. Henao  for wound check on Thursday February 1st at 2:40

## 2018-01-18 NOTE — PROGRESS NOTE ADULT - SUBJECTIVE AND OBJECTIVE BOX
SUBJECTIVE: no CP or SOB    MEDICATIONS  (STANDING):  amLODIPine   Tablet 5 milliGRAM(s) Oral <User Schedule>  aspirin enteric coated 81 milliGRAM(s) Oral daily  carvedilol 12.5 milliGRAM(s) Oral every 12 hours  clopidogrel Tablet 75 milliGRAM(s) Oral daily  famotidine    Tablet 20 milliGRAM(s) Oral daily  furosemide    Tablet 20 milliGRAM(s) Oral daily  isosorbide   mononitrate ER Tablet (IMDUR) 30 milliGRAM(s) Oral <User Schedule>  losartan 100 milliGRAM(s) Oral <User Schedule>  simvastatin 40 milliGRAM(s) Oral at bedtime  sodium chloride 0.9% lock flush 3 milliLiter(s) IV Push every 8 hours    LABS:                        15.3   9.30  )-----------( 206      ( 18 Jan 2018 07:50 )             47.0    134<L>  |  97<L>  |  19  ----------------------------<  104<H>  4.7   |  25  |  1.00    Ca    9.0      18 Jan 2018 01:08  Mg     2.0     01-18    Creatinine Trend: 1.00<--, 1.06<--, 1.14<--, 1.11<--, 1.25<--, 1.55<--   PT/INR - ( 18 Jan 2018 01:08 )   PT: 13.1 SEC;   INR: 1.18     PTT - ( 18 Jan 2018 01:08 )  PTT:38.4 SEC    PHYSICAL EXAM  Vital Signs Last 24 Hrs  T(C): 36.6 (18 Jan 2018 13:37), Max: 36.7 (17 Jan 2018 18:00)  T(F): 97.9 (18 Jan 2018 13:37), Max: 98.1 (17 Jan 2018 18:00)  HR: 69 (18 Jan 2018 13:37) (60 - 72)  BP: 129/80 (18 Jan 2018 13:37) (113/71 - 129/82)  RR: 18 (18 Jan 2018 13:37) (16 - 18)  SpO2: 100% (18 Jan 2018 13:37) (99% - 100%)    Cardiovascular:  S1S2 RRR, No JVD, no m/r/g  Respiratory: Lungs clear to auscultation, normal effort  Gastrointestinal: Abdomen soft, ND, NT, +BS  Ext: No C/C/E BL LE  R radial site soft non tender no sign of active bleed or hematoma     DIAGNOSTIC DATA    TELEMETRY: NSR    < from: CT Head No Cont (01.10.18 @ 17:17) >    IMPRESSION:    Normal non-contrast CT of the brain.    < end of copied text >    < from: Cardiac Cath Lab (01.31.14 @ 13:55) >  VENTRICLES: There were no left ventricular global or regional wall motion  abnormalities.  CORONARY VESSELS: The coronary circulation is right dominant.  LM:   --  LM: Normal.  LAD:   --  LAD: Normal.  --  D1: Normal.  CX:   --  Proximal circumflex: Normal. There was no significant restenosis.  --  Mid circumflex: Normal.  --  Distal circumflex: There was a tubular 100 % stenosis.  --  OM1: There was a 100 % stenosis. It is stent jailed.  --  OM2: Normal. There was no significant restenosis.  RI:   --  Ramus intermedius: Normal. There was no significant restenosis.  RCA:   --  RCA: The vessel was very large sized (dominant).  --  Proximal RCA: Normal.  --  Mid RCA: There was a discrete 40 % stenosis.  --  Distal RCA: Normal.  --  RPDA: Normal.  --  RPL1: Normal.  COMPLICATIONS: No complications occurred during the cath lab visit.  DIAGNOSTIC IMPRESSIONS: Widely patent stents in proximal LCx, OM2 and Ramus  intermedius. 100% distal LCx chronic total occlusion.  DIAGNOSTIC RECOMMENDATIONS: S/p successful PTCA/Stent of 100% distal LCx  chronic total occlusion to 0% residual using DRE.  INTERVENTIONAL IMPRESSIONS: Widely patent stents in proximal LCx, OM2 and  Ramus intermedius. 100% distal LCx chronic total occlusion.  INTERVENTIONAL RECOMMENDATIONS: S/p successful PTCA/Stent of 100% distal  LCx chronic total occlusion to 0% residual using DRE. Patient management  should include aggressive medical therapy and an exercise program. The  patient should follow a low fat diet.  Prepared and signed by  Damián Bhatti M.D.  Signed 01/31/2014 15:41:09    < end of copied text >      < from: TTE with Doppler (w/Cont) (01.11.18 @ 14:33) >  EF 35%   CONCLUSIONS:  1. Mitral annular calcification, otherwise normal mitral  valve. Minimal mitral regurgitation.  2. Moderate left ventricular enlargement.  3. Endocardium not well visualized; grossly moderate to  severe global left ventricular systolic dysfunction.  Endocardial visualization enhanced with intravenous  injection of echo contrast (Definity).  No LV thrombus  seen.  4. Normal right ventricular size and function.  < end of copied text >    < from: Cardiac Cath Lab - Adult (01.12.18 @ 13:38) >  VENTRICLES: No left ventriculogram was performed.  CORONARY VESSELS: The coronary circulation is right dominant.  LM:   --  LM: Normal.  LAD:   --  LAD: Angiography showed mild atherosclerosis with no flow  limiting lesions.  --  D1: There was a tubular 20 % stenosis at the site of a prior stent. The  lesion was eccentric. There was TIMIgrade 3 flow through the vessel  (brisk flow).  CX:   --  Circumflex: Angiography showed mild atherosclerosis with no flow  limiting lesions. There was a tubular 10 % stenosis at the site of a prior  stent. The lesion was eccentric. There was DANIEL grade 3 flow through the  vessel (brisk flow).  --  Mid circumflex: There was a diffuse 20 % stenosis at the site of a  prior stent. The lesion was eccentric. There was DANIEL grade 3 flow through  the vessel (brisk flow).  --  OM1: There was a tubular 20% stenosis at the site of a prior stent.  The lesion was smoothly contoured. There was DANIEL grade 3 flow through the  vessel (brisk flow).  RCA:   --  RCA: Angiography showed mild atherosclerosis with no flow  limiting lesions.  COMPLICATIONS: There were no complications.  DIAGNOSTIC IMPRESSIONS: Patent stents Diagonal and LCx  Non-obstructive CAD  < end of copied text >    ASSESSMENT AND PLAN:  56 y/o M with hx of HTN, HLD, CAD s/p mult PCI, last DRE to  of dLCx in Jan 2014, presents with dizziness x 2-3 days, HUBBARD and B/L LE and UE weakness associated with the dizziness.  Repeat Cath as above with Patent stents.      -s/p BiV AICD  -Cont med Management of Ischemic CM  -Will need close OP Cardio/EP f/u.  -f/u with Dr Cooney on Tuesday January 23rd at 9:15 AM  -f/u with Dr Henao for wound check on Thursday February 1st at 2:40 PM    Cynthia Tyler PA-C  [facility]  [address], [address]   [place] [address]  office [phone]  pager [phone]

## 2018-01-18 NOTE — PROGRESS NOTE ADULT - SUBJECTIVE AND OBJECTIVE BOX
ANESTHESIA POSTOP CHECK    57y Male POSTOP DAY 1 S/P ICD implant    Vital Signs Last 24 Hrs  T(C): 36.7 (18 Jan 2018 06:46), Max: 36.8 (17 Jan 2018 12:29)  T(F): 98 (18 Jan 2018 06:46), Max: 98.3 (17 Jan 2018 12:29)  HR: 60 (18 Jan 2018 06:46) (60 - 72)  BP: 129/82 (18 Jan 2018 06:46) (113/71 - 132/81)  BP(mean): --  RR: 18 (18 Jan 2018 06:46) (16 - 18)  SpO2: 100% (18 Jan 2018 06:46) (98% - 100%)  I&O's Summary      [x ] NO APPARENT ANESTHESIA COMPLICATIONS      Comments:

## 2018-01-18 NOTE — PROGRESS NOTE ADULT - SUBJECTIVE AND OBJECTIVE BOX
Patient is a 57y old  Male who presents with a chief complaint of Dizziness (10 Erick 2018 19:56)  pt seen earlier during the day, NAD      INTERVAL HPI/OVERNIGHT EVENTS:  T(C): 36.6 (01-18-18 @ 13:37), Max: 36.7 (01-17-18 @ 18:00)  HR: 69 (01-18-18 @ 13:37) (60 - 72)  BP: 129/80 (01-18-18 @ 13:37) (113/71 - 129/82)  RR: 18 (01-18-18 @ 13:37) (16 - 18)  SpO2: 100% (01-18-18 @ 13:37) (99% - 100%)  Wt(kg): --  I&O's Summary      LABS:                        15.3   9.30  )-----------( 206      ( 18 Jan 2018 07:50 )             47.0     01-18    134<L>  |  97<L>  |  19  ----------------------------<  104<H>  4.7   |  25  |  1.00    Ca    9.0      18 Jan 2018 01:08  Mg     2.0     01-18      PT/INR - ( 18 Jan 2018 01:08 )   PT: 13.1 SEC;   INR: 1.18          PTT - ( 18 Jan 2018 01:08 )  PTT:38.4 SEC    CAPILLARY BLOOD GLUCOSE                MEDICATIONS  (STANDING):  amLODIPine   Tablet 5 milliGRAM(s) Oral <User Schedule>  aspirin enteric coated 81 milliGRAM(s) Oral daily  carvedilol 12.5 milliGRAM(s) Oral every 12 hours  clopidogrel Tablet 75 milliGRAM(s) Oral daily  famotidine    Tablet 20 milliGRAM(s) Oral daily  furosemide    Tablet 20 milliGRAM(s) Oral daily  isosorbide   mononitrate ER Tablet (IMDUR) 30 milliGRAM(s) Oral <User Schedule>  losartan 100 milliGRAM(s) Oral <User Schedule>  simvastatin 40 milliGRAM(s) Oral at bedtime  sodium chloride 0.9% lock flush 3 milliLiter(s) IV Push every 8 hours    MEDICATIONS  (PRN):  acetaminophen   Tablet. 650 milliGRAM(s) Oral every 6 hours PRN headache  acetaminophen   Tablet. 650 milliGRAM(s) Oral once PRN Mild/moderate/severe pain  oxyCODONE    5 mG/acetaminophen 325 mG 1 Tablet(s) Oral every 4 hours PRN Severe Pain (7 - 10)          PHYSICAL EXAM:  GENERAL: NAD, well-groomed, well-developed  HEAD:  Atraumatic, Normocephalic  CHEST/LUNG: Clear to percussion bilaterally; No rales, rhonchi, wheezing, or rubs  HEART: Regular rate and rhythm; No murmurs, rubs, or gallops  ABDOMEN: Soft, Nontender, Nondistended; Bowel sounds present  EXTREMITIES:  2+ Peripheral Pulses, No clubbing, cyanosis, or edema  LYMPH: No lymphadenopathy noted  SKIN: No rashes or lesions    Care Discussed with Consultants/Other Providers [ ] YES  [ ] NO

## 2018-01-18 NOTE — DISCHARGE NOTE ADULT - PATIENT PORTAL LINK FT
“You can access the FollowHealth Patient Portal, offered by [facility], by registering with the following website: [email or web address]”

## 2020-03-18 NOTE — ED PROVIDER NOTE - CPE EDP GASTRO NORM
Called patient, but I was not able to reach him. Left a message to have patient call back to clinic to reschedule his appointment with Dr. Powell. Next available appointment would be after the first week of May.  
normal...

## 2022-10-01 ENCOUNTER — EMERGENCY (EMERGENCY)
Facility: HOSPITAL | Age: 62
LOS: 1 days | Discharge: HOME | End: 2022-10-01
Attending: EMERGENCY MEDICINE | Admitting: EMERGENCY MEDICINE

## 2022-10-01 VITALS
HEART RATE: 83 BPM | DIASTOLIC BLOOD PRESSURE: 82 MMHG | HEIGHT: 67 IN | OXYGEN SATURATION: 100 % | RESPIRATION RATE: 16 BRPM | TEMPERATURE: 98 F | SYSTOLIC BLOOD PRESSURE: 155 MMHG

## 2022-10-01 VITALS
DIASTOLIC BLOOD PRESSURE: 94 MMHG | TEMPERATURE: 98 F | OXYGEN SATURATION: 100 % | HEART RATE: 71 BPM | SYSTOLIC BLOOD PRESSURE: 159 MMHG | RESPIRATION RATE: 18 BRPM

## 2022-10-01 LAB
ALBUMIN SERPL ELPH-MCNC: 4 G/DL — SIGNIFICANT CHANGE UP (ref 3.3–5)
ALP SERPL-CCNC: 85 U/L — SIGNIFICANT CHANGE UP (ref 40–120)
ALT FLD-CCNC: 12 U/L — SIGNIFICANT CHANGE UP (ref 4–41)
ANION GAP SERPL CALC-SCNC: 13 MMOL/L — SIGNIFICANT CHANGE UP (ref 7–14)
APPEARANCE UR: ABNORMAL
AST SERPL-CCNC: 14 U/L — SIGNIFICANT CHANGE UP (ref 4–40)
BACTERIA # UR AUTO: NEGATIVE — SIGNIFICANT CHANGE UP
BASOPHILS # BLD AUTO: 0.03 K/UL — SIGNIFICANT CHANGE UP (ref 0–0.2)
BASOPHILS NFR BLD AUTO: 0.4 % — SIGNIFICANT CHANGE UP (ref 0–2)
BILIRUB SERPL-MCNC: 0.8 MG/DL — SIGNIFICANT CHANGE UP (ref 0.2–1.2)
BILIRUB UR-MCNC: NEGATIVE — SIGNIFICANT CHANGE UP
BLOOD GAS VENOUS COMPREHENSIVE RESULT: SIGNIFICANT CHANGE UP
BUN SERPL-MCNC: 16 MG/DL — SIGNIFICANT CHANGE UP (ref 7–23)
CALCIUM SERPL-MCNC: 8.9 MG/DL — SIGNIFICANT CHANGE UP (ref 8.4–10.5)
CHLORIDE SERPL-SCNC: 95 MMOL/L — LOW (ref 98–107)
CO2 SERPL-SCNC: 22 MMOL/L — SIGNIFICANT CHANGE UP (ref 22–31)
COLOR SPEC: SIGNIFICANT CHANGE UP
CREAT SERPL-MCNC: 1.54 MG/DL — HIGH (ref 0.5–1.3)
DIFF PNL FLD: ABNORMAL
EGFR: 51 ML/MIN/1.73M2 — LOW
EOSINOPHIL # BLD AUTO: 0.03 K/UL — SIGNIFICANT CHANGE UP (ref 0–0.5)
EOSINOPHIL NFR BLD AUTO: 0.4 % — SIGNIFICANT CHANGE UP (ref 0–6)
EPI CELLS # UR: 0 /HPF — SIGNIFICANT CHANGE UP (ref 0–5)
GLUCOSE SERPL-MCNC: 85 MG/DL — SIGNIFICANT CHANGE UP (ref 70–99)
GLUCOSE UR QL: NEGATIVE — SIGNIFICANT CHANGE UP
HCT VFR BLD CALC: 40.1 % — SIGNIFICANT CHANGE UP (ref 39–50)
HGB BLD-MCNC: 12.8 G/DL — LOW (ref 13–17)
HYALINE CASTS # UR AUTO: 2 /LPF — SIGNIFICANT CHANGE UP (ref 0–7)
IANC: 5.85 K/UL — SIGNIFICANT CHANGE UP (ref 1.8–7.4)
IMM GRANULOCYTES NFR BLD AUTO: 0.5 % — SIGNIFICANT CHANGE UP (ref 0–0.9)
KETONES UR-MCNC: NEGATIVE — SIGNIFICANT CHANGE UP
LEUKOCYTE ESTERASE UR-ACNC: NEGATIVE — SIGNIFICANT CHANGE UP
LIDOCAIN IGE QN: 18 U/L — SIGNIFICANT CHANGE UP (ref 7–60)
LYMPHOCYTES # BLD AUTO: 1.32 K/UL — SIGNIFICANT CHANGE UP (ref 1–3.3)
LYMPHOCYTES # BLD AUTO: 16.1 % — SIGNIFICANT CHANGE UP (ref 13–44)
MCHC RBC-ENTMCNC: 25.5 PG — LOW (ref 27–34)
MCHC RBC-ENTMCNC: 31.9 GM/DL — LOW (ref 32–36)
MCV RBC AUTO: 80 FL — SIGNIFICANT CHANGE UP (ref 80–100)
MONOCYTES # BLD AUTO: 0.91 K/UL — HIGH (ref 0–0.9)
MONOCYTES NFR BLD AUTO: 11.1 % — SIGNIFICANT CHANGE UP (ref 2–14)
NEUTROPHILS # BLD AUTO: 5.85 K/UL — SIGNIFICANT CHANGE UP (ref 1.8–7.4)
NEUTROPHILS NFR BLD AUTO: 71.5 % — SIGNIFICANT CHANGE UP (ref 43–77)
NITRITE UR-MCNC: NEGATIVE — SIGNIFICANT CHANGE UP
NRBC # BLD: 0 /100 WBCS — SIGNIFICANT CHANGE UP (ref 0–0)
NRBC # FLD: 0 K/UL — SIGNIFICANT CHANGE UP (ref 0–0)
PH UR: 6.5 — SIGNIFICANT CHANGE UP (ref 5–8)
PLATELET # BLD AUTO: 316 K/UL — SIGNIFICANT CHANGE UP (ref 150–400)
POTASSIUM SERPL-MCNC: 4.2 MMOL/L — SIGNIFICANT CHANGE UP (ref 3.5–5.3)
POTASSIUM SERPL-SCNC: 4.2 MMOL/L — SIGNIFICANT CHANGE UP (ref 3.5–5.3)
PROT SERPL-MCNC: 7.3 G/DL — SIGNIFICANT CHANGE UP (ref 6–8.3)
PROT UR-MCNC: ABNORMAL
RBC # BLD: 5.01 M/UL — SIGNIFICANT CHANGE UP (ref 4.2–5.8)
RBC # FLD: 14.6 % — HIGH (ref 10.3–14.5)
RBC CASTS # UR COMP ASSIST: 294 /HPF — HIGH (ref 0–4)
SODIUM SERPL-SCNC: 130 MMOL/L — LOW (ref 135–145)
SP GR SPEC: 1.01 — SIGNIFICANT CHANGE UP (ref 1.01–1.05)
UROBILINOGEN FLD QL: SIGNIFICANT CHANGE UP
WBC # BLD: 8.18 K/UL — SIGNIFICANT CHANGE UP (ref 3.8–10.5)
WBC # FLD AUTO: 8.18 K/UL — SIGNIFICANT CHANGE UP (ref 3.8–10.5)
WBC UR QL: 4 /HPF — SIGNIFICANT CHANGE UP (ref 0–5)

## 2022-10-01 PROCEDURE — 99285 EMERGENCY DEPT VISIT HI MDM: CPT

## 2022-10-01 PROCEDURE — 74176 CT ABD & PELVIS W/O CONTRAST: CPT | Mod: 26,MA

## 2022-10-01 RX ORDER — SODIUM CHLORIDE 9 MG/ML
1000 INJECTION, SOLUTION INTRAVENOUS ONCE
Refills: 0 | Status: COMPLETED | OUTPATIENT
Start: 2022-10-01 | End: 2022-10-01

## 2022-10-01 RX ADMIN — SODIUM CHLORIDE 1000 MILLILITER(S): 9 INJECTION, SOLUTION INTRAVENOUS at 13:23

## 2022-10-01 NOTE — ED PROVIDER NOTE - PHYSICAL EXAMINATION
VS:  unremarkable    GEN - NAD;   malaise;   A+O x3   HEAD - NC/AT     ENT - PEERL, EOMI, mucous membranes    moist , no discharge      NECK: Neck supple, non-tender without lymphadenopathy, no masses, no JVD  PULM - CTA b/l,  symmetric breath sounds  COR -  normal heart sounds    ABD - , ND, LLQ ttp, soft,  BACK - no CVA tenderness, nontender spine    - penis wnl no bleeding.  R test mild ttp (+)cremasterics bilat.   EXTREMS - no edema, no deformity, warm and well perfused    SKIN - no rash    or bruising      NEUROLOGIC - alert, face symmetric, speech fluent, sensation nl, motor no focal deficit.

## 2022-10-01 NOTE — ED PROVIDER NOTE - NSICDXFAMILYHX_GEN_ALL_CORE_FT
FAMILY HISTORY:  Family history of prostate cancer in father    Mother  Still living? Unknown  Family history of heart disease, Age at diagnosis: Age Unknown

## 2022-10-01 NOTE — ED PROVIDER NOTE - NSICDXPASTMEDICALHX_GEN_ALL_CORE_FT
PAST MEDICAL HISTORY:  CAD (Coronary Artery Disease)     Coronary Stent to LAD and D2 in 1/2011  proximal LCx, Distal LCx,  OM2, ramus intermedius, and diagonal stents    HTN (Hypertension)     Hyperlipidemia

## 2022-10-01 NOTE — ED PROVIDER NOTE - OBJECTIVE STATEMENT
62F p/w L flank pain rad to LLQ x 1 day a/w hematuria yesterday with clots.  Pt has h/o kidney stones last one 20 ya, never had surgery for it.  No V.  No F.  Pt took tylenol at 830-9AM.  No test or penis pain.  no SOB no dizziness.   Poor appetite over the past day.  Today the hematuria has improved to pink.  PMHX HTN CAD w/stents.  PSHX AICD.  meds - plavix.  No T.  NKA.  On exam pt malaised but NAD.  Dry MM.  Mild LLQ ttp, R testicle ttp (+)cremasteric reflex.  Plan check labs eval for anemia, renal function; urine eval for stone or hematuria, CT eval for stone.  Rx fluids, took tylenol recently.  Reass.  Likely d/c home f/u with uro.    VS:  unremarkable    GEN - NAD;   malaise;   A+O x3   HEAD - NC/AT     ENT - PEERL, EOMI, mucous membranes    moist , no discharge      NECK: Neck supple, non-tender without lymphadenopathy, no masses, no JVD  PULM - CTA b/l,  symmetric breath sounds  COR -  normal heart sounds    ABD - , ND, LLQ ttp, soft,  BACK - no CVA tenderness, nontender spine    - penis wnl no bleeding.  R test mild ttp (+)cremasterics bilat.   EXTREMS - no edema, no deformity, warm and well perfused    SKIN - no rash    or bruising      NEUROLOGIC - alert, face symmetric, speech fluent, sensation nl, motor no focal deficit.

## 2022-10-01 NOTE — ED PROVIDER NOTE - CLINICAL SUMMARY MEDICAL DECISION MAKING FREE TEXT BOX
62F p/w L flank pain rad to LLQ x 1 day a/w hematuria yesterday with clots.  Pt has h/o kidney stones last one 20 ya, never had surgery for it.  No V.  No F.  Pt took tylenol at 830-9AM.  No test or penis pain.  no SOB no dizziness.   Poor appetite over the past day.  Today the hematuria has improved to pink.  PMHX HTN CAD w/stents.  PSHX AICD.  meds - plavix.  No T.  NKA.  On exam pt malaised but NAD.  Dry MM.  Mild LLQ ttp, R testicle ttp (+)cremasteric reflex.  Plan check labs eval for anemia, renal function; urine eval for stone or hematuria, CT eval for stone.  Rx fluids, took tylenol recently.  Reass.  Likely d/c home f/u with uro.

## 2022-10-01 NOTE — ED PROVIDER NOTE - PATIENT PORTAL LINK FT
You can access the FollowMyHealth Patient Portal offered by [facility] by registering at the following website: [email or web address]. By joining [facility]’s FollowMyHealth portal, you will also be able to view your health information using other applications (apps) compatible with our system.

## 2022-10-01 NOTE — ED ADULT TRIAGE NOTE - CHIEF COMPLAINT QUOTE
A 48 Hour Holter Monitor was returned and quick scanned to [place]. Symptoms were reported in the patient diary and documented. Report to follow.    pt ambulatory to walk in triage c/o 1 days left lower quadrant pain / flank pain radiating to front with bloody urine overnight. PMH: HTN, CAD stents on plavix, kidney stones

## 2022-10-01 NOTE — ED PROVIDER NOTE - PROGRESS NOTE DETAILS
results noted  explained to patient.   Pt understands.    Next step - admit for inpt workup  vs outpt jitendra: pt stable, no hematuria. no hd instability. signed out by dr boss. uro cleared and established urgent f/u with uro on tues. pt understands instructions, with clear return precautions if worsening bleeding, worsening pain. jitendra: pt stable, no hematuria. no hd instability. signed out by dr boss. uro cleared and established urgent f/u with uro on tues. pt understands instructions, with clear return precautions if worsening bleeding, worsening pain.   instructed to return if difficulty urinating or signs of urinary retention

## 2022-10-01 NOTE — ED PROVIDER NOTE - CARE PLAN
1 Principal Discharge DX:	Lt flank pain  Secondary Diagnosis:	Hematuria  Secondary Diagnosis:	Renal mass

## 2022-10-01 NOTE — CONSULT NOTE ADULT - SUBJECTIVE AND OBJECTIVE BOX
HPI    61 yo male with PMH HTN, CAD s/p stents on ASA, Plavix and nephrolithiasis 20 years ago that passed on its own, presented to the ED with hematuria x 1 day. Patient states he started to pass red blood clots in his urine yesterday, improved today and is now clear pink. Had some brief left flank pain, now resolved. Denied nausea/vomiting, fevers/chills. Also noticed some light pink urine last week that cleared up with drinking water. Today feels he is urinating freely without clot and emptying his bladder completely. Denies tobacco history, is not employed. Denies neurologic changes.     Urology consulted for CT demonstrating 8 cm left upper pole hemorrhagic renal mass and concern for pulmonary metastasis.       PAST MEDICAL & SURGICAL HISTORY:  HTN (Hypertension)      CAD (Coronary Artery Disease)      Coronary Stent  to LAD and D2 in 2011  proximal LCx, Distal LCx,  OM2, ramus intermedius, and diagonal stents      Hyperlipidemia      Stented coronary artery  x4          MEDICATIONS  (STANDING):    MEDICATIONS  (PRN):      FAMILY HISTORY:  Family history of heart disease (Mother)    Family history of prostate cancer in father        Allergies    No Known Allergies    Intolerances        SOCIAL HISTORY:    REVIEW OF SYSTEMS: Otherwise negative as stated in HPI    Physical Exam  Vital signs  T(C): 37.3 (10-01-22 @ 16:34), Max: 37.3 (10-01-22 @ 16:34)  HR: 71 (10-01-22 @ 16:34)  BP: 143/75 (10-01-22 @ 16:34)  SpO2: 100% (10-01-22 @ 16:34)  Wt(kg): --    Output      Gen:  NAD    Pulm:  No respiratory distress  	  CV:  RRR    GI:  S/ND/NT    :  no CVA tenderness  no urine for review        LABS:      10-01 @ 13:05    WBC 8.18  / Hct 40.1  / SCr 1.54     10-01    130<L>  |  95<L>  |  16  ----------------------------<  85  4.2   |  22  |  1.54<H>    Ca    8.9      01 Oct 2022 13:05    TPro  7.3  /  Alb  4.0  /  TBili  0.8  /  DBili  x   /  AST  14  /  ALT  12  /  AlkPhos  85  10-01      Urinalysis Basic - ( 01 Oct 2022 13:30 )    Color: Light Pink / Appearance: Slightly Turbid / S.012 / pH: x  Gluc: x / Ketone: Negative  / Bili: Negative / Urobili: <2 mg/dL   Blood: x / Protein: Trace / Nitrite: Negative   Leuk Esterase: Negative / RBC: 294 /HPF / WBC 4 /HPF   Sq Epi: x / Non Sq Epi: 0 /HPF / Bacteria: Negative    RADIOLOGY:  < from: CT Abdomen and Pelvis No Cont (10.01.22 @ 17:02) >    ACC: [id number] EXAM:  CT ABDOMEN AND PELVIS                          PROCEDURE DATE:  10/01/2022          INTERPRETATION:  CLINICAL INFORMATION: Left flank pain and hematuria.    COMPARISON: No similar cross-sectional priors.    CONTRAST/COMPLICATIONS:  IV Contrast: None  Oral Contrast: None  Complications: None reported time study completion.    PROCEDURE:  CT of the Abdomen and Pelvis was performed in the prone position.  Sagittal and coronal reformats were performed.    FINDINGS:  LOWER CHEST: 2 parenchymal nodules in the left lower lobe measuring 8 mm   each (series 2-33, series 2-28) no pneumothorax nor consolidation. No   pleural effusion. Partially imaged cardiac leads.    LIVER: 1 cm hypoattenuating focus in segment 4A (series 2-28).1.1 cm   hypoattenuating focus in segment 7 (series 2-33).  BILE DUCTS: Normal caliber.  GALLBLADDER: Normal appearing fluid-filled gallbladder. No gallstones nor   pericholecystic fluid.  SPLEEN: Within normal limits.  PANCREAS: Within normal limits.  ADRENALS: Within normal limits.  KIDNEYS/URETERS: Hemorrhagic mass at the upper pole of the left kidney   measuring 8 x 8.5 x 8 cm (TR by AP by CC) with heterogeneous internal   attenuation and high attenuation material in the collecting system and  layering into the left ureter consistent with acute blood. Nonobstructing   left lower pole renal stone. There is extensive perinephric stranding.    Right kidney demonstrates no hydronephrosis nor renal mass. No renal   calculi.    BLADDER: Small volume layering high attenuation material within the   bladder likely blood.  REPRODUCTIVE ORGANS: Prostate is within normal limits.    BOWEL: No bowel obstruction. Appendix is normal.  PERITONEUM: No ascites.  VESSELS: Atherosclerotic changes.  RETROPERITONEUM/LYMPH NODES: Prominent left para-aortic lymph nodes   measuring up to 1 cm in short axis, near the renal hilum.  ABDOMINAL WALL: Fat-containing bilateral inguinal hernias.  BONES: Sacralized L5. Mild degenerative changes spine.    IMPRESSION:    Findings are suspicious for hemorrhagic primary renal malignancy such as   renal cell carcinoma with pulmonary metastases. CT versus MRI with renal   mass protocol can be obtained for further evaluation.    --- End of Report ---          ELVA PINK MD; Resident Radiologist  This document has been electronically signed.  KYLER TAI MD; Attending Radiologist  This document has been electronically signed. Oct  1 2022  6:15PM    < end of copied text >

## 2022-10-01 NOTE — CONSULT NOTE ADULT - ASSESSMENT
63 yo male presented to the ED with hematuria x 1 day that resolved with CT non con demonstrating 8 cm left upper pole hemorrhagic renal mass and concern for pulmonary metastasis.     -- no acute  intervention indicated   -- hematuria and FRANKLYN likely due to hemorrhagic renal mass with brief obstruction of clot  -- CT reviewed demonstrating 8cm left upper pole renal mass without hydronephrosis or renal vein involvement, concern for renal cell cancer with pulmonary metastasis   -- recommend outpatient follow-up for CT renal mass protocol with chest imaging to determine surgical planning  -- patient to follow-up with Dr. Jhonatan Crow on Tuesday, 10/4  -- Dr. Crow's office will call patient on Monday to set up appointment     D/w Dr. Crow  [facility]  [address] [place] [address] [phone]

## 2022-10-02 LAB
CULTURE RESULTS: NO GROWTH — SIGNIFICANT CHANGE UP
SPECIMEN SOURCE: SIGNIFICANT CHANGE UP

## 2022-10-04 ENCOUNTER — APPOINTMENT (OUTPATIENT)
Dept: UROLOGY | Facility: CLINIC | Age: 62
End: 2022-10-04

## 2022-10-04 DIAGNOSIS — R10.9 UNSPECIFIED ABDOMINAL PAIN: ICD-10-CM

## 2022-10-04 PROCEDURE — 99205 OFFICE O/P NEW HI 60 MIN: CPT

## 2022-10-04 NOTE — HISTORY OF PRESENT ILLNESS
[FreeTextEntry1] : He is a 62-year-old male with a history of hypertension, coronary artery disease on aspirin and Plavix, nephrolithiasis.  He presented to the emergency department at [facility] on  with left flank pain and gross hematuria. A non contrast CT demonstrated an 8 cm left upper pole renal mass, 1 cm para-aortic LN. There were also two parenchymal nodules in the left lower lobe of the lung. He was discharged to home. No longer with flank pain, hematuria. He smoked for ~10 years, approx 1 pack per week but quit 30 years ago. He is a former  but now retired. No family history of kidney or bladder cancer.  Father did die of prostate cancer in his 80s. No headaches / vision changes, no bony pain\par \par Anticoagulation: Aspirin / plavix\par All: NKDA\par Social: Occasional EtOH. Smoked for ~10 years, 1 pack / week. Quit ~30 years ago. He is on disability due to his medical comorbidities\par PMHx: CAD s/p PCI , AICD \par PSHx: No abdominal surgeries\par FHx: Father with prostate -  of prostate cancer. He was 88. No family history of kidney cancer\par Labs: \par 10/1/2022: \par Creatinine 1.54\par Hemoglobin: 12.8\par Platelet count: 316\par AST: 14\par ALT: 12\par Calcium: 8.9\par \par Denies gross hematuria, flank pain, fevers, chills, nausea, vomiting.

## 2022-10-04 NOTE — PHYSICAL EXAM
[Normal Appearance] : normal appearance [Well Groomed] : well groomed [Edema] : no peripheral edema [Exaggerated Use Of Accessory Muscles For Inspiration] : no accessory muscle use [Abdomen Tenderness] : non-tender [Costovertebral Angle Tenderness] : no ~M costovertebral angle tenderness [Urethral Meatus] : meatus normal [Epididymis] : the epididymides were normal [Testes Tenderness] : no tenderness of the testes [Prostate Enlargement] : the prostate was not enlarged [Prostate Tenderness] : the prostate was not tender [No Prostate Nodules] : no prostate nodules [Normal Station and Gait] : the gait and station were normal for the patient's age [] : no rash [No Focal Deficits] : no focal deficits [Oriented To Time, Place, And Person] : oriented to person, place, and time [No Palpable Adenopathy] : no palpable adenopathy

## 2022-10-04 NOTE — REVIEW OF SYSTEMS
[Blood in urine that you can see] : blood visible in urine [Told you have blood in urine on a urine test] : told blood was present in a urine test [History of kidney stones] : history of kidney stones [Negative] : Heme/Lymph [FreeTextEntry2] : high blood pressure

## 2022-10-04 NOTE — ASSESSMENT
[FreeTextEntry1] : 62 y.o. M with CAD (aspirin / plavix), AICD, HTN who presents as ED follow-up for gross hematuria and CT findings of a large renal mass\par - CT personally reviewed - large left renal mass with invasion into collecting system with clot into collecting system. Perinephric stranding, small para-aortic node. \par - Complete staging with CT chest / abdomen / pelvis (CT chest and CTU). Cr mildly elevated - will repeat today - possibly elevated due to obstruction from clot at time of study. Unable to get MRI due to AICD. \par - Cystoscopy to complete gross hematuria work-up\par - UA, UCx, urine cytology\par - Repeat CMP, CBC, PSA\par - Discussed pending results of staging work-up, he will likely require radical nephrectomy +/- RPLND. Discussed based on size and location he may require an open approach. Will refer to partner, Dr. Salazar. Briefly discussed risks of procedure. He would need cardiac clearance\par

## 2022-10-04 NOTE — LETTER BODY
[Dear  ___] : Dear  [unfilled], [Consult Letter:] : I had the pleasure of evaluating your patient, [unfilled]. [Please see my note below.] : Please see my note below. [Consult Closing:] : Thank you very much for allowing me to participate in the care of this patient.  If you have any questions, please do not hesitate to contact me. [Sincerely,] : Sincerely, [FreeTextEntry2] : Darrell Carrasco, \par [address] [place] [id number] [FreeTextEntry3] : Jhonatan Crow MD\par [facility] [place]\par [address] [place] [address], [address]\par [place], [place]\par [address]\par p: [phone]\par f: [phone]

## 2022-10-09 LAB
ALBUMIN SERPL ELPH-MCNC: 4.4 G/DL
ALP BLD-CCNC: 92 U/L
ALT SERPL-CCNC: 18 U/L
ANION GAP SERPL CALC-SCNC: 15 MMOL/L
APPEARANCE: CLEAR
AST SERPL-CCNC: 30 U/L
BACTERIA UR CULT: NORMAL
BACTERIA: NEGATIVE
BASOPHILS # BLD AUTO: 0.04 K/UL
BASOPHILS NFR BLD AUTO: 0.6 %
BILIRUB SERPL-MCNC: 0.4 MG/DL
BILIRUBIN URINE: NEGATIVE
BLOOD URINE: ABNORMAL
BUN SERPL-MCNC: 11 MG/DL
CALCIUM SERPL-MCNC: 9.5 MG/DL
CHLORIDE SERPL-SCNC: 100 MMOL/L
CO2 SERPL-SCNC: 23 MMOL/L
COLOR: NORMAL
CREAT SERPL-MCNC: 1.52 MG/DL
EGFR: 51 ML/MIN/1.73M2
EOSINOPHIL # BLD AUTO: 0.13 K/UL
EOSINOPHIL NFR BLD AUTO: 1.8 %
GLUCOSE QUALITATIVE U: NEGATIVE
GLUCOSE SERPL-MCNC: 83 MG/DL
HCT VFR BLD CALC: 40.8 %
HGB BLD-MCNC: 13 G/DL
HYALINE CASTS: 1 /LPF
IMM GRANULOCYTES NFR BLD AUTO: 0.4 %
KETONES URINE: NEGATIVE
LEUKOCYTE ESTERASE URINE: NEGATIVE
LYMPHOCYTES # BLD AUTO: 1.13 K/UL
LYMPHOCYTES NFR BLD AUTO: 16.1 %
MAN DIFF?: NORMAL
MCHC RBC-ENTMCNC: 26.2 PG
MCHC RBC-ENTMCNC: 31.9 GM/DL
MCV RBC AUTO: 82.3 FL
MICROSCOPIC-UA: NORMAL
MONOCYTES # BLD AUTO: 0.75 K/UL
MONOCYTES NFR BLD AUTO: 10.7 %
NEUTROPHILS # BLD AUTO: 4.96 K/UL
NEUTROPHILS NFR BLD AUTO: 70.4 %
NITRITE URINE: NEGATIVE
PH URINE: 6.5
PLATELET # BLD AUTO: 327 K/UL
POTASSIUM SERPL-SCNC: 4.8 MMOL/L
PROT SERPL-MCNC: 7.7 G/DL
PROTEIN URINE: NORMAL
PSA SERPL-MCNC: 0.38 NG/ML
RBC # BLD: 4.96 M/UL
RBC # FLD: 15.4 %
RED BLOOD CELLS URINE: 7 /HPF
SODIUM SERPL-SCNC: 137 MMOL/L
SPECIFIC GRAVITY URINE: 1.01
SQUAMOUS EPITHELIAL CELLS: 0 /HPF
URINE CYTOLOGY: NORMAL
UROBILINOGEN URINE: NORMAL
WBC # FLD AUTO: 7.04 K/UL
WHITE BLOOD CELLS URINE: 1 /HPF

## 2022-10-11 ENCOUNTER — OUTPATIENT (OUTPATIENT)
Dept: OUTPATIENT SERVICES | Facility: HOSPITAL | Age: 62
LOS: 1 days | End: 2022-10-11
Payer: COMMERCIAL

## 2022-10-11 ENCOUNTER — APPOINTMENT (OUTPATIENT)
Dept: CT IMAGING | Facility: IMAGING CENTER | Age: 62
End: 2022-10-11
Payer: SELF-PAY

## 2022-10-11 DIAGNOSIS — N28.89 OTHER SPECIFIED DISORDERS OF KIDNEY AND URETER: ICD-10-CM

## 2022-10-11 PROCEDURE — 71260 CT THORAX DX C+: CPT

## 2022-10-11 PROCEDURE — 74178 CT ABD&PLV WO CNTR FLWD CNTR: CPT

## 2022-10-11 PROCEDURE — 71260 CT THORAX DX C+: CPT | Mod: 26

## 2022-10-11 PROCEDURE — 74178 CT ABD&PLV WO CNTR FLWD CNTR: CPT | Mod: 26

## 2022-10-20 ENCOUNTER — APPOINTMENT (OUTPATIENT)
Dept: UROLOGY | Facility: CLINIC | Age: 62
End: 2022-10-20

## 2022-10-20 VITALS
HEIGHT: 67 IN | WEIGHT: 200 LBS | SYSTOLIC BLOOD PRESSURE: 160 MMHG | HEART RATE: 70 BPM | RESPIRATION RATE: 17 BRPM | DIASTOLIC BLOOD PRESSURE: 88 MMHG | TEMPERATURE: 98.5 F | BODY MASS INDEX: 31.39 KG/M2

## 2022-10-20 PROCEDURE — 99214 OFFICE O/P EST MOD 30 MIN: CPT | Mod: 57

## 2022-10-21 LAB — BACTERIA UR CULT: NORMAL

## 2022-10-25 ENCOUNTER — APPOINTMENT (OUTPATIENT)
Dept: UROLOGY | Facility: CLINIC | Age: 62
End: 2022-10-25
Payer: MEDICARE

## 2022-10-25 ENCOUNTER — OUTPATIENT (OUTPATIENT)
Dept: OUTPATIENT SERVICES | Facility: HOSPITAL | Age: 62
LOS: 1 days | End: 2022-10-25
Payer: COMMERCIAL

## 2022-10-25 VITALS
WEIGHT: 195.99 LBS | HEIGHT: 67 IN | SYSTOLIC BLOOD PRESSURE: 133 MMHG | RESPIRATION RATE: 14 BRPM | HEART RATE: 81 BPM | DIASTOLIC BLOOD PRESSURE: 84 MMHG | TEMPERATURE: 98 F | OXYGEN SATURATION: 99 %

## 2022-10-25 DIAGNOSIS — R31.0 GROSS HEMATURIA: ICD-10-CM

## 2022-10-25 DIAGNOSIS — N28.89 OTHER SPECIFIED DISORDERS OF KIDNEY AND URETER: ICD-10-CM

## 2022-10-25 DIAGNOSIS — Z29.9 ENCOUNTER FOR PROPHYLACTIC MEASURES, UNSPECIFIED: ICD-10-CM

## 2022-10-25 DIAGNOSIS — Z01.818 ENCOUNTER FOR OTHER PREPROCEDURAL EXAMINATION: ICD-10-CM

## 2022-10-25 DIAGNOSIS — Z95.810 PRESENCE OF AUTOMATIC (IMPLANTABLE) CARDIAC DEFIBRILLATOR: Chronic | ICD-10-CM

## 2022-10-25 LAB
ANION GAP SERPL CALC-SCNC: 11 MMOL/L — SIGNIFICANT CHANGE UP (ref 5–17)
BLD GP AB SCN SERPL QL: NEGATIVE — SIGNIFICANT CHANGE UP
BUN SERPL-MCNC: 18 MG/DL — SIGNIFICANT CHANGE UP (ref 7–23)
CALCIUM SERPL-MCNC: 9.4 MG/DL — SIGNIFICANT CHANGE UP (ref 8.4–10.5)
CHLORIDE SERPL-SCNC: 100 MMOL/L — SIGNIFICANT CHANGE UP (ref 96–108)
CO2 SERPL-SCNC: 25 MMOL/L — SIGNIFICANT CHANGE UP (ref 22–31)
CREAT SERPL-MCNC: 1.72 MG/DL — HIGH (ref 0.5–1.3)
EGFR: 44 ML/MIN/1.73M2 — LOW
GLUCOSE SERPL-MCNC: 94 MG/DL — SIGNIFICANT CHANGE UP (ref 70–99)
HCT VFR BLD CALC: 36.4 % — LOW (ref 39–50)
HGB BLD-MCNC: 11.6 G/DL — LOW (ref 13–17)
MCHC RBC-ENTMCNC: 25.2 PG — LOW (ref 27–34)
MCHC RBC-ENTMCNC: 31.9 GM/DL — LOW (ref 32–36)
MCV RBC AUTO: 79.1 FL — LOW (ref 80–100)
NRBC # BLD: 0 /100 WBCS — SIGNIFICANT CHANGE UP (ref 0–0)
PLATELET # BLD AUTO: 330 K/UL — SIGNIFICANT CHANGE UP (ref 150–400)
POTASSIUM SERPL-MCNC: 3.9 MMOL/L — SIGNIFICANT CHANGE UP (ref 3.5–5.3)
POTASSIUM SERPL-SCNC: 3.9 MMOL/L — SIGNIFICANT CHANGE UP (ref 3.5–5.3)
RBC # BLD: 4.6 M/UL — SIGNIFICANT CHANGE UP (ref 4.2–5.8)
RBC # FLD: 14.3 % — SIGNIFICANT CHANGE UP (ref 10.3–14.5)
RH IG SCN BLD-IMP: NEGATIVE — SIGNIFICANT CHANGE UP
SODIUM SERPL-SCNC: 136 MMOL/L — SIGNIFICANT CHANGE UP (ref 135–145)
WBC # BLD: 6.83 K/UL — SIGNIFICANT CHANGE UP (ref 3.8–10.5)
WBC # FLD AUTO: 6.83 K/UL — SIGNIFICANT CHANGE UP (ref 3.8–10.5)

## 2022-10-25 PROCEDURE — 86901 BLOOD TYPING SEROLOGIC RH(D): CPT

## 2022-10-25 PROCEDURE — 80048 BASIC METABOLIC PNL TOTAL CA: CPT

## 2022-10-25 PROCEDURE — 52001 CYSTO W/IRRG&EVAC MLT CLOTS: CPT

## 2022-10-25 PROCEDURE — 86850 RBC ANTIBODY SCREEN: CPT

## 2022-10-25 PROCEDURE — G0463: CPT

## 2022-10-25 PROCEDURE — 85027 COMPLETE CBC AUTOMATED: CPT

## 2022-10-25 PROCEDURE — 86900 BLOOD TYPING SEROLOGIC ABO: CPT

## 2022-10-25 RX ORDER — ISOSORBIDE MONONITRATE 60 MG/1
1 TABLET, EXTENDED RELEASE ORAL
Qty: 0 | Refills: 0 | DISCHARGE

## 2022-10-25 RX ORDER — FAMOTIDINE 10 MG/ML
1 INJECTION INTRAVENOUS
Qty: 0 | Refills: 0 | DISCHARGE

## 2022-10-25 RX ORDER — CEFAZOLIN SODIUM 1 G
2000 VIAL (EA) INJECTION ONCE
Refills: 0 | Status: DISCONTINUED | OUTPATIENT
Start: 2022-10-31 | End: 2022-11-01

## 2022-10-25 RX ORDER — AMLODIPINE BESYLATE 2.5 MG/1
1 TABLET ORAL
Qty: 0 | Refills: 0 | DISCHARGE

## 2022-10-25 RX ORDER — FUROSEMIDE 40 MG
1 TABLET ORAL
Qty: 0 | Refills: 0 | DISCHARGE

## 2022-10-25 NOTE — H&P PST ADULT - LANGUAGE ASSISTANCE NEEDED
Provider speaks Faroese- Evaluation conducted in patient's native language/No-Patient/Caregiver offered and refused free interpretation services.

## 2022-10-25 NOTE — H&P PST ADULT - PROBLEM SELECTOR PLAN 2
Dx: Cystoscopy Robotic Left Nephroureterectomy retroperitoneal lymph node dissection on 10/31/22.  PST labs pending  AC: Plavix and aspirin- Stopped Plavix 10/24/22 per cardiologist recs  Cardiac evaluation pending-1 week ago  AICD interrogation- >6 months ago- will go to EP office today  Covid swab 10/28/200

## 2022-10-25 NOTE — H&P PST ADULT - ATTENDING COMMENTS
Patient seen and examined, plan for robotic assisted left laparoscopic radical nephrectomy possible open and all indicated procedures

## 2022-10-25 NOTE — H&P PST ADULT - HISTORY OF PRESENT ILLNESS
63 y/o M with PMHx of HTN, HLD, CAD (stents x4 on ASA/Plavix), AICD (4 years ago), kidney stones,  Presented to the [facility] at [facility] on October 1 with left flank pain and gross hematuria. A non contrast CT demonstrated an 8 cm left upper pole renal mass, 1 cm para-aortic LN. There were also two parenchymal nodules in the left lower lobe of the lung. Patient was discharged to home and now scheduled for Cystoscopy Robotic Left Nephroureterectomy retroperitoneal lymph node dissection on 10/31/22.   Today, still c/o gross hematuria and left flank pain but no urinary incontinence, no urinary retention, no urinary urgency and no urinary frequency.  CT chest with two pulmonary nodules 9 mm and 6 mm stable in appearance.   He saw his cardiologist and demonstrated nuclear stress test with EF 51% and no evidence of ischemia. He was told to stop his Plavix 1 week prior to OR.- will obtain documentation    Hx of Covid-19 Infx. 6/2021- Asymptomatic  Covid swab on 10/28/22

## 2022-10-25 NOTE — H&P PST ADULT - NSICDXPASTSURGICALHX_GEN_ALL_CORE_FT
PAST SURGICAL HISTORY:  AICD (automatic cardioverter/defibrillator) present 4 years ago    Stented coronary artery x4

## 2022-10-25 NOTE — H&P PST ADULT - ASSESSMENT
JOCELINI VTE 2.0 SCORE [CLOT updated 2019]    AGE RELATED RISK FACTORS                                                       MOBILITY RELATED FACTORS  [ ] Age 41-60 years                                            (1 Point)                    [ ] Bed rest                                                        (1 Point)  [X ] Age: 61-74 years                                           (2 Points)                  [ ] Plaster cast                                                   (2 Points)  [ ] Age= 75 years                                              (3 Points)                    [ ] Bed bound for more than 72 hours                 (2 Points)    DISEASE RELATED RISK FACTORS                                               GENDER SPECIFIC FACTORS  [ ] Edema in the lower extremities                       (1 Point)              [ ] Pregnancy                                                     (1 Point)  [ ] Varicose veins                                               (1 Point)                     [ ] Post-partum < 6 weeks                                   (1 Point)             [X] BMI > 25 Kg/m2                                            (1 Point)                     [ ] Hormonal therapy  or oral contraception          (1 Point)                 [ ] Sepsis (in the previous month)                        (1 Point)               [ ] History of pregnancy complications                 (1 point)  [ ] Pneumonia or serious lung disease                                               [ ] Unexplained or recurrent                     (1 Point)           (in the previous month)                               (1 Point)  [ ] Abnormal pulmonary function test                     (1 Point)                 SURGERY RELATED RISK FACTORS  [ ] Acute myocardial infarction                              (1 Point)               [ ]  Section                                             (1 Point)  [ ] Congestive heart failure (in the previous month)  (1 Point)      [ ] Minor surgery                                                  (1 Point)   [ ] Inflammatory bowel disease                             (1 Point)               [ ] Arthroscopic surgery                                        (2 Points)  [ ] Central venous access                                      (2 Points)                [X ] General surgery lasting more than 45 minutes (2 points)  [ ] Malignancy- Present or previous                   (2 Points)                [ ] Elective arthroplasty                                         (5 points)    [ ] Stroke (in the previous month)                          (5 Points)                                                                                                                                                           HEMATOLOGY RELATED FACTORS                                                 TRAUMA RELATED RISK FACTORS  [ ] Prior episodes of VTE                                     (3 Points)                [ ] Fracture of the hip, pelvis, or leg                       (5 Points)  [ ] Positive family history for VTE                         (3 Points)             [ ] Acute spinal cord injury (in the previous month)  (5 Points)  [ ] Prothrombin 42056 A                                     (3 Points)               [ ] Paralysis  (less than 1 month)                             (5 Points)  [ ] Factor V Leiden                                             (3 Points)                  [ ] Multiple Trauma within 1 month                        (5 Points)  [ ] Lupus anticoagulants                                     (3 Points)                                                           [ ] Anticardiolipin antibodies                               (3 Points)                                                       [ ] High homocysteine in the blood                      (3 Points)                                             [ ] Other congenital or acquired thrombophilia      (3 Points)                                                [ ] Heparin induced thrombocytopenia                  (3 Points)                                     Total Score [     5     ]

## 2022-10-25 NOTE — H&P PST ADULT - NS MD HP INPLANTS MED DEV
[id number] model [id number] - can't remember last interrogation/Automatic Implantable Cardioverter Defibrillator

## 2022-10-28 ENCOUNTER — OUTPATIENT (OUTPATIENT)
Dept: OUTPATIENT SERVICES | Facility: HOSPITAL | Age: 62
LOS: 1 days | End: 2022-10-28
Payer: COMMERCIAL

## 2022-10-28 DIAGNOSIS — Z11.52 ENCOUNTER FOR SCREENING FOR COVID-19: ICD-10-CM

## 2022-10-28 DIAGNOSIS — Z95.810 PRESENCE OF AUTOMATIC (IMPLANTABLE) CARDIAC DEFIBRILLATOR: Chronic | ICD-10-CM

## 2022-10-28 LAB — SARS-COV-2 RNA SPEC QL NAA+PROBE: SIGNIFICANT CHANGE UP

## 2022-10-28 PROCEDURE — U0005: CPT

## 2022-10-28 PROCEDURE — U0003: CPT

## 2022-10-28 PROCEDURE — C9803: CPT

## 2022-10-30 ENCOUNTER — TRANSCRIPTION ENCOUNTER (OUTPATIENT)
Age: 62
End: 2022-10-30

## 2022-10-31 ENCOUNTER — RESULT REVIEW (OUTPATIENT)
Age: 62
End: 2022-10-31

## 2022-10-31 ENCOUNTER — TRANSCRIPTION ENCOUNTER (OUTPATIENT)
Age: 62
End: 2022-10-31

## 2022-10-31 ENCOUNTER — INPATIENT (INPATIENT)
Facility: HOSPITAL | Age: 62
LOS: 2 days | Discharge: HOME | DRG: 657 | End: 2022-11-03
Attending: UROLOGY | Admitting: UROLOGY
Payer: COMMERCIAL

## 2022-10-31 ENCOUNTER — APPOINTMENT (OUTPATIENT)
Dept: UROLOGY | Facility: HOSPITAL | Age: 62
End: 2022-10-31

## 2022-10-31 VITALS
SYSTOLIC BLOOD PRESSURE: 139 MMHG | RESPIRATION RATE: 18 BRPM | DIASTOLIC BLOOD PRESSURE: 90 MMHG | TEMPERATURE: 99 F | HEART RATE: 78 BPM | WEIGHT: 195.99 LBS | HEIGHT: 67 IN | OXYGEN SATURATION: 98 %

## 2022-10-31 DIAGNOSIS — Z95.810 PRESENCE OF AUTOMATIC (IMPLANTABLE) CARDIAC DEFIBRILLATOR: Chronic | ICD-10-CM

## 2022-10-31 DIAGNOSIS — N28.89 OTHER SPECIFIED DISORDERS OF KIDNEY AND URETER: ICD-10-CM

## 2022-10-31 LAB
ANION GAP SERPL CALC-SCNC: 11 MMOL/L — SIGNIFICANT CHANGE UP (ref 5–17)
BASOPHILS # BLD AUTO: 0.02 K/UL — SIGNIFICANT CHANGE UP (ref 0–0.2)
BASOPHILS NFR BLD AUTO: 0.2 % — SIGNIFICANT CHANGE UP (ref 0–2)
BUN SERPL-MCNC: 12 MG/DL — SIGNIFICANT CHANGE UP (ref 7–23)
CALCIUM SERPL-MCNC: 7.6 MG/DL — LOW (ref 8.4–10.5)
CHLORIDE SERPL-SCNC: 105 MMOL/L — SIGNIFICANT CHANGE UP (ref 96–108)
CO2 SERPL-SCNC: 23 MMOL/L — SIGNIFICANT CHANGE UP (ref 22–31)
CREAT SERPL-MCNC: 1.49 MG/DL — HIGH (ref 0.5–1.3)
EGFR: 53 ML/MIN/1.73M2 — LOW
EOSINOPHIL # BLD AUTO: 0.03 K/UL — SIGNIFICANT CHANGE UP (ref 0–0.5)
EOSINOPHIL NFR BLD AUTO: 0.3 % — SIGNIFICANT CHANGE UP (ref 0–6)
GAS PNL BLDA: SIGNIFICANT CHANGE UP
GLUCOSE SERPL-MCNC: 107 MG/DL — HIGH (ref 70–99)
HCT VFR BLD CALC: 27.7 % — LOW (ref 39–50)
HGB BLD-MCNC: 9 G/DL — LOW (ref 13–17)
IMM GRANULOCYTES NFR BLD AUTO: 0.4 % — SIGNIFICANT CHANGE UP (ref 0–0.9)
LYMPHOCYTES # BLD AUTO: 0.61 K/UL — LOW (ref 1–3.3)
LYMPHOCYTES # BLD AUTO: 5.5 % — LOW (ref 13–44)
MCHC RBC-ENTMCNC: 25.9 PG — LOW (ref 27–34)
MCHC RBC-ENTMCNC: 32.5 GM/DL — SIGNIFICANT CHANGE UP (ref 32–36)
MCV RBC AUTO: 79.8 FL — LOW (ref 80–100)
MONOCYTES # BLD AUTO: 0.59 K/UL — SIGNIFICANT CHANGE UP (ref 0–0.9)
MONOCYTES NFR BLD AUTO: 5.3 % — SIGNIFICANT CHANGE UP (ref 2–14)
NEUTROPHILS # BLD AUTO: 9.76 K/UL — HIGH (ref 1.8–7.4)
NEUTROPHILS NFR BLD AUTO: 88.3 % — HIGH (ref 43–77)
NRBC # BLD: 0 /100 WBCS — SIGNIFICANT CHANGE UP (ref 0–0)
PLATELET # BLD AUTO: 261 K/UL — SIGNIFICANT CHANGE UP (ref 150–400)
POTASSIUM SERPL-MCNC: 4.5 MMOL/L — SIGNIFICANT CHANGE UP (ref 3.5–5.3)
POTASSIUM SERPL-SCNC: 4.5 MMOL/L — SIGNIFICANT CHANGE UP (ref 3.5–5.3)
RBC # BLD: 3.47 M/UL — LOW (ref 4.2–5.8)
RBC # FLD: 14.6 % — HIGH (ref 10.3–14.5)
RH IG SCN BLD-IMP: NEGATIVE — SIGNIFICANT CHANGE UP
SODIUM SERPL-SCNC: 139 MMOL/L — SIGNIFICANT CHANGE UP (ref 135–145)
WBC # BLD: 11.05 K/UL — HIGH (ref 3.8–10.5)
WBC # FLD AUTO: 11.05 K/UL — HIGH (ref 3.8–10.5)

## 2022-10-31 PROCEDURE — 50545 LAPARO RADICAL NEPHRECTOMY: CPT | Mod: LT

## 2022-10-31 PROCEDURE — 88307 TISSUE EXAM BY PATHOLOGIST: CPT | Mod: 26

## 2022-10-31 PROCEDURE — 52000 CYSTOURETHROSCOPY: CPT

## 2022-10-31 PROCEDURE — 71045 X-RAY EXAM CHEST 1 VIEW: CPT | Mod: 26

## 2022-10-31 DEVICE — STAPLER COVIDIEN TRI-STAPLE 45MM TAN RELOAD: Type: IMPLANTABLE DEVICE | Site: LEFT | Status: FUNCTIONAL

## 2022-10-31 DEVICE — VISTASEAL FIBRIN HUMAN 4ML: Type: IMPLANTABLE DEVICE | Site: LEFT | Status: FUNCTIONAL

## 2022-10-31 DEVICE — LIGATING CLIPS WECK HEMOLOK POLYMER LARGE (PURPLE) 6: Type: IMPLANTABLE DEVICE | Site: LEFT | Status: FUNCTIONAL

## 2022-10-31 DEVICE — SURGICEL NU-KNIT 6 X 9": Type: IMPLANTABLE DEVICE | Site: LEFT | Status: FUNCTIONAL

## 2022-10-31 RX ORDER — ONDANSETRON 8 MG/1
4 TABLET, FILM COATED ORAL ONCE
Refills: 0 | Status: DISCONTINUED | OUTPATIENT
Start: 2022-10-31 | End: 2022-11-01

## 2022-10-31 RX ORDER — SODIUM CHLORIDE 9 MG/ML
3 INJECTION INTRAMUSCULAR; INTRAVENOUS; SUBCUTANEOUS EVERY 8 HOURS
Refills: 0 | Status: DISCONTINUED | OUTPATIENT
Start: 2022-10-31 | End: 2022-10-31

## 2022-10-31 RX ORDER — INFLUENZA VIRUS VACCINE 15; 15; 15; 15 UG/.5ML; UG/.5ML; UG/.5ML; UG/.5ML
0.5 SUSPENSION INTRAMUSCULAR ONCE
Refills: 0 | Status: DISCONTINUED | OUTPATIENT
Start: 2022-10-31 | End: 2022-11-03

## 2022-10-31 RX ORDER — CARVEDILOL PHOSPHATE 80 MG/1
25 CAPSULE, EXTENDED RELEASE ORAL EVERY 12 HOURS
Refills: 0 | Status: DISCONTINUED | OUTPATIENT
Start: 2022-10-31 | End: 2022-11-03

## 2022-10-31 RX ORDER — PANTOPRAZOLE SODIUM 20 MG/1
40 TABLET, DELAYED RELEASE ORAL
Refills: 0 | Status: DISCONTINUED | OUTPATIENT
Start: 2022-10-31 | End: 2022-11-03

## 2022-10-31 RX ORDER — HYDROMORPHONE HYDROCHLORIDE 2 MG/ML
0.5 INJECTION INTRAMUSCULAR; INTRAVENOUS; SUBCUTANEOUS
Refills: 0 | Status: DISCONTINUED | OUTPATIENT
Start: 2022-10-31 | End: 2022-11-01

## 2022-10-31 RX ORDER — SODIUM CHLORIDE 9 MG/ML
1000 INJECTION, SOLUTION INTRAVENOUS
Refills: 0 | Status: DISCONTINUED | OUTPATIENT
Start: 2022-10-31 | End: 2022-11-01

## 2022-10-31 RX ORDER — OXYCODONE HYDROCHLORIDE 5 MG/1
5 TABLET ORAL EVERY 6 HOURS
Refills: 0 | Status: DISCONTINUED | OUTPATIENT
Start: 2022-10-31 | End: 2022-11-01

## 2022-10-31 RX ORDER — LIDOCAINE HCL 20 MG/ML
0.2 VIAL (ML) INJECTION ONCE
Refills: 0 | Status: COMPLETED | OUTPATIENT
Start: 2022-10-31 | End: 2022-10-31

## 2022-10-31 RX ORDER — SENNA PLUS 8.6 MG/1
2 TABLET ORAL AT BEDTIME
Refills: 0 | Status: DISCONTINUED | OUTPATIENT
Start: 2022-10-31 | End: 2022-11-03

## 2022-10-31 RX ORDER — ASPIRIN/CALCIUM CARB/MAGNESIUM 324 MG
81 TABLET ORAL DAILY
Refills: 0 | Status: DISCONTINUED | OUTPATIENT
Start: 2022-10-31 | End: 2022-11-03

## 2022-10-31 RX ORDER — ACETAMINOPHEN 500 MG
1000 TABLET ORAL ONCE
Refills: 0 | Status: COMPLETED | OUTPATIENT
Start: 2022-10-31 | End: 2022-10-31

## 2022-10-31 RX ORDER — ACETAMINOPHEN 500 MG
650 TABLET ORAL EVERY 6 HOURS
Refills: 0 | Status: DISCONTINUED | OUTPATIENT
Start: 2022-10-31 | End: 2022-11-03

## 2022-10-31 RX ORDER — SIMVASTATIN 20 MG/1
40 TABLET, FILM COATED ORAL AT BEDTIME
Refills: 0 | Status: DISCONTINUED | OUTPATIENT
Start: 2022-10-31 | End: 2022-11-03

## 2022-10-31 RX ORDER — HEPARIN SODIUM 5000 [USP'U]/ML
5000 INJECTION INTRAVENOUS; SUBCUTANEOUS EVERY 8 HOURS
Refills: 0 | Status: DISCONTINUED | OUTPATIENT
Start: 2022-10-31 | End: 2022-11-03

## 2022-10-31 RX ADMIN — HEPARIN SODIUM 5000 UNIT(S): 5000 INJECTION INTRAVENOUS; SUBCUTANEOUS at 15:29

## 2022-10-31 RX ADMIN — SODIUM CHLORIDE 3 MILLILITER(S): 9 INJECTION INTRAMUSCULAR; INTRAVENOUS; SUBCUTANEOUS at 06:00

## 2022-10-31 RX ADMIN — HYDROMORPHONE HYDROCHLORIDE 0.5 MILLIGRAM(S): 2 INJECTION INTRAMUSCULAR; INTRAVENOUS; SUBCUTANEOUS at 15:55

## 2022-10-31 RX ADMIN — Medication 1000 MILLIGRAM(S): at 21:41

## 2022-10-31 RX ADMIN — HYDROMORPHONE HYDROCHLORIDE 0.5 MILLIGRAM(S): 2 INJECTION INTRAMUSCULAR; INTRAVENOUS; SUBCUTANEOUS at 15:00

## 2022-10-31 RX ADMIN — OXYCODONE HYDROCHLORIDE 5 MILLIGRAM(S): 5 TABLET ORAL at 18:12

## 2022-10-31 RX ADMIN — HYDROMORPHONE HYDROCHLORIDE 0.5 MILLIGRAM(S): 2 INJECTION INTRAMUSCULAR; INTRAVENOUS; SUBCUTANEOUS at 15:32

## 2022-10-31 RX ADMIN — Medication 400 MILLIGRAM(S): at 21:26

## 2022-10-31 RX ADMIN — SIMVASTATIN 40 MILLIGRAM(S): 20 TABLET, FILM COATED ORAL at 21:24

## 2022-10-31 RX ADMIN — OXYCODONE HYDROCHLORIDE 5 MILLIGRAM(S): 5 TABLET ORAL at 18:55

## 2022-10-31 RX ADMIN — HEPARIN SODIUM 5000 UNIT(S): 5000 INJECTION INTRAVENOUS; SUBCUTANEOUS at 21:24

## 2022-10-31 RX ADMIN — HYDROMORPHONE HYDROCHLORIDE 0.5 MILLIGRAM(S): 2 INJECTION INTRAMUSCULAR; INTRAVENOUS; SUBCUTANEOUS at 14:38

## 2022-10-31 RX ADMIN — CARVEDILOL PHOSPHATE 25 MILLIGRAM(S): 80 CAPSULE, EXTENDED RELEASE ORAL at 19:17

## 2022-10-31 NOTE — PATIENT PROFILE ADULT - NSPROIMPLANTSMEDDEV_GEN_A_NUR
Automatic Implantable Cardioverter Defibrillator coronary stent/Automatic Implantable Cardioverter Defibrillator

## 2022-10-31 NOTE — PATIENT PROFILE ADULT - FALL HARM RISK - UNIVERSAL INTERVENTIONS
Bed in lowest position, wheels locked, appropriate side rails in place/Call bell, personal items and telephone in reach/Instruct patient to call for assistance before getting out of bed or chair/Non-slip footwear when patient is out of bed/[place] to call system/Physically safe environment - no spills, clutter or unnecessary equipment/Purposeful Proactive Rounding/Room/bathroom lighting operational, light cord in reach

## 2022-10-31 NOTE — PRE-ANESTHESIA EVALUATION ADULT - NS MD HP INPLANTS MED DEV
[id number] model [id number] - can't remember last interrogation/Automatic Implantable Cardioverter Defibrillator

## 2022-10-31 NOTE — BRIEF OPERATIVE NOTE - NSICDXBRIEFPROCEDURE_GEN_ALL_CORE_FT
PROCEDURES:  Nephroureterectomy, robot-assisted, laparoscopic, with cystoscopy 01-Nov-2022 10:22:33  John Gonzalez

## 2022-10-31 NOTE — BRIEF OPERATIVE NOTE - OPERATION/FINDINGS
Cystoscopy, no sig clot burden or tumor identified.   Robotic-assist lap left nephroureterectomy  RPLND Cystoscopy, no sig clot burden or tumor identified.   Robotic-assist lap left nephroureterectomy and adrenalectomy  RPLND    Dictation: [id number]

## 2022-10-31 NOTE — PROGRESS NOTE ADULT - ASSESSMENT
62 year old male s/p left robotic nephroureterectomy, cystoscopy, RPLND    -labs in AM  -clear liquid diet  -nephrology consult- cr 1.7 preop  -f/u CXR read  -analgesia as needed  -OOB/DVT prophylaxis/incentive spirometry 62 year old male s/p left robotic nephroureterectomy, cystoscopy, RPLND    -labs in AM  -clear liquid diet, advance with GI function  -f/u GI function  -nephrology consult- cr 1.7 preop  -f/u CXR read  -analgesia as needed  -OOB/DVT prophylaxis/incentive spirometry

## 2022-11-01 DIAGNOSIS — N18.9 CHRONIC KIDNEY DISEASE, UNSPECIFIED: ICD-10-CM

## 2022-11-01 LAB
ANION GAP SERPL CALC-SCNC: 12 MMOL/L — SIGNIFICANT CHANGE UP (ref 5–17)
BUN SERPL-MCNC: 13 MG/DL — SIGNIFICANT CHANGE UP (ref 7–23)
CALCIUM SERPL-MCNC: 8.2 MG/DL — LOW (ref 8.4–10.5)
CHLORIDE SERPL-SCNC: 101 MMOL/L — SIGNIFICANT CHANGE UP (ref 96–108)
CO2 SERPL-SCNC: 24 MMOL/L — SIGNIFICANT CHANGE UP (ref 22–31)
CREAT SERPL-MCNC: 1.69 MG/DL — HIGH (ref 0.5–1.3)
EGFR: 45 ML/MIN/1.73M2 — LOW
GLUCOSE SERPL-MCNC: 77 MG/DL — SIGNIFICANT CHANGE UP (ref 70–99)
HCT VFR BLD CALC: 29.9 % — LOW (ref 39–50)
HGB BLD-MCNC: 9.3 G/DL — LOW (ref 13–17)
MCHC RBC-ENTMCNC: 25.5 PG — LOW (ref 27–34)
MCHC RBC-ENTMCNC: 31.1 GM/DL — LOW (ref 32–36)
MCV RBC AUTO: 81.9 FL — SIGNIFICANT CHANGE UP (ref 80–100)
NRBC # BLD: 0 /100 WBCS — SIGNIFICANT CHANGE UP (ref 0–0)
PLATELET # BLD AUTO: 271 K/UL — SIGNIFICANT CHANGE UP (ref 150–400)
POTASSIUM SERPL-MCNC: 3.9 MMOL/L — SIGNIFICANT CHANGE UP (ref 3.5–5.3)
POTASSIUM SERPL-SCNC: 3.9 MMOL/L — SIGNIFICANT CHANGE UP (ref 3.5–5.3)
RBC # BLD: 3.65 M/UL — LOW (ref 4.2–5.8)
RBC # FLD: 14.6 % — HIGH (ref 10.3–14.5)
SODIUM SERPL-SCNC: 137 MMOL/L — SIGNIFICANT CHANGE UP (ref 135–145)
WBC # BLD: 6.67 K/UL — SIGNIFICANT CHANGE UP (ref 3.8–10.5)
WBC # FLD AUTO: 6.67 K/UL — SIGNIFICANT CHANGE UP (ref 3.8–10.5)

## 2022-11-01 PROCEDURE — 99223 1ST HOSP IP/OBS HIGH 75: CPT | Mod: GC

## 2022-11-01 RX ORDER — ACETAMINOPHEN 500 MG
1000 TABLET ORAL ONCE
Refills: 0 | Status: COMPLETED | OUTPATIENT
Start: 2022-11-01 | End: 2022-11-01

## 2022-11-01 RX ORDER — SODIUM CHLORIDE 9 MG/ML
1000 INJECTION, SOLUTION INTRAVENOUS
Refills: 0 | Status: DISCONTINUED | OUTPATIENT
Start: 2022-11-01 | End: 2022-11-02

## 2022-11-01 RX ORDER — OXYCODONE HYDROCHLORIDE 5 MG/1
10 TABLET ORAL EVERY 4 HOURS
Refills: 0 | Status: DISCONTINUED | OUTPATIENT
Start: 2022-11-01 | End: 2022-11-03

## 2022-11-01 RX ORDER — SODIUM CHLORIDE 9 MG/ML
500 INJECTION, SOLUTION INTRAVENOUS ONCE
Refills: 0 | Status: COMPLETED | OUTPATIENT
Start: 2022-11-01 | End: 2022-11-01

## 2022-11-01 RX ORDER — OXYCODONE HYDROCHLORIDE 5 MG/1
5 TABLET ORAL EVERY 4 HOURS
Refills: 0 | Status: DISCONTINUED | OUTPATIENT
Start: 2022-11-01 | End: 2022-11-03

## 2022-11-01 RX ADMIN — Medication 1000 MILLIGRAM(S): at 22:22

## 2022-11-01 RX ADMIN — OXYCODONE HYDROCHLORIDE 10 MILLIGRAM(S): 5 TABLET ORAL at 10:09

## 2022-11-01 RX ADMIN — SIMVASTATIN 40 MILLIGRAM(S): 20 TABLET, FILM COATED ORAL at 21:51

## 2022-11-01 RX ADMIN — HEPARIN SODIUM 5000 UNIT(S): 5000 INJECTION INTRAVENOUS; SUBCUTANEOUS at 14:06

## 2022-11-01 RX ADMIN — OXYCODONE HYDROCHLORIDE 10 MILLIGRAM(S): 5 TABLET ORAL at 15:05

## 2022-11-01 RX ADMIN — PANTOPRAZOLE SODIUM 40 MILLIGRAM(S): 20 TABLET, DELAYED RELEASE ORAL at 05:53

## 2022-11-01 RX ADMIN — CARVEDILOL PHOSPHATE 25 MILLIGRAM(S): 80 CAPSULE, EXTENDED RELEASE ORAL at 21:52

## 2022-11-01 RX ADMIN — OXYCODONE HYDROCHLORIDE 10 MILLIGRAM(S): 5 TABLET ORAL at 02:00

## 2022-11-01 RX ADMIN — HEPARIN SODIUM 5000 UNIT(S): 5000 INJECTION INTRAVENOUS; SUBCUTANEOUS at 05:53

## 2022-11-01 RX ADMIN — Medication 10 MILLIGRAM(S): at 05:52

## 2022-11-01 RX ADMIN — OXYCODONE HYDROCHLORIDE 10 MILLIGRAM(S): 5 TABLET ORAL at 14:05

## 2022-11-01 RX ADMIN — OXYCODONE HYDROCHLORIDE 10 MILLIGRAM(S): 5 TABLET ORAL at 06:34

## 2022-11-01 RX ADMIN — OXYCODONE HYDROCHLORIDE 10 MILLIGRAM(S): 5 TABLET ORAL at 21:16

## 2022-11-01 RX ADMIN — CARVEDILOL PHOSPHATE 25 MILLIGRAM(S): 80 CAPSULE, EXTENDED RELEASE ORAL at 06:04

## 2022-11-01 RX ADMIN — SODIUM CHLORIDE 500 MILLILITER(S): 9 INJECTION, SOLUTION INTRAVENOUS at 04:07

## 2022-11-01 RX ADMIN — OXYCODONE HYDROCHLORIDE 10 MILLIGRAM(S): 5 TABLET ORAL at 05:34

## 2022-11-01 RX ADMIN — OXYCODONE HYDROCHLORIDE 10 MILLIGRAM(S): 5 TABLET ORAL at 20:46

## 2022-11-01 RX ADMIN — Medication 81 MILLIGRAM(S): at 14:05

## 2022-11-01 RX ADMIN — HEPARIN SODIUM 5000 UNIT(S): 5000 INJECTION INTRAVENOUS; SUBCUTANEOUS at 21:52

## 2022-11-01 RX ADMIN — OXYCODONE HYDROCHLORIDE 10 MILLIGRAM(S): 5 TABLET ORAL at 11:09

## 2022-11-01 RX ADMIN — OXYCODONE HYDROCHLORIDE 10 MILLIGRAM(S): 5 TABLET ORAL at 01:01

## 2022-11-01 RX ADMIN — SODIUM CHLORIDE 75 MILLILITER(S): 9 INJECTION, SOLUTION INTRAVENOUS at 09:30

## 2022-11-01 RX ADMIN — Medication 400 MILLIGRAM(S): at 21:52

## 2022-11-01 NOTE — CONSULT NOTE ADULT - ASSESSMENT
63 y/o M with PMHx of HTN, HLD, CAD (stents x4 on ASA/Plavix), AICD (4 years ago), kidney stones, with known left upper pole renal mass comes in for an elective Cystoscopy & Robotic Left Nephroureterectomy retroperitoneal lymph node dissection done on 10/31/22.

## 2022-11-01 NOTE — CONSULT NOTE ADULT - ATTENDING COMMENTS
63 y/o M with PMHx of HTN, HLD, CAD (stents x4 on ASA/Plavix), AICD (4 years ago), kidney stones, with known left upper pole renal mass comes in for an elective Cystoscopy & Robotic Left Nephroureterectomy retroperitoneal lymph node dissection     FRANKLYN on CKD: Likely with some CKD in the setting of HTN. Creatinine in the 1.4 to 1.7 range.   Now with slight elevation, likely hemodynamically mediated due to surgery  Electrolytes stable. U/A is good  Maintain PO hydration  Hold ARB/Entresto for now. Can be started as an outpatient.   No NSAIDS    Rest per Dr. Mandie Covington MD  O: [phone]  Contact me on teams 61 y/o M with PMHx of HTN, HLD, CAD (stents x4 on ASA/Plavix), AICD (4 years ago), kidney stones, with known left upper pole renal mass comes in for an elective Cystoscopy & Robotic Left Nephroureterectomy retroperitoneal lymph node dissection     FRANKLYN on CKD: Likely with some CKD in the setting of HTN. Creatinine in the 1.4 to 1.7 range.   Now with slight elevation, likely hemodynamically mediated due to surgery  as well as loss of kidney   Electrolytes stable. U/A is good  Maintain PO hydration  Hold ARB/Entresto for now. Can be started as an outpatient.   No NSAIDS  Needs outpatient renal follow up     Rest per Dr. Mandie Covington MD  O: [phone]  Contact me on teams

## 2022-11-01 NOTE — PROGRESS NOTE ADULT - ASSESSMENT
62 year old male s/p left robotic nephroureterectomy, cystoscopy, RPLND    -labs   -clear liquid diet, advance with GI function  -f/u GI function  -f/u nephrology  -analgesia as needed  -OOB/DVT prophylaxis/incentive spirometry  - monitor vitals, orthostatics  - monitor urine output  - TOV

## 2022-11-01 NOTE — CONSULT NOTE ADULT - PROBLEM SELECTOR RECOMMENDATION 9
Mass on L kidney s/p elective Cystoscopy & Robotic Left Nephroureterectomy  SCr was 1.5-1.7 pre-op with SCr 1.7 on 10/25. SCr was 1.4 on admission 10/31 & 1.6 post-op.    UA with hematuria & trace proteins. Check urine electrolytes & spot urine TP/CR. Pt is non oliguric with 1L UOP.   SCr might stay around the same due to hyperfiltration by the other kidney but with eventual drop in eGFR. Monitor labs and urine output. Would hold home entresto for now. Avoid NSAIDs, ACEI/ARBS, RCA and nephrotoxins. Dose medications as per eGFR.    Upon discharge please make appointment with Nephrology clinic. For scheduling please email Nephrology at [email or web address]    If you have any questions, please feel free to contact me  Lizz Lockwood  Nephrology Fellow  Pager NS: [phone]/ [facility]: [phone]    (After 5 pm or on weekends please page the on-call fellow, can check [email or web address] for schedule. Login is rebeca hodgson, schedule under [facility] medicine, psych, derm)

## 2022-11-01 NOTE — CONSULT NOTE ADULT - SUBJECTIVE AND OBJECTIVE BOX
[facility] DIVISION OF KIDNEY DISEASES AND HYPERTENSION -- [phone]  -- INITIAL CONSULT NOTE  --------------------------------------------------------------------------------  HPI: 63 y/o M with PMHx of HTN, HLD, CAD (stents x4 on ASA/Plavix), AICD (4 years ago), kidney stones, with known left upper pole renal mass comes in for an elective Cystoscopy & Robotic Left Nephroureterectomy retroperitoneal lymph node dissection done on 10/31/22. SCr was 1.5-1.7 pre-op with SCr 1.7 on 10/25. SCr was 1.4 on admission 10/31 & 1.6 post-op.               Patient seen & examined. Denies chest pain, fever, chills, increased frequency, dysuria, hematuria, pus in urine, frothy urine, SOB, leg edema, loss of appetite, pruritis, N/V.      PAST HISTORY  --------------------------------------------------------------------------------  PAST MEDICAL & SURGICAL HISTORY:  HTN (Hypertension)      CAD (Coronary Artery Disease)      Coronary Stent  to LAD and D2 in 1/2011  proximal LCx, Distal LCx,  OM2, ramus intermedius, and diagonal stents      Hyperlipidemia      Stented coronary artery  x4      AICD (automatic cardioverter/defibrillator) present  4 years ago        FAMILY HISTORY:  Family history of heart disease (Mother)    Family history of prostate cancer in father      PAST SOCIAL HISTORY:    ALLERGIES & MEDICATIONS  --------------------------------------------------------------------------------  Allergies    No Known Allergies    Intolerances      Standing Inpatient Medications  aspirin enteric coated 81 milliGRAM(s) Oral daily  carvedilol 25 milliGRAM(s) Oral every 12 hours  dextrose 5% + sodium chloride 0.45%. 1000 milliLiter(s) IV Continuous <Continuous>  heparin   Injectable 5000 Unit(s) SubCutaneous every 8 hours  influenza   Vaccine 0.5 milliLiter(s) IntraMuscular once  pantoprazole    Tablet 40 milliGRAM(s) Oral before breakfast  simvastatin 40 milliGRAM(s) Oral at bedtime    PRN Inpatient Medications  acetaminophen     Tablet .. 650 milliGRAM(s) Oral every 6 hours PRN  acetaminophen   IVPB .. 1000 milliGRAM(s) IV Intermittent once PRN  oxyCODONE    IR 5 milliGRAM(s) Oral every 4 hours PRN  oxyCODONE    IR 10 milliGRAM(s) Oral every 4 hours PRN  senna 2 Tablet(s) Oral at bedtime PRN      REVIEW OF SYSTEMS  --------------------------------------------------------------------------------  Gen: No chills  Respiratory: No dyspnea, cough  CV: No chest pain  GI: No abdominal pain, diarrhea,  nausea, vomiting  : No increased frequency, dysuria, hematuria  MSK:  no edema  Neuro: No dizziness/lightheadedness    All other systems were reviewed and are negative, except as noted.    VITALS/PHYSICAL EXAM  --------------------------------------------------------------------------------  T(C): 37.1 (11-01-22 @ 13:10), Max: 37.1 (11-01-22 @ 13:10)  HR: 84 (11-01-22 @ 13:10) (72 - 92)  BP: 143/84 (11-01-22 @ 13:10) (122/69 - 160/90)  RR: 18 (11-01-22 @ 13:10) (14 - 18)  SpO2: 96% (11-01-22 @ 13:10) (94% - 100%)  Wt(kg): --  Height (cm): 170.2 (10-31-22 @ 08:48)  Weight (kg): 88.9 (10-31-22 @ 08:48)  BMI (kg/m2): 30.7 (10-31-22 @ 08:48)  BSA (m2): 2 (10-31-22 @ 08:48)      10-31-22 @ 07:01  -  11-01-22 @ 07:00  --------------------------------------------------------  IN: 2200 mL / OUT: 1460 mL / NET: 740 mL    11-01-22 @ 07:01  -  11-01-22 @ 16:08  --------------------------------------------------------  IN: 990 mL / OUT: 1000 mL / NET: -10 mL      Physical Exam:  	Gen: elderly, NAD  	HEENT: Anicteric, MMM, no JVD  	Pulm: CTA B/L  	CV: S1S2, no rub  	Abd: Soft, +BS, NT, ND           No presacral edema  	Ext: No LE edema B/L, no asterixis  	Neuro: Awake, alert  	Skin: Warm and dry  	Vascular access:    LABS/STUDIES  --------------------------------------------------------------------------------              9.3    6.67  >-----------<  271      [11-01-22 @ 07:33]              29.9     137  |  101  |  13  ----------------------------<  77      [11-01-22 @ 07:33]  3.9   |  24  |  1.69        Ca     8.2     [11-01-22 @ 07:33]            Creatinine Trend:  SCr 1.69 [11-01 @ 07:33]  SCr 1.49 [10-31 @ 14:08]  SCr 1.72 [10-25 @ 09:46]    Urinalysis - [10-01-22 @ 13:30]      Color Light Pink / Appearance Slightly Turbid / SG 1.012 / pH 6.5      Gluc Negative / Ketone Negative  / Bili Negative / Urobili <2 mg/dL       Blood Large / Protein Trace / Leuk Est Negative / Nitrite Negative       / WBC 4 / Hyaline 2 / Gran  / Sq Epi  / Non Sq Epi 0 / Bacteria Negative      HbA1c 5.8      [01-11-18 @ 05:50]       [facility] DIVISION OF KIDNEY DISEASES AND HYPERTENSION -- [phone]  -- INITIAL CONSULT NOTE  --------------------------------------------------------------------------------  HPI: 63 y/o M with PMHx of HTN, HLD, CAD (stents x4 on ASA/Plavix), AICD (4 years ago), kidney stones, with known left upper pole renal mass comes in for an elective Cystoscopy & Robotic Left Nephroureterectomy retroperitoneal lymph node dissection done on 10/31/22. SCr was 1.5-1.7 pre-op with SCr 1.7 on 10/25. SCr was 1.4 on admission 10/31 & 1.6 post-op.       Patient seen & examined. c/o pain at the surgical sites. No further gross hematuria. Denies chest pain, fever, chills,  dysuria, pus in urine, frothy urine, SOB, leg edema, N/V.      PAST HISTORY  --------------------------------------------------------------------------------  PAST MEDICAL & SURGICAL HISTORY:  HTN (Hypertension)      CAD (Coronary Artery Disease)      Coronary Stent  to LAD and D2 in 1/2011  proximal LCx, Distal LCx,  OM2, ramus intermedius, and diagonal stents      Hyperlipidemia      Stented coronary artery  x4      AICD (automatic cardioverter/defibrillator) present  4 years ago        FAMILY HISTORY:  Family history of heart disease (Mother)    Family history of prostate cancer in father      PAST SOCIAL HISTORY:    ALLERGIES & MEDICATIONS  --------------------------------------------------------------------------------  Allergies    No Known Allergies    Intolerances      Standing Inpatient Medications  aspirin enteric coated 81 milliGRAM(s) Oral daily  carvedilol 25 milliGRAM(s) Oral every 12 hours  dextrose 5% + sodium chloride 0.45%. 1000 milliLiter(s) IV Continuous <Continuous>  heparin   Injectable 5000 Unit(s) SubCutaneous every 8 hours  influenza   Vaccine 0.5 milliLiter(s) IntraMuscular once  pantoprazole    Tablet 40 milliGRAM(s) Oral before breakfast  simvastatin 40 milliGRAM(s) Oral at bedtime    PRN Inpatient Medications  acetaminophen     Tablet .. 650 milliGRAM(s) Oral every 6 hours PRN  acetaminophen   IVPB .. 1000 milliGRAM(s) IV Intermittent once PRN  oxyCODONE    IR 5 milliGRAM(s) Oral every 4 hours PRN  oxyCODONE    IR 10 milliGRAM(s) Oral every 4 hours PRN  senna 2 Tablet(s) Oral at bedtime PRN      REVIEW OF SYSTEMS  --------------------------------------------------------------------------------  Gen: No chills  Respiratory: No dyspnea, cough  CV: No chest pain  GI: No abdominal pain, diarrhea,  nausea, vomiting  : No increased frequency, dysuria, hematuria  MSK:  no edema  Neuro: No dizziness/lightheadedness    All other systems were reviewed and are negative, except as noted.    VITALS/PHYSICAL EXAM  --------------------------------------------------------------------------------  T(C): 37.1 (11-01-22 @ 13:10), Max: 37.1 (11-01-22 @ 13:10)  HR: 84 (11-01-22 @ 13:10) (72 - 92)  BP: 143/84 (11-01-22 @ 13:10) (122/69 - 160/90)  RR: 18 (11-01-22 @ 13:10) (14 - 18)  SpO2: 96% (11-01-22 @ 13:10) (94% - 100%)  Wt(kg): --  Height (cm): 170.2 (10-31-22 @ 08:48)  Weight (kg): 88.9 (10-31-22 @ 08:48)  BMI (kg/m2): 30.7 (10-31-22 @ 08:48)  BSA (m2): 2 (10-31-22 @ 08:48)      10-31-22 @ 07:01  -  11-01-22 @ 07:00  --------------------------------------------------------  IN: 2200 mL / OUT: 1460 mL / NET: 740 mL    11-01-22 @ 07:01  -  11-01-22 @ 16:08  --------------------------------------------------------  IN: 990 mL / OUT: 1000 mL / NET: -10 mL      Physical Exam:  	Gen: NAD  	HEENT: Anicteric, MMM, no JVD  	Pulm: CTA B/L  	CV: S1S2, no rub  	Abd: Soft, +BS, +multiple robotic surgical sites          No presacral edema  	Ext: No LE edema B/L  	Neuro: Awake, alert  	Skin: Warm and dry  	Vascular access:    LABS/STUDIES  --------------------------------------------------------------------------------              9.3    6.67  >-----------<  271      [11-01-22 @ 07:33]              29.9     137  |  101  |  13  ----------------------------<  77      [11-01-22 @ 07:33]  3.9   |  24  |  1.69        Ca     8.2     [11-01-22 @ 07:33]            Creatinine Trend:  SCr 1.69 [11-01 @ 07:33]  SCr 1.49 [10-31 @ 14:08]  SCr 1.72 [10-25 @ 09:46]    Urinalysis - [10-01-22 @ 13:30]      Color Light Pink / Appearance Slightly Turbid / SG 1.012 / pH 6.5      Gluc Negative / Ketone Negative  / Bili Negative / Urobili <2 mg/dL       Blood Large / Protein Trace / Leuk Est Negative / Nitrite Negative       / WBC 4 / Hyaline 2 / Gran  / Sq Epi  / Non Sq Epi 0 / Bacteria Negative      HbA1c 5.8      [01-11-18 @ 05:50]

## 2022-11-02 LAB
ALBUMIN SERPL ELPH-MCNC: 3.3 G/DL — SIGNIFICANT CHANGE UP (ref 3.3–5)
ALP SERPL-CCNC: 75 U/L — SIGNIFICANT CHANGE UP (ref 40–120)
ALT FLD-CCNC: 9 U/L — LOW (ref 10–45)
ANION GAP SERPL CALC-SCNC: 8 MMOL/L — SIGNIFICANT CHANGE UP (ref 5–17)
APPEARANCE UR: CLEAR — SIGNIFICANT CHANGE UP
AST SERPL-CCNC: 17 U/L — SIGNIFICANT CHANGE UP (ref 10–40)
BACTERIA # UR AUTO: NEGATIVE — SIGNIFICANT CHANGE UP
BILIRUB SERPL-MCNC: 0.3 MG/DL — SIGNIFICANT CHANGE UP (ref 0.2–1.2)
BILIRUB UR-MCNC: NEGATIVE — SIGNIFICANT CHANGE UP
BUN SERPL-MCNC: 12 MG/DL — SIGNIFICANT CHANGE UP (ref 7–23)
CALCIUM SERPL-MCNC: 8.2 MG/DL — LOW (ref 8.4–10.5)
CHLORIDE SERPL-SCNC: 102 MMOL/L — SIGNIFICANT CHANGE UP (ref 96–108)
CO2 SERPL-SCNC: 26 MMOL/L — SIGNIFICANT CHANGE UP (ref 22–31)
COLOR SPEC: SIGNIFICANT CHANGE UP
CREAT ?TM UR-MCNC: 101 MG/DL — SIGNIFICANT CHANGE UP
CREAT SERPL-MCNC: 1.85 MG/DL — HIGH (ref 0.5–1.3)
DIFF PNL FLD: ABNORMAL
EGFR: 41 ML/MIN/1.73M2 — LOW
EPI CELLS # UR: 0 /HPF — SIGNIFICANT CHANGE UP
GLUCOSE SERPL-MCNC: 99 MG/DL — SIGNIFICANT CHANGE UP (ref 70–99)
GLUCOSE UR QL: NEGATIVE — SIGNIFICANT CHANGE UP
HCT VFR BLD CALC: 26.4 % — LOW (ref 39–50)
HCT VFR BLD CALC: 26.7 % — LOW (ref 39–50)
HGB BLD-MCNC: 8.2 G/DL — LOW (ref 13–17)
HGB BLD-MCNC: 8.4 G/DL — LOW (ref 13–17)
KETONES UR-MCNC: NEGATIVE — SIGNIFICANT CHANGE UP
LEUKOCYTE ESTERASE UR-ACNC: NEGATIVE — SIGNIFICANT CHANGE UP
MCHC RBC-ENTMCNC: 25.5 PG — LOW (ref 27–34)
MCHC RBC-ENTMCNC: 25.7 PG — LOW (ref 27–34)
MCHC RBC-ENTMCNC: 31.1 GM/DL — LOW (ref 32–36)
MCHC RBC-ENTMCNC: 31.5 GM/DL — LOW (ref 32–36)
MCV RBC AUTO: 81.7 FL — SIGNIFICANT CHANGE UP (ref 80–100)
MCV RBC AUTO: 82 FL — SIGNIFICANT CHANGE UP (ref 80–100)
NITRITE UR-MCNC: NEGATIVE — SIGNIFICANT CHANGE UP
NRBC # BLD: 0 /100 WBCS — SIGNIFICANT CHANGE UP (ref 0–0)
NRBC # BLD: 0 /100 WBCS — SIGNIFICANT CHANGE UP (ref 0–0)
OSMOLALITY UR: 170 MOS/KG — LOW (ref 300–900)
PH UR: 6.5 — SIGNIFICANT CHANGE UP (ref 5–8)
PLATELET # BLD AUTO: 228 K/UL — SIGNIFICANT CHANGE UP (ref 150–400)
PLATELET # BLD AUTO: 247 K/UL — SIGNIFICANT CHANGE UP (ref 150–400)
POTASSIUM SERPL-MCNC: 3.9 MMOL/L — SIGNIFICANT CHANGE UP (ref 3.5–5.3)
POTASSIUM SERPL-SCNC: 3.9 MMOL/L — SIGNIFICANT CHANGE UP (ref 3.5–5.3)
POTASSIUM UR-SCNC: 20 MMOL/L — SIGNIFICANT CHANGE UP
PROT ?TM UR-MCNC: 83 MG/DL — HIGH (ref 0–12)
PROT SERPL-MCNC: 6.4 G/DL — SIGNIFICANT CHANGE UP (ref 6–8.3)
PROT UR-MCNC: SIGNIFICANT CHANGE UP
PROT/CREAT UR-RTO: 0.8 RATIO — HIGH (ref 0–0.2)
RBC # BLD: 3.22 M/UL — LOW (ref 4.2–5.8)
RBC # BLD: 3.27 M/UL — LOW (ref 4.2–5.8)
RBC # FLD: 14.5 % — SIGNIFICANT CHANGE UP (ref 10.3–14.5)
RBC # FLD: 14.6 % — HIGH (ref 10.3–14.5)
RBC CASTS # UR COMP ASSIST: 5 /HPF — HIGH (ref 0–4)
SODIUM SERPL-SCNC: 136 MMOL/L — SIGNIFICANT CHANGE UP (ref 135–145)
SODIUM UR-SCNC: 83 MMOL/L — SIGNIFICANT CHANGE UP
SP GR SPEC: 1.01 — LOW (ref 1.01–1.02)
UROBILINOGEN FLD QL: NEGATIVE — SIGNIFICANT CHANGE UP
UUN UR-MCNC: 226 MG/DL — SIGNIFICANT CHANGE UP
WBC # BLD: 5.75 K/UL — SIGNIFICANT CHANGE UP (ref 3.8–10.5)
WBC # BLD: 6.17 K/UL — SIGNIFICANT CHANGE UP (ref 3.8–10.5)
WBC # FLD AUTO: 5.75 K/UL — SIGNIFICANT CHANGE UP (ref 3.8–10.5)
WBC # FLD AUTO: 6.17 K/UL — SIGNIFICANT CHANGE UP (ref 3.8–10.5)
WBC UR QL: 1 /HPF — SIGNIFICANT CHANGE UP (ref 0–5)

## 2022-11-02 RX ORDER — LIDOCAINE 4 G/100G
1 CREAM TOPICAL
Refills: 0 | Status: DISCONTINUED | OUTPATIENT
Start: 2022-11-02 | End: 2022-11-03

## 2022-11-02 RX ORDER — HYDROCORTISONE 1 %
1 OINTMENT (GRAM) TOPICAL THREE TIMES A DAY
Refills: 0 | Status: DISCONTINUED | OUTPATIENT
Start: 2022-11-02 | End: 2022-11-03

## 2022-11-02 RX ADMIN — SIMVASTATIN 40 MILLIGRAM(S): 20 TABLET, FILM COATED ORAL at 21:12

## 2022-11-02 RX ADMIN — Medication 1 APPLICATION(S): at 21:14

## 2022-11-02 RX ADMIN — OXYCODONE HYDROCHLORIDE 10 MILLIGRAM(S): 5 TABLET ORAL at 01:56

## 2022-11-02 RX ADMIN — OXYCODONE HYDROCHLORIDE 5 MILLIGRAM(S): 5 TABLET ORAL at 14:06

## 2022-11-02 RX ADMIN — Medication 81 MILLIGRAM(S): at 12:00

## 2022-11-02 RX ADMIN — HEPARIN SODIUM 5000 UNIT(S): 5000 INJECTION INTRAVENOUS; SUBCUTANEOUS at 05:43

## 2022-11-02 RX ADMIN — HEPARIN SODIUM 5000 UNIT(S): 5000 INJECTION INTRAVENOUS; SUBCUTANEOUS at 14:09

## 2022-11-02 RX ADMIN — Medication 650 MILLIGRAM(S): at 12:00

## 2022-11-02 RX ADMIN — HEPARIN SODIUM 5000 UNIT(S): 5000 INJECTION INTRAVENOUS; SUBCUTANEOUS at 21:12

## 2022-11-02 RX ADMIN — Medication 1 APPLICATION(S): at 14:10

## 2022-11-02 RX ADMIN — LIDOCAINE 1 APPLICATION(S): 4 CREAM TOPICAL at 14:09

## 2022-11-02 RX ADMIN — OXYCODONE HYDROCHLORIDE 5 MILLIGRAM(S): 5 TABLET ORAL at 15:00

## 2022-11-02 RX ADMIN — Medication 650 MILLIGRAM(S): at 21:12

## 2022-11-02 RX ADMIN — OXYCODONE HYDROCHLORIDE 10 MILLIGRAM(S): 5 TABLET ORAL at 06:12

## 2022-11-02 RX ADMIN — OXYCODONE HYDROCHLORIDE 10 MILLIGRAM(S): 5 TABLET ORAL at 01:26

## 2022-11-02 RX ADMIN — PANTOPRAZOLE SODIUM 40 MILLIGRAM(S): 20 TABLET, DELAYED RELEASE ORAL at 05:41

## 2022-11-02 RX ADMIN — CARVEDILOL PHOSPHATE 25 MILLIGRAM(S): 80 CAPSULE, EXTENDED RELEASE ORAL at 05:42

## 2022-11-02 RX ADMIN — OXYCODONE HYDROCHLORIDE 10 MILLIGRAM(S): 5 TABLET ORAL at 05:42

## 2022-11-02 RX ADMIN — Medication 650 MILLIGRAM(S): at 12:55

## 2022-11-02 RX ADMIN — CARVEDILOL PHOSPHATE 25 MILLIGRAM(S): 80 CAPSULE, EXTENDED RELEASE ORAL at 17:19

## 2022-11-02 RX ADMIN — Medication 650 MILLIGRAM(S): at 21:35

## 2022-11-02 NOTE — PROGRESS NOTE ADULT - ASSESSMENT
62 year old male s/p left robotic nephroureterectomy, cystoscopy, RPLND, POD#2    -labs   -regular diet   -f/u GI function  -f/u nephrology, urine lytes   -analgesia as needed  -OOB/DVT prophylaxis/incentive spirometry  - d/c planning

## 2022-11-03 ENCOUNTER — TRANSCRIPTION ENCOUNTER (OUTPATIENT)
Age: 62
End: 2022-11-03

## 2022-11-03 VITALS
SYSTOLIC BLOOD PRESSURE: 146 MMHG | OXYGEN SATURATION: 98 % | DIASTOLIC BLOOD PRESSURE: 77 MMHG | HEART RATE: 86 BPM | TEMPERATURE: 98 F | RESPIRATION RATE: 18 BRPM

## 2022-11-03 LAB
ANION GAP SERPL CALC-SCNC: 11 MMOL/L — SIGNIFICANT CHANGE UP (ref 5–17)
BLD GP AB SCN SERPL QL: NEGATIVE — SIGNIFICANT CHANGE UP
BUN SERPL-MCNC: 9 MG/DL — SIGNIFICANT CHANGE UP (ref 7–23)
CALCIUM SERPL-MCNC: 8.6 MG/DL — SIGNIFICANT CHANGE UP (ref 8.4–10.5)
CHLORIDE SERPL-SCNC: 100 MMOL/L — SIGNIFICANT CHANGE UP (ref 96–108)
CO2 SERPL-SCNC: 24 MMOL/L — SIGNIFICANT CHANGE UP (ref 22–31)
CREAT SERPL-MCNC: 1.59 MG/DL — HIGH (ref 0.5–1.3)
EGFR: 49 ML/MIN/1.73M2 — LOW
GLUCOSE SERPL-MCNC: 90 MG/DL — SIGNIFICANT CHANGE UP (ref 70–99)
HCT VFR BLD CALC: 25.6 % — LOW (ref 39–50)
HGB BLD-MCNC: 8 G/DL — LOW (ref 13–17)
MCHC RBC-ENTMCNC: 25.5 PG — LOW (ref 27–34)
MCHC RBC-ENTMCNC: 31.3 GM/DL — LOW (ref 32–36)
MCV RBC AUTO: 81.5 FL — SIGNIFICANT CHANGE UP (ref 80–100)
NRBC # BLD: 0 /100 WBCS — SIGNIFICANT CHANGE UP (ref 0–0)
PLATELET # BLD AUTO: 248 K/UL — SIGNIFICANT CHANGE UP (ref 150–400)
POTASSIUM SERPL-MCNC: 3.9 MMOL/L — SIGNIFICANT CHANGE UP (ref 3.5–5.3)
POTASSIUM SERPL-SCNC: 3.9 MMOL/L — SIGNIFICANT CHANGE UP (ref 3.5–5.3)
RBC # BLD: 3.14 M/UL — LOW (ref 4.2–5.8)
RBC # FLD: 14.4 % — SIGNIFICANT CHANGE UP (ref 10.3–14.5)
RH IG SCN BLD-IMP: NEGATIVE — SIGNIFICANT CHANGE UP
SODIUM SERPL-SCNC: 135 MMOL/L — SIGNIFICANT CHANGE UP (ref 135–145)
WBC # BLD: 5.3 K/UL — SIGNIFICANT CHANGE UP (ref 3.8–10.5)
WBC # FLD AUTO: 5.3 K/UL — SIGNIFICANT CHANGE UP (ref 3.8–10.5)

## 2022-11-03 PROCEDURE — 84156 ASSAY OF PROTEIN URINE: CPT

## 2022-11-03 PROCEDURE — 85018 HEMOGLOBIN: CPT

## 2022-11-03 PROCEDURE — 84300 ASSAY OF URINE SODIUM: CPT

## 2022-11-03 PROCEDURE — 84540 ASSAY OF URINE/UREA-N: CPT

## 2022-11-03 PROCEDURE — 82565 ASSAY OF CREATININE: CPT

## 2022-11-03 PROCEDURE — 85014 HEMATOCRIT: CPT

## 2022-11-03 PROCEDURE — 86923 COMPATIBILITY TEST ELECTRIC: CPT

## 2022-11-03 PROCEDURE — 85025 COMPLETE CBC W/AUTO DIFF WBC: CPT

## 2022-11-03 PROCEDURE — 82435 ASSAY OF BLOOD CHLORIDE: CPT

## 2022-11-03 PROCEDURE — 81001 URINALYSIS AUTO W/SCOPE: CPT

## 2022-11-03 PROCEDURE — 80053 COMPREHEN METABOLIC PANEL: CPT

## 2022-11-03 PROCEDURE — 82570 ASSAY OF URINE CREATININE: CPT

## 2022-11-03 PROCEDURE — 85027 COMPLETE CBC AUTOMATED: CPT

## 2022-11-03 PROCEDURE — 84133 ASSAY OF URINE POTASSIUM: CPT

## 2022-11-03 PROCEDURE — 36415 COLL VENOUS BLD VENIPUNCTURE: CPT

## 2022-11-03 PROCEDURE — S2900: CPT

## 2022-11-03 PROCEDURE — 86900 BLOOD TYPING SEROLOGIC ABO: CPT

## 2022-11-03 PROCEDURE — 84132 ASSAY OF SERUM POTASSIUM: CPT

## 2022-11-03 PROCEDURE — 99232 SBSQ HOSP IP/OBS MODERATE 35: CPT | Mod: GC

## 2022-11-03 PROCEDURE — 86901 BLOOD TYPING SEROLOGIC RH(D): CPT

## 2022-11-03 PROCEDURE — 82947 ASSAY GLUCOSE BLOOD QUANT: CPT

## 2022-11-03 PROCEDURE — 83605 ASSAY OF LACTIC ACID: CPT

## 2022-11-03 PROCEDURE — 88307 TISSUE EXAM BY PATHOLOGIST: CPT

## 2022-11-03 PROCEDURE — 84295 ASSAY OF SERUM SODIUM: CPT

## 2022-11-03 PROCEDURE — C1889: CPT

## 2022-11-03 PROCEDURE — 82330 ASSAY OF CALCIUM: CPT

## 2022-11-03 PROCEDURE — 71045 X-RAY EXAM CHEST 1 VIEW: CPT

## 2022-11-03 PROCEDURE — 80048 BASIC METABOLIC PNL TOTAL CA: CPT

## 2022-11-03 PROCEDURE — 86850 RBC ANTIBODY SCREEN: CPT

## 2022-11-03 PROCEDURE — 82803 BLOOD GASES ANY COMBINATION: CPT

## 2022-11-03 PROCEDURE — 83935 ASSAY OF URINE OSMOLALITY: CPT

## 2022-11-03 RX ORDER — ACETAMINOPHEN 500 MG
2 TABLET ORAL
Qty: 0 | Refills: 0 | DISCHARGE
Start: 2022-11-03

## 2022-11-03 RX ORDER — SENNA PLUS 8.6 MG/1
2 TABLET ORAL
Qty: 0 | Refills: 0 | DISCHARGE
Start: 2022-11-03

## 2022-11-03 RX ORDER — OXYCODONE HYDROCHLORIDE 5 MG/1
1 TABLET ORAL
Qty: 12 | Refills: 0
Start: 2022-11-03 | End: 2022-11-05

## 2022-11-03 RX ADMIN — CARVEDILOL PHOSPHATE 25 MILLIGRAM(S): 80 CAPSULE, EXTENDED RELEASE ORAL at 04:53

## 2022-11-03 RX ADMIN — OXYCODONE HYDROCHLORIDE 10 MILLIGRAM(S): 5 TABLET ORAL at 04:41

## 2022-11-03 RX ADMIN — OXYCODONE HYDROCHLORIDE 10 MILLIGRAM(S): 5 TABLET ORAL at 04:17

## 2022-11-03 RX ADMIN — Medication 1 APPLICATION(S): at 04:54

## 2022-11-03 RX ADMIN — PANTOPRAZOLE SODIUM 40 MILLIGRAM(S): 20 TABLET, DELAYED RELEASE ORAL at 04:53

## 2022-11-03 RX ADMIN — OXYCODONE HYDROCHLORIDE 10 MILLIGRAM(S): 5 TABLET ORAL at 00:14

## 2022-11-03 RX ADMIN — OXYCODONE HYDROCHLORIDE 10 MILLIGRAM(S): 5 TABLET ORAL at 01:00

## 2022-11-03 RX ADMIN — HEPARIN SODIUM 5000 UNIT(S): 5000 INJECTION INTRAVENOUS; SUBCUTANEOUS at 04:54

## 2022-11-03 RX ADMIN — OXYCODONE HYDROCHLORIDE 10 MILLIGRAM(S): 5 TABLET ORAL at 09:50

## 2022-11-03 RX ADMIN — OXYCODONE HYDROCHLORIDE 10 MILLIGRAM(S): 5 TABLET ORAL at 10:30

## 2022-11-03 NOTE — DISCHARGE NOTE PROVIDER - NSDCMRMEDTOKEN_GEN_ALL_CORE_FT
acetaminophen 325 mg oral tablet: 2 tab(s) orally every 6 hours, As needed, Mild Pain (1 - 3), Moderate Pain (4 - 6)  aspirin 81 mg oral delayed release tablet: 1 tab(s) orally once a day  carvedilol 25 mg oral tablet: 1 tab(s) orally 2 times a day  clopidogrel 75 mg oral tablet: 1 tab(s) orally once a day  Entresto 49 mg-51 mg oral tablet: 1 tab(s) orally 2 times a day  omeprazole 40 mg oral delayed release capsule: 1 cap(s) orally once a day  oxyCODONE 5 mg oral tablet: 1 tab(s) orally every 6 hours, As Needed -Moderate Pain (4 - 6) MDD:6tabs  senna leaf extract oral tablet: 2 tab(s) orally once a day (at bedtime), As needed, Constipation  simvastatin 40 mg oral tablet: 1 tab(s) orally once a day (at bedtime)

## 2022-11-03 NOTE — DISCHARGE NOTE PROVIDER - NSDCCPCAREPLAN_GEN_ALL_CORE_FT
PRINCIPAL DISCHARGE DIAGNOSIS  Diagnosis: Mass of kidney  Assessment and Plan of Treatment: Call the office if you have fever greater than 101, difficulty urinating, pain not relieved with pain medication, nausea/vomiting.   recommend colace/senna while taking narcotic pain medication to avoid constipation. Do not drive while taking narcotic pain medication      SECONDARY DISCHARGE DIAGNOSES  Diagnosis: Chronic kidney disease, unspecified CKD stage  Assessment and Plan of Treatment: follow up with nephrology outpatient    Diagnosis: CAD (coronary atherosclerotic disease)  Assessment and Plan of Treatment: continue aspirin, HOLD plavix for 5 days, you may RESTART on 11/8    Diagnosis: HTN (hypertension)  Assessment and Plan of Treatment: continue all home meds    Diagnosis: DM (diabetes mellitus)  Assessment and Plan of Treatment: continue all home meds

## 2022-11-03 NOTE — DISCHARGE NOTE PROVIDER - HOSPITAL COURSE
61yo male admitted 10/31 s/p scheduled robotic left nephroureterectomy , post op with evans . on ancef 24hour.   POD#1- passed tov  POD#2- lightheaded, orthostatics normal, trending H/H  POD#3- H/H stable, pt feeling well. tolerating regular diet. pain controlled.   AVSS, hemodynamically stable. restart plavix in 5 days, cont asa, no more abx

## 2022-11-03 NOTE — PROGRESS NOTE ADULT - SUBJECTIVE AND OBJECTIVE BOX
UROLOGY PA PROGRESS NOTE:     Subjective:  no acute events overnight. feeling well. pain controlled, tolerating diet, +BM yesterday     Objective:  Vital signs  T(F): , Max: 99 (11-01-22 @ 17:35)  HR: 84 (11-02-22 @ 05:27)  BP: 119/75 (11-02-22 @ 05:27)  SpO2: 96% (11-02-22 @ 05:27)  Wt(kg): --    Output     11-01 @ 07:01  -  11-02 @ 07:00  --------------------------------------------------------  IN: 2865 mL / OUT: 2100 mL / NET: 765 mL    void 700cc     Physical Exam:  Gen: NAD  Abd: soft, NT/ND, appropriate TTP   : voiding , mild irritation noted around urethral meatus     Labs:  11-02  6.17  / 26.7  /x      11-01  6.67  / 29.9  /1.69                           8.4    6.17  )-----------( 247      ( 02 Nov 2022 07:26 )             26.7     11-01    137  |  101  |  13  ----------------------------<  77  3.9   |  24  |  1.69<H>    Ca    8.2<L>      01 Nov 2022 07:33            
UROLOGY DAILY PROGRESS NOTE:     Subjective: Patient seen and examined at bedside. Pain controlled.  c/o some dizziness       Objective:  Vital signs  T(F): , Max: 98.2 (11-01-22 @ 00:10)  HR: 81 (11-01-22 @ 05:15)  BP: 160/90 (11-01-22 @ 05:15)  SpO2: 98% (11-01-22 @ 05:15)  Wt(kg): --    I&O's Summary    31 Oct 2022 07:01  -  01 Nov 2022 07:00  --------------------------------------------------------  IN: 1125 mL / OUT: 1360 mL / NET: -235 mL    I&O's Detail    31 Oct 2022 07:01  -  01 Nov 2022 07:00  --------------------------------------------------------  IN:    Lactated Ringers: 1125 mL  Total IN: 1125 mL    OUT:    Indwelling Catheter - Urethral (mL): 1360 mL  Total OUT: 1360 mL    Total NET: -235 mL          Gen: NAD  Pulm: No respiratory distress, no subcostal retractions  CV: RRR, no JVD  Abd: Soft, NT, mildly distended, incision CDI  : Louis- clear urine     Labs:  10-31  11.05 / 27.7  /1.49                           9.0    11.05 )-----------( 261      ( 31 Oct 2022 14:08 )             27.7     10-31    139  |  105  |  12  ----------------------------<  107<H>  4.5   |  23  |  1.49<H>    Ca    7.6<L>      31 Oct 2022 14:08          Urine Cx:  
UROLOGY PA PROGRESS NOTE:     Subjective:  no acute events overnight, feeling well, ambulating, passing gas, tolerating diet, pain controlled. no longer having dizziness     Objective:  Vital signs  T(F): , Max: 98.7 (22 @ 21:14)  HR: 80 (22 @ 05:21)  BP: 149/83 (22 @ 05:21)  SpO2: 94% (22 @ 05:21)  Wt(kg): --    Output      @ 07:01  -   @ 07:00  --------------------------------------------------------  IN: 720 mL / OUT: 1300 mL / NET: -580 mL    void 250cc overnight    Physical Exam:  Gen: NAD  Abd: soft, NT/ND, incisions CDI  : voiding     Labs:    5.75  / 26.4  /x        6.17  / 26.7  /x                              8.2    5.75  )-----------( 228      ( 2022 13:50 )             26.4         136  |  102  |  12  ----------------------------<  99  3.9   |  26  |  1.85<H>    Ca    8.2<L>      2022 07:23    TPro  6.4  /  Alb  3.3  /  TBili  0.3  /  DBili  x   /  AST  17  /  ALT  9<L>  /  AlkPhos  75        Urinalysis Basic - ( 2022 14:45 )    Color: Light Yellow / Appearance: Clear / S.007 / pH: x  Gluc: x / Ketone: Negative  / Bili: Negative / Urobili: Negative   Blood: x / Protein: Trace / Nitrite: Negative   Leuk Esterase: Negative / RBC: 5 /hpf / WBC 1 /HPF   Sq Epi: x / Non Sq Epi: 0 /hpf / Bacteria: Negative        
The patient was seen and examined at bedside.  States that he is having pain mainly in the left lower abdomen and near the suprapubic incision.  Denies complaints of chest pain, shortness of breath, nausea.    T(C): 36.6 (10-31-22 @ 13:36), Max: 37 (10-31-22 @ 05:25)  HR: 89 (10-31-22 @ 15:30) (78 - 91)  BP: 139/77 (10-31-22 @ 15:30) (126/68 - 141/77)  RR: 16 (10-31-22 @ 15:30) (13 - 18)  SpO2: 100% (10-31-22 @ 15:30) (97% - 100%)  Wt(kg): --    Physical Exam:    General: NAD, A+Ox3  Abdomen: soft, non-tender, non-distended      F - mainly clear output, unrecorded volume                            9.0    11.05 )-----------( 261      ( 31 Oct 2022 14:08 )             27.7       139  |  105  |  12  ----------------------------<  107<H>  4.5   |  23  |  1.49<H>    Ca    7.6<L>      31 Oct 2022 14:08

## 2022-11-03 NOTE — DISCHARGE NOTE PROVIDER - CARE PROVIDER_API CALL
Anton Salazar)  Urology  [address] [place] [address]  Phone: [phone]  Fax: [phone]  Follow Up Time:

## 2022-11-03 NOTE — DISCHARGE NOTE NURSING/CASE MANAGEMENT/SOCIAL WORK - PATIENT PORTAL LINK FT
You can access the FollowMyHealth Patient Portal offered by [facility] by registering at the following website: [email or web address]. By joining [employer]’s FollowMyHealth portal, you will also be able to view your health information using other applications (apps) compatible with our system.

## 2022-11-03 NOTE — DISCHARGE NOTE PROVIDER - NSDCCPTREATMENT_GEN_ALL_CORE_FT
PRINCIPAL PROCEDURE  Procedure: Nephroureterectomy, robot-assisted, laparoscopic, with cystoscopy  Findings and Treatment:

## 2022-11-03 NOTE — PROGRESS NOTE ADULT - ASSESSMENT
62 year old male s/p left robotic nephroureterectomy, cystoscopy, RPLND, POD#3    - f/u am labs  - plavix on hold   -analgesia as needed  -OOB/DVT prophylaxis/incentive spirometry  - d/c planning

## 2022-11-06 LAB — SURGICAL PATHOLOGY STUDY: SIGNIFICANT CHANGE UP

## 2022-11-06 NOTE — HISTORY OF PRESENT ILLNESS
[Hematuria - Gross] : gross hematuria [FreeTextEntry1] : : Aug 27 1960 \par Referring Provider: none \par \par HPI: Mr. YESI ESPAÑA is a 62 year yo M with a PMHx notable for HTN, CAD on ASA/Plavix. He has history of kidney stones. He presented to the emergency department at [facility] on  with left flank pain and gross hematuria. A non contrast CT demonstrated an 8 cm left upper pole renal mass, 1 cm para-aortic LN. There were also two parenchymal nodules in the left lower lobe of the lung. He was discharged to home. No longer with flank pain, hematuria. He smoked for ~10 years, approx 1 pack per week but quit 30 years ago. He is a former  but now retired.  No headaches / vision changes, no bony pain. \par \par CT chest with two pulmonary nodules 9 mm and 6 mm stable in appearance. \par \par He saw his cardiologist and demonstrated nuclear stress test with EF 51% and no evidence of ischmeia. He was told to stop his plavix 1 week prior to OR.\par \par Anticoagulation: Aspirin / plavix\par All: NKDA\par Social: Occasional EtOH. Smoked for ~10 years, 1 pack / week. Quit ~30 years ago. He is on disability due to his medical comorbidities\par PMHx: CAD s/p PCI , AICD \par PSHx: No abdominal surgeries\par FHx: Father with prostate -  of prostate cancer. He was 88. No family history of kidney cancer\par Labs: \par 10/1/2022:  Creatinine 1.54; Hemoglobin: 12.8; Platelet count: 316\par AST: 14; ALT: 12; Calcium: 8.9\par \par Imaging: CT Urogram and CT chest in PACS with large L upper pole mass with possible adherence to the spleen.\par  [Urinary Incontinence] : no urinary incontinence [Urinary Retention] : no urinary retention [Urinary Urgency] : no urinary urgency [Urinary Frequency] : no urinary frequency

## 2022-11-06 NOTE — ASSESSMENT
[FreeTextEntry1] : 61 yo M with large L upper pole renal mass with abuttment of the spleen, stable pulmonary nodules and gross hematuria

## 2022-11-14 ENCOUNTER — APPOINTMENT (OUTPATIENT)
Dept: UROLOGY | Facility: CLINIC | Age: 62
End: 2022-11-14

## 2022-11-14 LAB
ANION GAP SERPL CALC-SCNC: 12 MMOL/L
BUN SERPL-MCNC: 23 MG/DL
CALCIUM SERPL-MCNC: 9.6 MG/DL
CHLORIDE SERPL-SCNC: 103 MMOL/L
CO2 SERPL-SCNC: 23 MMOL/L
CREAT SERPL-MCNC: 1.8 MG/DL
EGFR: 42 ML/MIN/1.73M2
GLUCOSE SERPL-MCNC: 91 MG/DL
POTASSIUM SERPL-SCNC: 5.5 MMOL/L
SODIUM SERPL-SCNC: 138 MMOL/L

## 2022-11-14 PROCEDURE — 99024 POSTOP FOLLOW-UP VISIT: CPT

## 2022-11-14 NOTE — PHYSICAL EXAM
[General Appearance - Well Developed] : well developed [General Appearance - Well Nourished] : well nourished [Bowel Sounds] : normal bowel sounds [Abdomen Soft] : soft [FreeTextEntry1] : incisions c/d/i [Skin Color & Pigmentation] : normal skin color and pigmentation [Skin Turgor] : supple [Heart Rate And Rhythm] : Heart rate and rhythm were normal [] : no respiratory distress [Respiration, Rhythm And Depth] : normal respiratory rhythm and effort [Oriented To Time, Place, And Person] : oriented to person, place, and time [Not Anxious] : not anxious [Normal Station and Gait] : the gait and station were normal for the patient's age [No Focal Deficits] : no focal deficits

## 2022-11-14 NOTE — HISTORY OF PRESENT ILLNESS
[FreeTextEntry1] : : Aug 27 1960 \par Referring Provider: none \par \par HPI: Mr. YESI ESPAÑA is a 62 year yo M with a PMHx notable for HTN, CAD on ASA/Plavix. He has history of kidney stones. He presented to the emergency department at [facility] on  with left flank pain and gross hematuria. A non contrast CT demonstrated an 8 cm left upper pole renal mass, 1 cm para-aortic LN. There were also two parenchymal nodules in the left lower lobe of the lung. He was discharged to home. No longer with flank pain, hematuria. He smoked for ~10 years, approx 1 pack per week but quit 30 years ago. He is a former  but now retired.  No headaches / vision changes, no bony pain. \par \par CT chest with two pulmonary nodules 9 mm and 6 mm stable in appearance. \par \par He saw his cardiologist and demonstrated nuclear stress test with EF 51% and no evidence of ischmeia. He was told to stop his plavix 1 week prior to OR.\par \par Anticoagulation: Aspirin / plavix\par All: NKDA\par Social: Occasional EtOH. Smoked for ~10 years, 1 pack / week. Quit ~30 years ago. He is on disability due to his medical comorbidities\par PMHx: CAD s/p PCI , AICD \par PSHx: No abdominal surgeries\par FHx: Father with prostate -  of prostate cancer. He was 88. No family history of kidney cancer\par Labs: \par 10/1/2022:  Creatinine 1.54; Hemoglobin: 12.8; Platelet count: 316\par AST: 14; ALT: 12; Calcium: 8.9\par \par Imaging: CT Urogram and CT chest in PACS with large L upper pole mass with possible adherence to the spleen.\par \par :\par HEre today for follow up s/p RAL L Nx, adrenalectomy and RPLND. Pathology with pT3a ccRCC G2 N0 (0), R0 resection. Unremarkable adrenal gland. Doing well, occasional L testicular pain, no fevers/chills. Passing flatus and having normal bowel movements. We discussed he is at higher risk for recurrence. \par  [Urinary Incontinence] : no urinary incontinence [Urinary Retention] : no urinary retention [Urinary Urgency] : no urinary urgency [Urinary Frequency] : no urinary frequency [Hematuria - Gross] : no gross hematuria

## 2022-11-17 ENCOUNTER — OUTPATIENT (OUTPATIENT)
Dept: OUTPATIENT SERVICES | Facility: HOSPITAL | Age: 62
LOS: 1 days | Discharge: HOME | End: 2022-11-17

## 2022-11-17 DIAGNOSIS — Z95.810 PRESENCE OF AUTOMATIC (IMPLANTABLE) CARDIAC DEFIBRILLATOR: Chronic | ICD-10-CM

## 2022-11-17 DIAGNOSIS — C64.1 MALIGNANT NEOPLASM OF RIGHT KIDNEY, EXCEPT RENAL PELVIS: ICD-10-CM

## 2022-11-23 ENCOUNTER — APPOINTMENT (OUTPATIENT)
Dept: HEMATOLOGY ONCOLOGY | Facility: CLINIC | Age: 62
End: 2022-11-23
Payer: MEDICARE

## 2022-11-23 ENCOUNTER — RESULT REVIEW (OUTPATIENT)
Age: 62
End: 2022-11-23

## 2022-11-23 ENCOUNTER — NON-APPOINTMENT (OUTPATIENT)
Age: 62
End: 2022-11-23

## 2022-11-23 VITALS
HEART RATE: 67 BPM | TEMPERATURE: 98.1 F | BODY MASS INDEX: 31.53 KG/M2 | DIASTOLIC BLOOD PRESSURE: 80 MMHG | SYSTOLIC BLOOD PRESSURE: 158 MMHG | WEIGHT: 196.21 LBS | HEIGHT: 66.14 IN | RESPIRATION RATE: 16 BRPM | OXYGEN SATURATION: 99 %

## 2022-11-23 DIAGNOSIS — Z00.00 ENCOUNTER FOR GENERAL ADULT MEDICAL EXAMINATION W/OUT ABNORMAL FINDINGS: ICD-10-CM

## 2022-11-23 DIAGNOSIS — Z95.810 PRESENCE OF AUTOMATIC (IMPLANTABLE) CARDIAC DEFIBRILLATOR: ICD-10-CM

## 2022-11-23 DIAGNOSIS — Z86.79 PERSONAL HISTORY OF OTHER DISEASES OF THE CIRCULATORY SYSTEM: ICD-10-CM

## 2022-11-23 LAB
BASOPHILS # BLD AUTO: 0.04 K/UL — SIGNIFICANT CHANGE UP (ref 0–0.2)
BASOPHILS NFR BLD AUTO: 0.7 % — SIGNIFICANT CHANGE UP (ref 0–2)
EOSINOPHIL # BLD AUTO: 0.3 K/UL — SIGNIFICANT CHANGE UP (ref 0–0.5)
EOSINOPHIL NFR BLD AUTO: 5.4 % — SIGNIFICANT CHANGE UP (ref 0–6)
HCT VFR BLD CALC: 34.9 % — LOW (ref 39–50)
HGB BLD-MCNC: 11.1 G/DL — LOW (ref 13–17)
IMM GRANULOCYTES NFR BLD AUTO: 0.2 % — SIGNIFICANT CHANGE UP (ref 0–0.9)
LYMPHOCYTES # BLD AUTO: 1.33 K/UL — SIGNIFICANT CHANGE UP (ref 1–3.3)
LYMPHOCYTES # BLD AUTO: 23.8 % — SIGNIFICANT CHANGE UP (ref 13–44)
MCHC RBC-ENTMCNC: 25.8 PG — LOW (ref 27–34)
MCHC RBC-ENTMCNC: 31.8 G/DL — LOW (ref 32–36)
MCV RBC AUTO: 81 FL — SIGNIFICANT CHANGE UP (ref 80–100)
MONOCYTES # BLD AUTO: 0.46 K/UL — SIGNIFICANT CHANGE UP (ref 0–0.9)
MONOCYTES NFR BLD AUTO: 8.2 % — SIGNIFICANT CHANGE UP (ref 2–14)
NEUTROPHILS # BLD AUTO: 3.45 K/UL — SIGNIFICANT CHANGE UP (ref 1.8–7.4)
NEUTROPHILS NFR BLD AUTO: 61.7 % — SIGNIFICANT CHANGE UP (ref 43–77)
NRBC # BLD: 0 /100 WBCS — SIGNIFICANT CHANGE UP (ref 0–0)
PLATELET # BLD AUTO: 244 K/UL — SIGNIFICANT CHANGE UP (ref 150–400)
RBC # BLD: 4.31 M/UL — SIGNIFICANT CHANGE UP (ref 4.2–5.8)
RBC # FLD: 15.9 % — HIGH (ref 10.3–14.5)
WBC # BLD: 5.59 K/UL — SIGNIFICANT CHANGE UP (ref 3.8–10.5)
WBC # FLD AUTO: 5.59 K/UL — SIGNIFICANT CHANGE UP (ref 3.8–10.5)

## 2022-11-23 PROCEDURE — 99205 OFFICE O/P NEW HI 60 MIN: CPT

## 2022-11-23 RX ORDER — AMOXICILLIN AND CLAVULANATE POTASSIUM 875; 125 MG/1; MG/1
875-125 TABLET, COATED ORAL
Qty: 14 | Refills: 0 | Status: DISCONTINUED | COMMUNITY
Start: 2022-10-25 | End: 2022-11-23

## 2022-11-23 RX ORDER — OMEPRAZOLE 40 MG/1
40 CAPSULE, DELAYED RELEASE ORAL
Qty: 90 | Refills: 0 | Status: DISCONTINUED | COMMUNITY
Start: 2022-10-17

## 2022-11-25 DIAGNOSIS — D64.9 ANEMIA, UNSPECIFIED: ICD-10-CM

## 2022-11-25 LAB
ALBUMIN SERPL ELPH-MCNC: 4.4 G/DL
ALP BLD-CCNC: 100 U/L
ALT SERPL-CCNC: 19 U/L
ANION GAP SERPL CALC-SCNC: 11 MMOL/L
AST SERPL-CCNC: 16 U/L
BILIRUB SERPL-MCNC: 0.4 MG/DL
BUN SERPL-MCNC: 16 MG/DL
CALCIUM SERPL-MCNC: 9.2 MG/DL
CHLORIDE SERPL-SCNC: 105 MMOL/L
CO2 SERPL-SCNC: 25 MMOL/L
CREAT SERPL-MCNC: 1.53 MG/DL
EGFR: 51 ML/MIN/1.73M2
GLUCOSE SERPL-MCNC: 88 MG/DL
HBV CORE IGG+IGM SER QL: NONREACTIVE
HBV CORE IGM SER QL: NONREACTIVE
HBV E AB SER QL: NONREACTIVE
HBV E AG SER QL: NONREACTIVE
HBV SURFACE AB SER QL: NONREACTIVE
HBV SURFACE AG SER QL: NONREACTIVE
HCV AB SER QL: NONREACTIVE
HCV S/CO RATIO: 0.1 S/CO
POTASSIUM SERPL-SCNC: 4.5 MMOL/L
PROT SERPL-MCNC: 7.1 G/DL
SODIUM SERPL-SCNC: 140 MMOL/L

## 2022-11-27 NOTE — ASSESSMENT
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 CT chest revealed two subcentimeter nodules.\par \par I had a lengthy discussion with the patient regarding his cancer status and further management. He is likely to have stage III (pT3aN0). His two subcentimeter nodules in the LLL will be monitored and currently are too small to be characterized. Interval scans will be done in 3 months. He will be started on adjuvant keytruda for one year given his T3a disease with high risk of recurrence. Keynote 564 demonstrated adjuvant keytruda vs placebo significantly prolonged DFS. The potential AEs are not common, include but are not limited to fatigue, skin rash, arthritis, hypothyroidism, pneumonitis, hepatitis, nephritis, colitis, myocarditis and hypophysitis. He agreed to sign the consent. His many questions were answered in full to his satisfaction.

## 2022-11-27 NOTE — HISTORY OF PRESENT ILLNESS
[de-identified] : Zac Aldridge is a 62 years old male\par \par initially presented to the emergency department at [facility] on October 1 with left flank pain and left lower quadrant  and gross hematuria. \par \par 10/11/22 A non contrast CT abdomen and pelvis demonstrated an 8 cm left upper pole renal mass, 1 cm para-aortic LN. There were also two parenchymal nodules in the left lower lobe of the lung. CT chest revealed stable 6mm and 9mm left lower lung nodules.   \par \par 10/31/22 underwent RAL radical nephrectomy, adrenalectomy, and node dissection, ccRCC grade 2, T3a with involvement of perirenal fat, 17 nodes all negative, margins negative and LVI negative.\par \par His gross hematuria was resolved after surgery. \par \par Now denies left flank pain and LLQ pain, feels overall fine.   [de-identified] : ccRCC

## 2022-11-29 LAB
FERRITIN SERPL-MCNC: 44 NG/ML
IRON SATN MFR SERPL: 11 %
IRON SERPL-MCNC: 36 UG/DL
TIBC SERPL-MCNC: 335 UG/DL
UIBC SERPL-MCNC: 299 UG/DL

## 2022-12-12 ENCOUNTER — RESULT REVIEW (OUTPATIENT)
Age: 62
End: 2022-12-12

## 2022-12-12 ENCOUNTER — APPOINTMENT (OUTPATIENT)
Dept: INFUSION THERAPY | Facility: HOSPITAL | Age: 62
End: 2022-12-12

## 2022-12-12 LAB
ALBUMIN SERPL ELPH-MCNC: 4.6 G/DL — SIGNIFICANT CHANGE UP (ref 3.3–5)
ALP SERPL-CCNC: 95 U/L — SIGNIFICANT CHANGE UP (ref 40–120)
ALT FLD-CCNC: 14 U/L — SIGNIFICANT CHANGE UP (ref 10–45)
ANION GAP SERPL CALC-SCNC: 9 MMOL/L — SIGNIFICANT CHANGE UP (ref 5–17)
AST SERPL-CCNC: 17 U/L — SIGNIFICANT CHANGE UP (ref 10–40)
BASOPHILS # BLD AUTO: 0.04 K/UL — SIGNIFICANT CHANGE UP (ref 0–0.2)
BASOPHILS NFR BLD AUTO: 0.7 % — SIGNIFICANT CHANGE UP (ref 0–2)
BILIRUB SERPL-MCNC: 0.3 MG/DL — SIGNIFICANT CHANGE UP (ref 0.2–1.2)
BUN SERPL-MCNC: 16 MG/DL — SIGNIFICANT CHANGE UP (ref 7–23)
CALCIUM SERPL-MCNC: 9.5 MG/DL — SIGNIFICANT CHANGE UP (ref 8.4–10.5)
CHLORIDE SERPL-SCNC: 104 MMOL/L — SIGNIFICANT CHANGE UP (ref 96–108)
CO2 SERPL-SCNC: 22 MMOL/L — SIGNIFICANT CHANGE UP (ref 22–31)
CREAT SERPL-MCNC: 1.5 MG/DL — HIGH (ref 0.5–1.3)
EGFR: 52 ML/MIN/1.73M2 — LOW
EOSINOPHIL # BLD AUTO: 0.21 K/UL — SIGNIFICANT CHANGE UP (ref 0–0.5)
EOSINOPHIL NFR BLD AUTO: 3.9 % — SIGNIFICANT CHANGE UP (ref 0–6)
GLUCOSE SERPL-MCNC: 90 MG/DL — SIGNIFICANT CHANGE UP (ref 70–99)
HCT VFR BLD CALC: 39.6 % — SIGNIFICANT CHANGE UP (ref 39–50)
HGB BLD-MCNC: 12.8 G/DL — LOW (ref 13–17)
IMM GRANULOCYTES NFR BLD AUTO: 0.4 % — SIGNIFICANT CHANGE UP (ref 0–0.9)
LYMPHOCYTES # BLD AUTO: 1.44 K/UL — SIGNIFICANT CHANGE UP (ref 1–3.3)
LYMPHOCYTES # BLD AUTO: 26.6 % — SIGNIFICANT CHANGE UP (ref 13–44)
MCHC RBC-ENTMCNC: 26.3 PG — LOW (ref 27–34)
MCHC RBC-ENTMCNC: 32.3 G/DL — SIGNIFICANT CHANGE UP (ref 32–36)
MCV RBC AUTO: 81.3 FL — SIGNIFICANT CHANGE UP (ref 80–100)
MONOCYTES # BLD AUTO: 0.45 K/UL — SIGNIFICANT CHANGE UP (ref 0–0.9)
MONOCYTES NFR BLD AUTO: 8.3 % — SIGNIFICANT CHANGE UP (ref 2–14)
NEUTROPHILS # BLD AUTO: 3.25 K/UL — SIGNIFICANT CHANGE UP (ref 1.8–7.4)
NEUTROPHILS NFR BLD AUTO: 60.1 % — SIGNIFICANT CHANGE UP (ref 43–77)
NRBC # BLD: 0 /100 WBCS — SIGNIFICANT CHANGE UP (ref 0–0)
PLATELET # BLD AUTO: 220 K/UL — SIGNIFICANT CHANGE UP (ref 150–400)
POTASSIUM SERPL-MCNC: 4.8 MMOL/L — SIGNIFICANT CHANGE UP (ref 3.5–5.3)
POTASSIUM SERPL-SCNC: 4.8 MMOL/L — SIGNIFICANT CHANGE UP (ref 3.5–5.3)
PROT SERPL-MCNC: 7 G/DL — SIGNIFICANT CHANGE UP (ref 6–8.3)
RBC # BLD: 4.87 M/UL — SIGNIFICANT CHANGE UP (ref 4.2–5.8)
RBC # FLD: 16.1 % — HIGH (ref 10.3–14.5)
SODIUM SERPL-SCNC: 136 MMOL/L — SIGNIFICANT CHANGE UP (ref 135–145)
T4 FREE SERPL-MCNC: 1 NG/DL — SIGNIFICANT CHANGE UP (ref 0.9–1.8)
TSH SERPL-MCNC: 1.41 UIU/ML — SIGNIFICANT CHANGE UP (ref 0.27–4.2)
WBC # BLD: 5.41 K/UL — SIGNIFICANT CHANGE UP (ref 3.8–10.5)
WBC # FLD AUTO: 5.41 K/UL — SIGNIFICANT CHANGE UP (ref 3.8–10.5)

## 2022-12-12 RX ORDER — SIMVASTATIN 20 MG/1
1 TABLET, FILM COATED ORAL
Qty: 0 | Refills: 0 | DISCHARGE

## 2022-12-12 RX ORDER — OMEPRAZOLE 10 MG/1
1 CAPSULE, DELAYED RELEASE ORAL
Qty: 0 | Refills: 0 | DISCHARGE

## 2022-12-13 ENCOUNTER — NON-APPOINTMENT (OUTPATIENT)
Age: 62
End: 2022-12-13

## 2022-12-13 DIAGNOSIS — Z51.11 ENCOUNTER FOR ANTINEOPLASTIC CHEMOTHERAPY: ICD-10-CM

## 2022-12-22 ENCOUNTER — APPOINTMENT (OUTPATIENT)
Dept: NEPHROLOGY | Facility: CLINIC | Age: 62
End: 2022-12-22
Payer: MEDICARE

## 2022-12-22 VITALS
SYSTOLIC BLOOD PRESSURE: 154 MMHG | DIASTOLIC BLOOD PRESSURE: 84 MMHG | OXYGEN SATURATION: 98 % | WEIGHT: 194 LBS | HEIGHT: 66 IN | HEART RATE: 69 BPM | TEMPERATURE: 97.7 F | BODY MASS INDEX: 31.18 KG/M2

## 2022-12-22 DIAGNOSIS — Z87.891 PERSONAL HISTORY OF NICOTINE DEPENDENCE: ICD-10-CM

## 2022-12-22 DIAGNOSIS — Z80.9 FAMILY HISTORY OF MALIGNANT NEOPLASM, UNSPECIFIED: ICD-10-CM

## 2022-12-22 DIAGNOSIS — Z78.9 OTHER SPECIFIED HEALTH STATUS: ICD-10-CM

## 2022-12-22 DIAGNOSIS — Z86.39 PERSONAL HISTORY OF OTHER ENDOCRINE, NUTRITIONAL AND METABOLIC DISEASE: ICD-10-CM

## 2022-12-22 PROCEDURE — 99214 OFFICE O/P EST MOD 30 MIN: CPT

## 2022-12-22 RX ORDER — OXYCODONE 5 MG/1
5 TABLET ORAL
Qty: 12 | Refills: 0 | Status: DISCONTINUED | COMMUNITY
Start: 2022-11-03 | End: 2022-12-22

## 2022-12-22 NOTE — REVIEW OF SYSTEMS
[Recent Weight Loss (___ Lbs)] : recent [unfilled] ~Ulb weight loss [Easy Bleeding] : a tendency for easy bleeding [Easy Bruising] : a tendency for easy bruising [Negative] : Endocrine

## 2022-12-23 LAB
APPEARANCE: CLEAR
BACTERIA: NEGATIVE
BILIRUBIN URINE: NEGATIVE
BLOOD URINE: NEGATIVE
COLOR: YELLOW
CREAT SPEC-SCNC: 124 MG/DL
CREAT/PROT UR: 0.1 RATIO
GLUCOSE QUALITATIVE U: NEGATIVE
HYALINE CASTS: 1 /LPF
KETONES URINE: NEGATIVE
LEUKOCYTE ESTERASE URINE: NEGATIVE
MICROSCOPIC-UA: NORMAL
NITRITE URINE: NEGATIVE
PH URINE: 6
PROT UR-MCNC: 8 MG/DL
PROTEIN URINE: NORMAL
RED BLOOD CELLS URINE: 1 /HPF
SPECIFIC GRAVITY URINE: 1.02
SQUAMOUS EPITHELIAL CELLS: 0 /HPF
UROBILINOGEN URINE: NORMAL
WHITE BLOOD CELLS URINE: 1 /HPF

## 2022-12-23 NOTE — ASSESSMENT
[FreeTextEntry1] : Mr. Iqbal is a 61 y/o male with recently diagnosed renal cell ca here for post hospital visit. \par He was admitted to the hospital in October for  an elective Cystoscopy & Robotic Left Nephroureterectomy retroperitoneal lymph node dissection  for a left upper pole renal mass which was  done on 10/31/22. Prior to this, he had gross hematuria. Pathology was c/w RCC ( Stage 111). He was started on Keytruda on 12/5 which he is tolerating well. Also, with 2 small lung nodules in left lower lung which are currently being monitored\par \par \par \par CKD stage 3 /Solitary Kidney: Baseline creatinine ( 1.4-1.7 mg./dl) \par Possible due to long standing H/O HTN\par Will ask pathology to evaluate the non neoplastic tissue of the  nephrectomy sample\par Repeat UA is bland and no protienuria\par Discussed to avoid Contrast, PPI, NSAIDS, herbal supplements \par Maintain adequate hydration\par We discussed the adverse effects of ICI. He understands\par \par HTN: BP is slightly up but well controlled at home\par Maintain on the current regimen\par \par f/u in 2 months\par \par \par Total Time Spent today on encounter 39 minutes. >50% time spent in counseling and coordination of care and on addressing above medical conditions in assessment.\par All labs, imaging, consulting reports, and any relevant outside records including laboratory work personally reviewed in order to evaluate, manage, and coordinate care amongst providers\par \par

## 2022-12-23 NOTE — ASSESSMENT
[FreeTextEntry1] : Mr. Iqbal is a 63 y/o male with recently diagnosed renal cell ca here for post hospital visit. \par He was admitted to the hospital in October for  an elective Cystoscopy & Robotic Left Nephroureterectomy retroperitoneal lymph node dissection  for a left upper pole renal mass which was  done on 10/31/22. Prior to this, he had gross hematuria. Pathology was c/w RCC ( Stage 111). He was started on Keytruda on 12/5 which he is tolerating well. Also, with 2 small lung nodules in left lower lung which are currently being monitored\par \par \par \par CKD stage 3 /Solitary Kidney: Baseline creatinine ( 1.4-1.7 mg./dl) \par Possible due to long standing H/O HTN\par Will ask pathology to evaluate the non neoplastic tissue of the  nephrectomy sample\par Repeat UA is bland and no protienuria\par Discussed to avoid Contrast, PPI, NSAIDS, herbal supplements \par Maintain adequate hydration\par We discussed the adverse effects of ICI. He understands\par \par HTN: BP is slightly up but well controlled at home\par Maintain on the current regimen\par \par f/u in 2 months\par \par \par Total Time Spent today on encounter 39 minutes. >50% time spent in counseling and coordination of care and on addressing above medical conditions in assessment.\par All labs, imaging, consulting reports, and any relevant outside records including laboratory work personally reviewed in order to evaluate, manage, and coordinate care amongst providers\par \par

## 2022-12-23 NOTE — HISTORY OF PRESENT ILLNESS
[FreeTextEntry1] :  ID: [id number]\par \par Mr. Iqbal is a 63 y/o male with recently diagnosed renal cell ca here for post hospital visit. \par \par \par He was admitted to the hospital in October for  an elective Cystoscopy & Robotic Left Nephroureterectomy retroperitoneal lymph node dissection  for a left upper pole renal mass which was  done on 10/31/22. Prior to this, he had gross hematuria. Pathology was c/w RCC ( Stage 111). He was started on Keytruda on 12/5 which he is tolerating well. Also, with 2 small lung nodules in left lower lung which are currently being monitored\par \par His other medical history is significant for: HTN, Hyperlipidemia,  CAD (stents x4 on ASA/Plavix), AICD (4 years ago) and kidney stones.  \par \par Also, with CKD which was assumed to be secondary to HTN . Baseline creatinine in the  1.4-1.7 mg/dl range. Post surgery, he developed some FRANKLYN with peak creatinine of 1.8 which was assumed  to be hemodynamically mediated due to surgery. Recently his creatinine is down to his baseline in the 1.5 mg/dl. Proteinuria of 0.8 gms in the hospital \par \par  \par  \par \par Comes for a follow up visit today. Feels pretty good. Denies any lower extremity edema, SOB. No nausea or vomiting. Hydrating herself well. No recent infections or hospitalizations.\par \par \par   \par  \par  \par \par  \par

## 2022-12-23 NOTE — HISTORY OF PRESENT ILLNESS
[FreeTextEntry1] :  ID: [id number]\par \par Mr. Iqbal is a 63 y/o male with recently diagnosed renal cell ca here for post hospital visit. \par \par \par He was admitted to the hospital in October for  an elective Cystoscopy & Robotic Left Nephroureterectomy retroperitoneal lymph node dissection  for a left upper pole renal mass which was  done on 10/31/22. Prior to this, he had gross hematuria. Pathology was c/w RCC ( Stage 111). He was started on Keytruda on 12/5 which he is tolerating well. Also, with 2 small lung nodules in left lower lung which are currently being monitored\par \par His other medical history is significant for: HTN, Hyperlipidemia,  CAD (stents x4 on ASA/Plavix), AICD (4 years ago) and kidney stones.  \par \par Also, with CKD which was assumed to be secondary to HTN . Baseline creatinine in the  1.4-1.7 mg/dl range. Post surgery, he developed some FRANKLYN with peak creatinine of 1.8 which was assumed  to be hemodynamically mediated due to surgery. Recently his creatinine is down to his baseline in the 1.5 mg/dl. Proteinuria of 0.8 gms in the hospital \par \par  \par  \par \par Comes for a follow up visit today. Feels pretty good. Denies any lower extremity edema, SOB. No nausea or vomiting. Hydrating herself well. No recent infections or hospitalizations.\par \par \par   \par  \par  \par \par  \par

## 2022-12-23 NOTE — PHYSICAL EXAM
[General Appearance - Alert] : alert [General Appearance - In No Acute Distress] : in no acute distress [Neck Appearance] : the appearance of the neck was normal [Exaggerated Use Of Accessory Muscles For Inspiration] : no accessory muscle use [Auscultation Breath Sounds / Voice Sounds] : lungs were clear to auscultation bilaterally [Chest Palpation] : palpation of the chest revealed no abnormalities [Apical Impulse] : the apical impulse was normal [Heart Sounds] : normal S1 and S2 [Heart Sounds Gallop] : no gallops [Murmurs] : no murmurs [Heart Sounds Pericardial Friction Rub] : no pericardial rub [Bowel Sounds] : normal bowel sounds [Abdomen Tenderness] : non-tender [Abdomen Mass (___ Cm)] : no abdominal mass palpated [No CVA Tenderness] : no ~M costovertebral angle tenderness [Involuntary Movements] : no involuntary movements were seen [Skin Color & Pigmentation] : normal skin color and pigmentation [] : no rash [No Focal Deficits] : no focal deficits [Oriented To Time, Place, And Person] : oriented to person, place, and time [Impaired Insight] : insight and judgment were intact [Affect] : the affect was normal [FreeTextEntry1] : +incision scar

## 2022-12-30 ENCOUNTER — APPOINTMENT (OUTPATIENT)
Dept: HEMATOLOGY ONCOLOGY | Facility: CLINIC | Age: 62
End: 2022-12-30
Payer: MEDICARE

## 2022-12-30 VITALS
SYSTOLIC BLOOD PRESSURE: 124 MMHG | BODY MASS INDEX: 31.49 KG/M2 | WEIGHT: 195.11 LBS | OXYGEN SATURATION: 98 % | HEART RATE: 91 BPM | DIASTOLIC BLOOD PRESSURE: 82 MMHG | TEMPERATURE: 96.9 F | RESPIRATION RATE: 16 BRPM

## 2022-12-30 PROCEDURE — 99213 OFFICE O/P EST LOW 20 MIN: CPT

## 2022-12-30 NOTE — ASSESSMENT
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 CT chest revealed two subcentimeter nodules.\par \par I had a lengthy discussion with the patient regarding his cancer status and further management. He is likely to have stage III (pT3aN0). His two subcentimeter nodules in the LLL will be monitored and currently are too small to be characterized. Interval scans will be done in 3 months. He will be started on adjuvant keytruda for one year given his T3a disease with high risk of recurrence. Keynote 564 demonstrated adjuvant keytruda vs placebo significantly prolonged DFS. The potential AEs are not common, include but are not limited to fatigue, skin rash, arthritis, hypothyroidism, pneumonitis, hepatitis, nephritis, colitis, myocarditis and hypophysitis. \par \par Plan:\par -Now s/p cycle 1 on 12/12 with no issues. C2 will be on 1/3/22 \par -will plan for interval scans end of Jan/early Feb\par -will consider changing keytruda to q6 weeks after 3 months completed (patient prefers to be on q6 week dosing) \par -labs with treatment and will f/u \par \par \par

## 2022-12-30 NOTE — HISTORY OF PRESENT ILLNESS
[de-identified] : Zac Aldridge is a 62 years old male\par \par initially presented to the emergency department at [facility] on October 1 with left flank pain and left lower quadrant  and gross hematuria. \par \par 10/11/22 A non contrast CT abdomen and pelvis demonstrated an 8 cm left upper pole renal mass, 1 cm para-aortic LN. There were also two parenchymal nodules in the left lower lobe of the lung. CT chest revealed stable 6mm and 9mm left lower lung nodules.   \par \par 10/31/22 underwent RAL radical nephrectomy, adrenalectomy, and node dissection, ccRCC grade 2, T3a with involvement of perirenal fat, 17 nodes all negative, margins negative and LVI negative.\par \par His gross hematuria was resolved after surgery. \par \par Now denies left flank pain and LLQ pain, feels overall fine.   [de-identified] : ccRCC [de-identified] : 12/30/22: today he feels well. appetite and energy unchanged. no rash, joint pain, CP, abn pain, N/V/D. no difficulty or reaction to first treatment. he does have intermittent episodes where he feels that he can't catch his breath. this has been going on for over a year and is not associated with exertion or anxiety. he has no HUBBARD or CP with exertion. he reports that taking 2-3 deep breaths resolves the issue. told him to continue to monitor and let us know if it is worsening

## 2023-01-03 ENCOUNTER — RESULT REVIEW (OUTPATIENT)
Age: 63
End: 2023-01-03

## 2023-01-03 ENCOUNTER — APPOINTMENT (OUTPATIENT)
Dept: INFUSION THERAPY | Facility: HOSPITAL | Age: 63
End: 2023-01-03

## 2023-01-03 LAB
ALBUMIN SERPL ELPH-MCNC: 4.4 G/DL — SIGNIFICANT CHANGE UP (ref 3.3–5)
ALP SERPL-CCNC: 105 U/L — SIGNIFICANT CHANGE UP (ref 40–120)
ALT FLD-CCNC: 28 U/L — SIGNIFICANT CHANGE UP (ref 10–45)
ANION GAP SERPL CALC-SCNC: 14 MMOL/L — SIGNIFICANT CHANGE UP (ref 5–17)
AST SERPL-CCNC: 24 U/L — SIGNIFICANT CHANGE UP (ref 10–40)
BASOPHILS # BLD AUTO: 0.04 K/UL — SIGNIFICANT CHANGE UP (ref 0–0.2)
BASOPHILS NFR BLD AUTO: 0.7 % — SIGNIFICANT CHANGE UP (ref 0–2)
BILIRUB SERPL-MCNC: 0.5 MG/DL — SIGNIFICANT CHANGE UP (ref 0.2–1.2)
BUN SERPL-MCNC: 26 MG/DL — HIGH (ref 7–23)
CALCIUM SERPL-MCNC: 9.6 MG/DL — SIGNIFICANT CHANGE UP (ref 8.4–10.5)
CHLORIDE SERPL-SCNC: 102 MMOL/L — SIGNIFICANT CHANGE UP (ref 96–108)
CO2 SERPL-SCNC: 21 MMOL/L — LOW (ref 22–31)
CREAT SERPL-MCNC: 1.58 MG/DL — HIGH (ref 0.5–1.3)
EGFR: 49 ML/MIN/1.73M2 — LOW
EOSINOPHIL # BLD AUTO: 0.17 K/UL — SIGNIFICANT CHANGE UP (ref 0–0.5)
EOSINOPHIL NFR BLD AUTO: 2.8 % — SIGNIFICANT CHANGE UP (ref 0–6)
GLUCOSE SERPL-MCNC: 75 MG/DL — SIGNIFICANT CHANGE UP (ref 70–99)
HCT VFR BLD CALC: 40.1 % — SIGNIFICANT CHANGE UP (ref 39–50)
HGB BLD-MCNC: 13.2 G/DL — SIGNIFICANT CHANGE UP (ref 13–17)
IMM GRANULOCYTES NFR BLD AUTO: 0.2 % — SIGNIFICANT CHANGE UP (ref 0–0.9)
LYMPHOCYTES # BLD AUTO: 1.37 K/UL — SIGNIFICANT CHANGE UP (ref 1–3.3)
LYMPHOCYTES # BLD AUTO: 22.9 % — SIGNIFICANT CHANGE UP (ref 13–44)
MCHC RBC-ENTMCNC: 26.4 PG — LOW (ref 27–34)
MCHC RBC-ENTMCNC: 32.9 G/DL — SIGNIFICANT CHANGE UP (ref 32–36)
MCV RBC AUTO: 80.2 FL — SIGNIFICANT CHANGE UP (ref 80–100)
MONOCYTES # BLD AUTO: 0.82 K/UL — SIGNIFICANT CHANGE UP (ref 0–0.9)
MONOCYTES NFR BLD AUTO: 13.7 % — SIGNIFICANT CHANGE UP (ref 2–14)
NEUTROPHILS # BLD AUTO: 3.56 K/UL — SIGNIFICANT CHANGE UP (ref 1.8–7.4)
NEUTROPHILS NFR BLD AUTO: 59.7 % — SIGNIFICANT CHANGE UP (ref 43–77)
NRBC # BLD: 0 /100 WBCS — SIGNIFICANT CHANGE UP (ref 0–0)
PLATELET # BLD AUTO: 207 K/UL — SIGNIFICANT CHANGE UP (ref 150–400)
POTASSIUM SERPL-MCNC: 5.4 MMOL/L — HIGH (ref 3.5–5.3)
POTASSIUM SERPL-SCNC: 5.4 MMOL/L — HIGH (ref 3.5–5.3)
PROT SERPL-MCNC: 7.4 G/DL — SIGNIFICANT CHANGE UP (ref 6–8.3)
RBC # BLD: 5 M/UL — SIGNIFICANT CHANGE UP (ref 4.2–5.8)
RBC # FLD: 15.5 % — HIGH (ref 10.3–14.5)
SODIUM SERPL-SCNC: 137 MMOL/L — SIGNIFICANT CHANGE UP (ref 135–145)
T4 FREE SERPL-MCNC: 3.4 NG/DL — HIGH (ref 0.9–1.8)
TSH SERPL-MCNC: 0.02 UIU/ML — LOW (ref 0.27–4.2)
WBC # BLD: 5.97 K/UL — SIGNIFICANT CHANGE UP (ref 3.8–10.5)
WBC # FLD AUTO: 5.97 K/UL — SIGNIFICANT CHANGE UP (ref 3.8–10.5)

## 2023-01-17 ENCOUNTER — OUTPATIENT (OUTPATIENT)
Dept: OUTPATIENT SERVICES | Facility: HOSPITAL | Age: 63
LOS: 1 days | Discharge: HOME | End: 2023-01-17

## 2023-01-17 DIAGNOSIS — C64.1 MALIGNANT NEOPLASM OF RIGHT KIDNEY, EXCEPT RENAL PELVIS: ICD-10-CM

## 2023-01-17 DIAGNOSIS — Z95.810 PRESENCE OF AUTOMATIC (IMPLANTABLE) CARDIAC DEFIBRILLATOR: Chronic | ICD-10-CM

## 2023-01-23 ENCOUNTER — RESULT REVIEW (OUTPATIENT)
Age: 63
End: 2023-01-23

## 2023-01-23 ENCOUNTER — APPOINTMENT (OUTPATIENT)
Dept: HEMATOLOGY ONCOLOGY | Facility: CLINIC | Age: 63
End: 2023-01-23
Payer: MEDICARE

## 2023-01-23 ENCOUNTER — APPOINTMENT (OUTPATIENT)
Dept: INFUSION THERAPY | Facility: HOSPITAL | Age: 63
End: 2023-01-23

## 2023-01-23 VITALS
WEIGHT: 197.31 LBS | SYSTOLIC BLOOD PRESSURE: 123 MMHG | OXYGEN SATURATION: 99 % | BODY MASS INDEX: 31.85 KG/M2 | TEMPERATURE: 97.2 F | HEART RATE: 66 BPM | DIASTOLIC BLOOD PRESSURE: 79 MMHG | RESPIRATION RATE: 16 BRPM

## 2023-01-23 LAB
ALBUMIN SERPL ELPH-MCNC: 4.4 G/DL — SIGNIFICANT CHANGE UP (ref 3.3–5)
ALP SERPL-CCNC: 86 U/L — SIGNIFICANT CHANGE UP (ref 40–120)
ALT FLD-CCNC: 25 U/L — SIGNIFICANT CHANGE UP (ref 10–45)
ANION GAP SERPL CALC-SCNC: 19 MMOL/L — HIGH (ref 5–17)
AST SERPL-CCNC: 37 U/L — SIGNIFICANT CHANGE UP (ref 10–40)
BASOPHILS # BLD AUTO: 0.04 K/UL — SIGNIFICANT CHANGE UP (ref 0–0.2)
BASOPHILS NFR BLD AUTO: 0.6 % — SIGNIFICANT CHANGE UP (ref 0–2)
BILIRUB SERPL-MCNC: 0.3 MG/DL — SIGNIFICANT CHANGE UP (ref 0.2–1.2)
BUN SERPL-MCNC: 15 MG/DL — SIGNIFICANT CHANGE UP (ref 7–23)
CALCIUM SERPL-MCNC: 9.5 MG/DL — SIGNIFICANT CHANGE UP (ref 8.4–10.5)
CHLORIDE SERPL-SCNC: 100 MMOL/L — SIGNIFICANT CHANGE UP (ref 96–108)
CO2 SERPL-SCNC: 18 MMOL/L — LOW (ref 22–31)
CREAT SERPL-MCNC: 1.67 MG/DL — HIGH (ref 0.5–1.3)
EGFR: 46 ML/MIN/1.73M2 — LOW
EOSINOPHIL # BLD AUTO: 0.23 K/UL — SIGNIFICANT CHANGE UP (ref 0–0.5)
EOSINOPHIL NFR BLD AUTO: 3.4 % — SIGNIFICANT CHANGE UP (ref 0–6)
GLUCOSE SERPL-MCNC: 86 MG/DL — SIGNIFICANT CHANGE UP (ref 70–99)
HCT VFR BLD CALC: 41.7 % — SIGNIFICANT CHANGE UP (ref 39–50)
HGB BLD-MCNC: 13.7 G/DL — SIGNIFICANT CHANGE UP (ref 13–17)
IMM GRANULOCYTES NFR BLD AUTO: 0.3 % — SIGNIFICANT CHANGE UP (ref 0–0.9)
LYMPHOCYTES # BLD AUTO: 1.62 K/UL — SIGNIFICANT CHANGE UP (ref 1–3.3)
LYMPHOCYTES # BLD AUTO: 23.8 % — SIGNIFICANT CHANGE UP (ref 13–44)
MCHC RBC-ENTMCNC: 26.4 PG — LOW (ref 27–34)
MCHC RBC-ENTMCNC: 32.9 G/DL — SIGNIFICANT CHANGE UP (ref 32–36)
MCV RBC AUTO: 80.3 FL — SIGNIFICANT CHANGE UP (ref 80–100)
MONOCYTES # BLD AUTO: 0.51 K/UL — SIGNIFICANT CHANGE UP (ref 0–0.9)
MONOCYTES NFR BLD AUTO: 7.5 % — SIGNIFICANT CHANGE UP (ref 2–14)
NEUTROPHILS # BLD AUTO: 4.4 K/UL — SIGNIFICANT CHANGE UP (ref 1.8–7.4)
NEUTROPHILS NFR BLD AUTO: 64.4 % — SIGNIFICANT CHANGE UP (ref 43–77)
NRBC # BLD: 0 /100 WBCS — SIGNIFICANT CHANGE UP (ref 0–0)
PLATELET # BLD AUTO: 246 K/UL — SIGNIFICANT CHANGE UP (ref 150–400)
POTASSIUM SERPL-MCNC: 6.7 MMOL/L — CRITICAL HIGH (ref 3.5–5.3)
POTASSIUM SERPL-SCNC: 6.7 MMOL/L — CRITICAL HIGH (ref 3.5–5.3)
PROT SERPL-MCNC: 7.3 G/DL — SIGNIFICANT CHANGE UP (ref 6–8.3)
RBC # BLD: 5.19 M/UL — SIGNIFICANT CHANGE UP (ref 4.2–5.8)
RBC # FLD: 14.7 % — HIGH (ref 10.3–14.5)
SODIUM SERPL-SCNC: 136 MMOL/L — SIGNIFICANT CHANGE UP (ref 135–145)
T4 FREE SERPL-MCNC: 0.6 NG/DL — LOW (ref 0.9–1.8)
TSH SERPL-MCNC: 2.44 UIU/ML — SIGNIFICANT CHANGE UP (ref 0.27–4.2)
WBC # BLD: 6.82 K/UL — SIGNIFICANT CHANGE UP (ref 3.8–10.5)
WBC # FLD AUTO: 6.82 K/UL — SIGNIFICANT CHANGE UP (ref 3.8–10.5)

## 2023-01-23 PROCEDURE — 99214 OFFICE O/P EST MOD 30 MIN: CPT

## 2023-01-23 RX ORDER — OMEPRAZOLE 20 MG/1
20 CAPSULE, DELAYED RELEASE ORAL
Qty: 90 | Refills: 0 | Status: DISCONTINUED | COMMUNITY
Start: 2022-06-27 | End: 2023-01-23

## 2023-01-24 DIAGNOSIS — Z51.11 ENCOUNTER FOR ANTINEOPLASTIC CHEMOTHERAPY: ICD-10-CM

## 2023-01-25 ENCOUNTER — RESULT REVIEW (OUTPATIENT)
Age: 63
End: 2023-01-25

## 2023-01-25 ENCOUNTER — APPOINTMENT (OUTPATIENT)
Dept: HEMATOLOGY ONCOLOGY | Facility: CLINIC | Age: 63
End: 2023-01-25

## 2023-01-25 LAB
ANION GAP SERPL CALC-SCNC: 12 MMOL/L
BASOPHILS # BLD AUTO: 0.04 K/UL — SIGNIFICANT CHANGE UP (ref 0–0.2)
BASOPHILS NFR BLD AUTO: 0.7 % — SIGNIFICANT CHANGE UP (ref 0–2)
BUN SERPL-MCNC: 18 MG/DL
CALCIUM SERPL-MCNC: 9.6 MG/DL
CHLORIDE SERPL-SCNC: 102 MMOL/L
CO2 SERPL-SCNC: 22 MMOL/L
CREAT SERPL-MCNC: 1.6 MG/DL
EGFR: 48 ML/MIN/1.73M2
EOSINOPHIL # BLD AUTO: 0.24 K/UL — SIGNIFICANT CHANGE UP (ref 0–0.5)
EOSINOPHIL NFR BLD AUTO: 4.1 % — SIGNIFICANT CHANGE UP (ref 0–6)
GLUCOSE SERPL-MCNC: 92 MG/DL
HCT VFR BLD CALC: 43.5 % — SIGNIFICANT CHANGE UP (ref 39–50)
HGB BLD-MCNC: 14.2 G/DL — SIGNIFICANT CHANGE UP (ref 13–17)
IMM GRANULOCYTES NFR BLD AUTO: 0.2 % — SIGNIFICANT CHANGE UP (ref 0–0.9)
LYMPHOCYTES # BLD AUTO: 1.46 K/UL — SIGNIFICANT CHANGE UP (ref 1–3.3)
LYMPHOCYTES # BLD AUTO: 24.7 % — SIGNIFICANT CHANGE UP (ref 13–44)
MCHC RBC-ENTMCNC: 26.2 PG — LOW (ref 27–34)
MCHC RBC-ENTMCNC: 32.6 G/DL — SIGNIFICANT CHANGE UP (ref 32–36)
MCV RBC AUTO: 80.3 FL — SIGNIFICANT CHANGE UP (ref 80–100)
MONOCYTES # BLD AUTO: 0.5 K/UL — SIGNIFICANT CHANGE UP (ref 0–0.9)
MONOCYTES NFR BLD AUTO: 8.5 % — SIGNIFICANT CHANGE UP (ref 2–14)
NEUTROPHILS # BLD AUTO: 3.66 K/UL — SIGNIFICANT CHANGE UP (ref 1.8–7.4)
NEUTROPHILS NFR BLD AUTO: 61.8 % — SIGNIFICANT CHANGE UP (ref 43–77)
NRBC # BLD: 0 /100 WBCS — SIGNIFICANT CHANGE UP (ref 0–0)
PLATELET # BLD AUTO: 250 K/UL — SIGNIFICANT CHANGE UP (ref 150–400)
POTASSIUM SERPL-SCNC: 4.8 MMOL/L
RBC # BLD: 5.42 M/UL — SIGNIFICANT CHANGE UP (ref 4.2–5.8)
RBC # FLD: 14.6 % — HIGH (ref 10.3–14.5)
SODIUM SERPL-SCNC: 136 MMOL/L
WBC # BLD: 5.91 K/UL — SIGNIFICANT CHANGE UP (ref 3.8–10.5)
WBC # FLD AUTO: 5.91 K/UL — SIGNIFICANT CHANGE UP (ref 3.8–10.5)

## 2023-02-05 NOTE — HISTORY OF PRESENT ILLNESS
[de-identified] : Zac Aldridge is a 62 years old male\par \par initially presented to the emergency department at  on October 1 with left flank pain and left lower quadrant  and gross hematuria. \par \par 10/11/22 A non contrast CT abdomen and pelvis demonstrated an 8 cm left upper pole renal mass, 1 cm para-aortic LN. There were also two parenchymal nodules in the left lower lobe of the lung. CT chest revealed stable 6mm and 9mm left lower lung nodules.   \par \par 10/31/22 underwent RAL radical nephrectomy, adrenalectomy, and node dissection, ccRCC grade 2, T3a with involvement of perirenal fat, 17 nodes all negative, margins negative and LVI negative.\par \par His gross hematuria was resolved after surgery. \par \par Now denies left flank pain and LLQ pain, feels overall fine.   [de-identified] : ccRCC [de-identified] : 12/30/22: today he feels well. appetite and energy unchanged. no rash, joint pain, CP, abn pain, N/V/D. no difficulty or reaction to first treatment. he does have intermittent episodes where he feels that he can't catch his breath. this has been going on for over a year and is not associated with exertion or anxiety. he has no HUBBARD or CP with exertion. he reports that taking 2-3 deep breaths resolves the issue. told him to continue to monitor and let us know if it is worsening \par \par 1/23/23: C3 keytruda. Patient has been feeling well, denies symptoms or side effects. His TSH low and T4 high on last treatment denies excessive sweating, palpitations, jittery, changes in hair and nails. He stopped taking omeprazole and is now taking pepcid. He has not seen his cardiologist, Dr. Cain, since before his nephrectomy and his is planning to go back soon. Patient denies fever, chills, headache, vision changes, cough, shortness of breath, chest pain, palpitations, abdominal pain, nausea/vomiting, diarrhea, constipation, dysuria, hematuria, itching, rashes, joint pain, mucositis, changes in sensation.\par

## 2023-02-05 NOTE — PHYSICAL EXAM
[Normal] : affect appropriate [de-identified] : anicteric  [de-identified] : supple, non tender, FROM  [de-identified] : AICD in place [de-identified] : no edema

## 2023-02-05 NOTE — ASSESSMENT
[FreeTextEntry1] : 62 year old male initially presented left flank pain and gross hematuria and was found to have an 8cm left renal mass s/p radical nephrectomy, pT3a clear cell RCC, grade 2, negative nodes. CT chest revealed two subcentimeter nodules. He is likely to have stage III (pT3aN0). His two subcentimeter nodules in the LLL will be monitored and currently are too small to be characterized. Given his T3a disease with high risk of recurrence. Keynote 564 demonstrated adjuvant keytruda for 1 year vs placebo significantly prolonged DFS. He started keytruda in December 2022. \par \par Plan:\par \par ccRCC\par - continue keytruda [id number] x 1 year, started Dec 2022, proceed with C3 today \par - will consider q6week doseing after completed 3 months of therapy, due to patient preference \par - continue to monitor blood work on immunotherapy today, follow up labs from treatment today\par - monitor TFTs, TSH low 0.02 and T4 high 3.4 on 1/3, denies symptoms of hyperthyroid\par - interval scans due late January/early February\par - monitor for iRAEs, reviewed with patient today; including but not limited to: fatigue, skin rash, joint pain, hypothyroidism, pneumonitis, hepatitis, nephritis, colitis, myocarditis, pericarditis, hypophysitis.\par \par Instructed to contact our office with any new/worsening symptoms.\par Patient educated regarding plan of care, all questions/concerns addressed to the best of my abilities and patient's apparent satisfaction.\par RTC 3 weeks

## 2023-02-05 NOTE — REVIEW OF SYSTEMS
[Fever] : no fever [Chills] : no chills [Fatigue] : no fatigue [Dysphagia] : no dysphagia [Odynophagia] : no odynophagia [Mucosal Pain] : no mucosal pain [Chest Pain] : no chest pain [Palpitations] : no palpitations [Lower Ext Edema] : no lower extremity edema [Shortness Of Breath] : no shortness of breath [Cough] : no cough [Abdominal Pain] : no abdominal pain [Vomiting] : no vomiting [Constipation] : no constipation [Diarrhea] : no diarrhea [Dysuria] : no dysuria [Joint Pain] : no joint pain [Muscle Pain] : no muscle pain [Skin Rash] : no skin rash [Dizziness] : no dizziness [Difficulty Walking] : no difficulty walking [Easy Bleeding] : no tendency for easy bleeding [Easy Bruising] : no tendency for easy bruising

## 2023-02-13 ENCOUNTER — RESULT REVIEW (OUTPATIENT)
Age: 63
End: 2023-02-13

## 2023-02-13 ENCOUNTER — APPOINTMENT (OUTPATIENT)
Dept: HEMATOLOGY ONCOLOGY | Facility: CLINIC | Age: 63
End: 2023-02-13
Payer: MEDICARE

## 2023-02-13 ENCOUNTER — APPOINTMENT (OUTPATIENT)
Dept: INFUSION THERAPY | Facility: HOSPITAL | Age: 63
End: 2023-02-13

## 2023-02-13 VITALS
HEIGHT: 65.98 IN | DIASTOLIC BLOOD PRESSURE: 81 MMHG | HEART RATE: 70 BPM | SYSTOLIC BLOOD PRESSURE: 132 MMHG | BODY MASS INDEX: 33.13 KG/M2 | WEIGHT: 206.13 LBS | RESPIRATION RATE: 16 BRPM | OXYGEN SATURATION: 95 % | TEMPERATURE: 97.5 F

## 2023-02-13 DIAGNOSIS — Z51.12 ENCOUNTER FOR ANTINEOPLASTIC IMMUNOTHERAPY: ICD-10-CM

## 2023-02-13 LAB
ALBUMIN SERPL ELPH-MCNC: 4.4 G/DL — SIGNIFICANT CHANGE UP (ref 3.3–5)
ALP SERPL-CCNC: 84 U/L — SIGNIFICANT CHANGE UP (ref 40–120)
ALT FLD-CCNC: 20 U/L — SIGNIFICANT CHANGE UP (ref 10–45)
ANION GAP SERPL CALC-SCNC: 11 MMOL/L — SIGNIFICANT CHANGE UP (ref 5–17)
AST SERPL-CCNC: 20 U/L — SIGNIFICANT CHANGE UP (ref 10–40)
BASOPHILS # BLD AUTO: 0.07 K/UL — SIGNIFICANT CHANGE UP (ref 0–0.2)
BASOPHILS NFR BLD AUTO: 1.1 % — SIGNIFICANT CHANGE UP (ref 0–2)
BILIRUB SERPL-MCNC: 0.2 MG/DL — SIGNIFICANT CHANGE UP (ref 0.2–1.2)
BUN SERPL-MCNC: 18 MG/DL — SIGNIFICANT CHANGE UP (ref 7–23)
CALCIUM SERPL-MCNC: 9.4 MG/DL — SIGNIFICANT CHANGE UP (ref 8.4–10.5)
CHLORIDE SERPL-SCNC: 102 MMOL/L — SIGNIFICANT CHANGE UP (ref 96–108)
CO2 SERPL-SCNC: 23 MMOL/L — SIGNIFICANT CHANGE UP (ref 22–31)
CREAT SERPL-MCNC: 1.97 MG/DL — HIGH (ref 0.5–1.3)
EGFR: 38 ML/MIN/1.73M2 — LOW
EOSINOPHIL # BLD AUTO: 0.42 K/UL — SIGNIFICANT CHANGE UP (ref 0–0.5)
EOSINOPHIL NFR BLD AUTO: 6.7 % — HIGH (ref 0–6)
GLUCOSE SERPL-MCNC: 93 MG/DL — SIGNIFICANT CHANGE UP (ref 70–99)
HCT VFR BLD CALC: 43.8 % — SIGNIFICANT CHANGE UP (ref 39–50)
HGB BLD-MCNC: 14.3 G/DL — SIGNIFICANT CHANGE UP (ref 13–17)
IMM GRANULOCYTES NFR BLD AUTO: 0.3 % — SIGNIFICANT CHANGE UP (ref 0–0.9)
LYMPHOCYTES # BLD AUTO: 1.77 K/UL — SIGNIFICANT CHANGE UP (ref 1–3.3)
LYMPHOCYTES # BLD AUTO: 28.4 % — SIGNIFICANT CHANGE UP (ref 13–44)
MCHC RBC-ENTMCNC: 26.4 PG — LOW (ref 27–34)
MCHC RBC-ENTMCNC: 32.6 G/DL — SIGNIFICANT CHANGE UP (ref 32–36)
MCV RBC AUTO: 80.8 FL — SIGNIFICANT CHANGE UP (ref 80–100)
MONOCYTES # BLD AUTO: 0.42 K/UL — SIGNIFICANT CHANGE UP (ref 0–0.9)
MONOCYTES NFR BLD AUTO: 6.7 % — SIGNIFICANT CHANGE UP (ref 2–14)
NEUTROPHILS # BLD AUTO: 3.54 K/UL — SIGNIFICANT CHANGE UP (ref 1.8–7.4)
NEUTROPHILS NFR BLD AUTO: 56.8 % — SIGNIFICANT CHANGE UP (ref 43–77)
NRBC # BLD: 0 /100 WBCS — SIGNIFICANT CHANGE UP (ref 0–0)
PLATELET # BLD AUTO: 218 K/UL — SIGNIFICANT CHANGE UP (ref 150–400)
POTASSIUM SERPL-MCNC: 5.1 MMOL/L — SIGNIFICANT CHANGE UP (ref 3.5–5.3)
POTASSIUM SERPL-SCNC: 5.1 MMOL/L — SIGNIFICANT CHANGE UP (ref 3.5–5.3)
PROT SERPL-MCNC: 7.3 G/DL — SIGNIFICANT CHANGE UP (ref 6–8.3)
RBC # BLD: 5.42 M/UL — SIGNIFICANT CHANGE UP (ref 4.2–5.8)
RBC # FLD: 15 % — HIGH (ref 10.3–14.5)
SODIUM SERPL-SCNC: 136 MMOL/L — SIGNIFICANT CHANGE UP (ref 135–145)
T4 FREE SERPL-MCNC: <0.1 NG/DL — LOW (ref 0.9–1.8)
TSH SERPL-MCNC: 57 UIU/ML — HIGH (ref 0.27–4.2)
WBC # BLD: 6.24 K/UL — SIGNIFICANT CHANGE UP (ref 3.8–10.5)
WBC # FLD AUTO: 6.24 K/UL — SIGNIFICANT CHANGE UP (ref 3.8–10.5)

## 2023-02-13 PROCEDURE — 99214 OFFICE O/P EST MOD 30 MIN: CPT

## 2023-02-15 ENCOUNTER — NON-APPOINTMENT (OUTPATIENT)
Age: 63
End: 2023-02-15

## 2023-02-24 NOTE — REVIEW OF SYSTEMS
[Skin Rash] : skin rash [Fever] : no fever [Chills] : no chills [Fatigue] : no fatigue [Dysphagia] : no dysphagia [Odynophagia] : no odynophagia [Mucosal Pain] : no mucosal pain [Chest Pain] : no chest pain [Palpitations] : no palpitations [Lower Ext Edema] : no lower extremity edema [Shortness Of Breath] : no shortness of breath [Cough] : no cough [Abdominal Pain] : no abdominal pain [Vomiting] : no vomiting [Constipation] : no constipation [Diarrhea] : no diarrhea [Dysuria] : no dysuria [Joint Pain] : no joint pain [Muscle Pain] : no muscle pain [Dizziness] : no dizziness [Difficulty Walking] : no difficulty walking [Easy Bleeding] : no tendency for easy bleeding [Easy Bruising] : no tendency for easy bruising

## 2023-02-24 NOTE — HISTORY OF PRESENT ILLNESS
[de-identified] : Zac Aldridge is a 62 years old male\par \par initially presented to the emergency department at [facility] [place] on October 1 with left flank pain and left lower quadrant  and gross hematuria. \par \par 10/11/22 A non contrast CT abdomen and pelvis demonstrated an 8 cm left upper pole renal mass, 1 cm para-aortic LN. There were also two parenchymal nodules in the left lower lobe of the lung. CT chest revealed stable 6mm and 9mm left lower lung nodules.   \par \par 10/31/22 underwent RAL radical nephrectomy, adrenalectomy, and node dissection, ccRCC grade 2, T3a with involvement of perirenal fat, 17 nodes all negative, margins negative and LVI negative.\par \par His gross hematuria was resolved after surgery. \par \par Now denies left flank pain and LLQ pain, feels overall fine.   [de-identified] : ccRCC [de-identified] : 12/30/22: today he feels well. appetite and energy unchanged. no rash, joint pain, CP, abn pain, N/V/D. no difficulty or reaction to first treatment. he does have intermittent episodes where he feels that he can't catch his breath. this has been going on for over a year and is not associated with exertion or anxiety. he has no HUBBARD or CP with exertion. he reports that taking 2-3 deep breaths resolves the issue. told him to continue to monitor and let us know if it is worsening \par \par 1/23/23: C3 keytruda. Patient has been feeling well, denies symptoms or side effects. His TSH low and T4 high on last treatment denies excessive sweating, palpitations, jittery, changes in hair and nails. He stopped taking omeprazole and is now taking pepcid. He has not seen his cardiologist, Dr. Cian, since before his nephrectomy and his is planning to go back soon. Patient denies fever, chills, headache, vision changes, cough, shortness of breath, chest pain, palpitations, abdominal pain, nausea/vomiting, diarrhea, constipation, dysuria, hematuria, itching, rashes, joint pain, mucositis, changes in sensation.\par \par 2/13/23: C4 keytruda. Has been feeling relatively well. Denies fatigue and has good appetite. He does report that about a week after treatment he noticed a rash on his upper back, chest and forehead. This rash was not itchy, he used Jenniffer cream. \par

## 2023-02-24 NOTE — ASSESSMENT
[FreeTextEntry1] : 62 year old male initially presented left flank pain and gross hematuria and was found to have an 8cm left renal mass s/p radical nephrectomy, pT3a clear cell RCC, grade 2, negative nodes. CT chest revealed two subcentimeter nodules. He is likely to have stage III (pT3aN0). His two subcentimeter nodules in the LLL will be monitored and currently are too small to be characterized. Given his T3a disease with high risk of recurrence. Keynote 564 demonstrated adjuvant keytruda for 1 year vs placebo significantly prolonged DFS. He started keytruda in December 2022. \par \par Plan:\par \par ccRCC\par - continue keytruda [id number] x 1 year, started Dec 2022, proceed with C4 today \par - will  start q6week dosing on April 17, due to patient preference \par - continue to monitor blood work on immunotherapy today, follow up labs from treatment today\par - f/u TFTs from treatment today, TSH nl 2.44 and T4 low 0.6 on 1/23, denies symptoms of hypothyroid\par - interval scans due in April, insurance auth for CT Chest and MR AP expires 3/19/23, pt to have scans done early to mid March \par - monitor for iRAEs, reviewed with patient today; including but not limited to: fatigue, skin rash, joint pain, hypothyroidism, pneumonitis, hepatitis, nephritis, colitis, myocarditis, pericarditis, hypophysitis.\par - advised to use OTC hydrocortisone cream if rash reoccurs and is itchy, will call us if worsens \par \par Instructed to contact our office with any new/worsening symptoms.\par Patient educated regarding plan of care, all questions/concerns addressed to the best of my abilities and patient's apparent satisfaction.\par RTC 3 weeks

## 2023-02-24 NOTE — PHYSICAL EXAM
[Normal] : normoactive bowel sounds, soft and nontender, no hepatosplenomegaly or masses appreciated [de-identified] : anicteric  [de-identified] : supple, non tender, FROM  [de-identified] : AICD L chest [de-identified] : no edema  [de-identified] : well healed surgical scars  [de-identified] : warm, dry, intact; pink  to slightly red small papules in V distribution on upper back

## 2023-03-06 ENCOUNTER — RESULT REVIEW (OUTPATIENT)
Age: 63
End: 2023-03-06

## 2023-03-06 ENCOUNTER — APPOINTMENT (OUTPATIENT)
Dept: INFUSION THERAPY | Facility: HOSPITAL | Age: 63
End: 2023-03-06

## 2023-03-06 ENCOUNTER — APPOINTMENT (OUTPATIENT)
Dept: HEMATOLOGY ONCOLOGY | Facility: CLINIC | Age: 63
End: 2023-03-06
Payer: MEDICARE

## 2023-03-06 VITALS
RESPIRATION RATE: 16 BRPM | TEMPERATURE: 97.9 F | WEIGHT: 206.35 LBS | DIASTOLIC BLOOD PRESSURE: 68 MMHG | HEART RATE: 98 BPM | BODY MASS INDEX: 33.16 KG/M2 | HEIGHT: 65.98 IN | OXYGEN SATURATION: 99 % | SYSTOLIC BLOOD PRESSURE: 119 MMHG

## 2023-03-06 LAB
ALBUMIN SERPL ELPH-MCNC: 5.3 G/DL — HIGH (ref 3.3–5)
ALP SERPL-CCNC: 78 U/L — SIGNIFICANT CHANGE UP (ref 40–120)
ALT FLD-CCNC: 30 U/L — SIGNIFICANT CHANGE UP (ref 10–45)
ANION GAP SERPL CALC-SCNC: 16 MMOL/L — SIGNIFICANT CHANGE UP (ref 5–17)
AST SERPL-CCNC: 28 U/L — SIGNIFICANT CHANGE UP (ref 10–40)
BASOPHILS # BLD AUTO: 0.05 K/UL — SIGNIFICANT CHANGE UP (ref 0–0.2)
BASOPHILS NFR BLD AUTO: 0.9 % — SIGNIFICANT CHANGE UP (ref 0–2)
BILIRUB SERPL-MCNC: 0.5 MG/DL — SIGNIFICANT CHANGE UP (ref 0.2–1.2)
BUN SERPL-MCNC: 19 MG/DL — SIGNIFICANT CHANGE UP (ref 7–23)
CALCIUM SERPL-MCNC: 10.2 MG/DL — SIGNIFICANT CHANGE UP (ref 8.4–10.5)
CHLORIDE SERPL-SCNC: 97 MMOL/L — SIGNIFICANT CHANGE UP (ref 96–108)
CO2 SERPL-SCNC: 22 MMOL/L — SIGNIFICANT CHANGE UP (ref 22–31)
CREAT SERPL-MCNC: 2.01 MG/DL — HIGH (ref 0.5–1.3)
EGFR: 37 ML/MIN/1.73M2 — LOW
EOSINOPHIL # BLD AUTO: 0.32 K/UL — SIGNIFICANT CHANGE UP (ref 0–0.5)
EOSINOPHIL NFR BLD AUTO: 5.5 % — SIGNIFICANT CHANGE UP (ref 0–6)
GLUCOSE SERPL-MCNC: 90 MG/DL — SIGNIFICANT CHANGE UP (ref 70–99)
HCT VFR BLD CALC: 45.8 % — SIGNIFICANT CHANGE UP (ref 39–50)
HGB BLD-MCNC: 15.1 G/DL — SIGNIFICANT CHANGE UP (ref 13–17)
IMM GRANULOCYTES NFR BLD AUTO: 0.5 % — SIGNIFICANT CHANGE UP (ref 0–0.9)
LYMPHOCYTES # BLD AUTO: 1.61 K/UL — SIGNIFICANT CHANGE UP (ref 1–3.3)
LYMPHOCYTES # BLD AUTO: 27.7 % — SIGNIFICANT CHANGE UP (ref 13–44)
MCHC RBC-ENTMCNC: 26.7 PG — LOW (ref 27–34)
MCHC RBC-ENTMCNC: 33 G/DL — SIGNIFICANT CHANGE UP (ref 32–36)
MCV RBC AUTO: 80.9 FL — SIGNIFICANT CHANGE UP (ref 80–100)
MONOCYTES # BLD AUTO: 0.33 K/UL — SIGNIFICANT CHANGE UP (ref 0–0.9)
MONOCYTES NFR BLD AUTO: 5.7 % — SIGNIFICANT CHANGE UP (ref 2–14)
NEUTROPHILS # BLD AUTO: 3.47 K/UL — SIGNIFICANT CHANGE UP (ref 1.8–7.4)
NEUTROPHILS NFR BLD AUTO: 59.7 % — SIGNIFICANT CHANGE UP (ref 43–77)
NRBC # BLD: 0 /100 WBCS — SIGNIFICANT CHANGE UP (ref 0–0)
PLATELET # BLD AUTO: 183 K/UL — SIGNIFICANT CHANGE UP (ref 150–400)
POTASSIUM SERPL-MCNC: 4.7 MMOL/L — SIGNIFICANT CHANGE UP (ref 3.5–5.3)
POTASSIUM SERPL-SCNC: 4.7 MMOL/L — SIGNIFICANT CHANGE UP (ref 3.5–5.3)
PROT SERPL-MCNC: 8.2 G/DL — SIGNIFICANT CHANGE UP (ref 6–8.3)
RBC # BLD: 5.66 M/UL — SIGNIFICANT CHANGE UP (ref 4.2–5.8)
RBC # FLD: 15.5 % — HIGH (ref 10.3–14.5)
SODIUM SERPL-SCNC: 134 MMOL/L — LOW (ref 135–145)
T4 FREE SERPL-MCNC: 0.3 NG/DL — LOW (ref 0.9–1.8)
TSH SERPL-MCNC: 66.2 UIU/ML — HIGH (ref 0.27–4.2)
WBC # BLD: 5.81 K/UL — SIGNIFICANT CHANGE UP (ref 3.8–10.5)
WBC # FLD AUTO: 5.81 K/UL — SIGNIFICANT CHANGE UP (ref 3.8–10.5)

## 2023-03-06 PROCEDURE — 99213 OFFICE O/P EST LOW 20 MIN: CPT

## 2023-03-06 RX ORDER — SACUBITRIL AND VALSARTAN 24; 26 MG/1; MG/1
1 TABLET, FILM COATED ORAL
Qty: 0 | Refills: 0 | DISCHARGE

## 2023-03-06 RX ORDER — CARVEDILOL PHOSPHATE 80 MG/1
1 CAPSULE, EXTENDED RELEASE ORAL
Qty: 0 | Refills: 0 | DISCHARGE

## 2023-03-06 NOTE — PHYSICAL EXAM
[Normal] : affect appropriate [de-identified] : anicteric  [de-identified] : supple, non tender, FROM  [de-identified] : AICD L chest [de-identified] : no edema  [de-identified] : well healed surgical scars  [de-identified] : warm, dry, intact; pink  to slightly red small papules in V distribution on upper back

## 2023-03-06 NOTE — HISTORY OF PRESENT ILLNESS
[de-identified] : Zac Aldridge is a 62 years old male\par \par initially presented to the emergency department at [facility] on October 1 with left flank pain and left lower quadrant  and gross hematuria. \par \par 10/11/22 A non contrast CT abdomen and pelvis demonstrated an 8 cm left upper pole renal mass, 1 cm para-aortic LN. There were also two parenchymal nodules in the left lower lobe of the lung. CT chest revealed stable 6mm and 9mm left lower lung nodules.   \par \par 10/31/22 underwent RAL radical nephrectomy, adrenalectomy, and node dissection, ccRCC grade 2, T3a with involvement of perirenal fat, 17 nodes all negative, margins negative and LVI negative.\par \par His gross hematuria was resolved after surgery. \par \par Now denies left flank pain and LLQ pain, feels overall fine.   [de-identified] : ccRCC [de-identified] : 12/30/22: today he feels well. appetite and energy unchanged. no rash, joint pain, CP, abn pain, N/V/D. no difficulty or reaction to first treatment. he does have intermittent episodes where he feels that he can't catch his breath. this has been going on for over a year and is not associated with exertion or anxiety. he has no HUBBARD or CP with exertion. he reports that taking 2-3 deep breaths resolves the issue. told him to continue to monitor and let us know if it is worsening \par \par 1/23/23: C3 keytruda. Patient has been feeling well, denies symptoms or side effects. His TSH low and T4 high on last treatment denies excessive sweating, palpitations, jittery, changes in hair and nails. He stopped taking omeprazole and is now taking pepcid. He has not seen his cardiologist, Dr. Cain, since before his nephrectomy and his is planning to go back soon. Patient denies fever, chills, headache, vision changes, cough, shortness of breath, chest pain, palpitations, abdominal pain, nausea/vomiting, diarrhea, constipation, dysuria, hematuria, itching, rashes, joint pain, mucositis, changes in sensation.\par \par 2/13/23: C4 keytruda. Has been feeling relatively well. Denies fatigue and has good appetite. He does report that about a week after treatment he noticed a rash on his upper back, chest and forehead. This rash was not itchy, he used Jenniffer cream. \par \par 3/6/23 C5 keytruda today, feels overall fine. \par

## 2023-03-06 NOTE — ASSESSMENT
[FreeTextEntry1] : 62 year old male initially presented left flank pain and gross hematuria and was found to have an 8cm left renal mass s/p radical nephrectomy, pT3a clear cell RCC, grade 2, negative nodes. CT chest revealed two subcentimeter nodules. He is likely to have stage III (pT3aN0). His two subcentimeter nodules in the LLL will be monitored and currently are too small to be characterized. Given his T3a disease with high risk of recurrence. Keynote 564 demonstrated adjuvant keytruda for 1 year vs placebo significantly prolonged DFS. He started keytruda in December 2022. \par \par Plan:\par \par ccRCC\par - continue keytruda [id number] x 1 year, started Dec 2022, proceed with C5 today \par - will  start q6week dosing on April 17, due to patient preference \par - continue to monitor blood work on immunotherapy today, follow up labs from treatment today\par - f/u TFTs from treatment today, TSH nl 2.44 and T4 low 0.6 on 1/23, denies symptoms of hypothyroid\par - interval scans due in April, insurance auth for CT Chest and MR AP expires 3/19/23, pt to have scans done early to mid March \par - monitor for iRAEs, reviewed with patient today; including but not limited to: fatigue, skin rash, joint pain, hypothyroidism, pneumonitis, hepatitis, nephritis, colitis, myocarditis, pericarditis, hypophysitis.\par - advised to use OTC hydrocortisone cream if rash reoccurs and is itchy, will call us if worsens \par \par Instructed to contact our office with any new/worsening symptoms.\par Patient educated regarding plan of care, all questions/concerns addressed to the best of my abilities and patient's apparent satisfaction.\par RTC 3 weeks

## 2023-03-11 ENCOUNTER — RESULT REVIEW (OUTPATIENT)
Age: 63
End: 2023-03-11

## 2023-03-11 ENCOUNTER — APPOINTMENT (OUTPATIENT)
Dept: INFUSION THERAPY | Facility: HOSPITAL | Age: 63
End: 2023-03-11

## 2023-03-11 LAB
ANION GAP SERPL CALC-SCNC: 12 MMOL/L — SIGNIFICANT CHANGE UP (ref 5–17)
APPEARANCE UR: CLEAR — SIGNIFICANT CHANGE UP
BILIRUB UR-MCNC: NEGATIVE — SIGNIFICANT CHANGE UP
BUN SERPL-MCNC: 23 MG/DL — SIGNIFICANT CHANGE UP (ref 7–23)
CALCIUM SERPL-MCNC: 9.8 MG/DL — SIGNIFICANT CHANGE UP (ref 8.4–10.5)
CHLORIDE SERPL-SCNC: 100 MMOL/L — SIGNIFICANT CHANGE UP (ref 96–108)
CO2 SERPL-SCNC: 25 MMOL/L — SIGNIFICANT CHANGE UP (ref 22–31)
COLOR SPEC: COLORLESS — SIGNIFICANT CHANGE UP
CREAT SERPL-MCNC: 2.17 MG/DL — HIGH (ref 0.5–1.3)
DIFF PNL FLD: NEGATIVE — SIGNIFICANT CHANGE UP
EGFR: 34 ML/MIN/1.73M2 — LOW
GLUCOSE SERPL-MCNC: 62 MG/DL — LOW (ref 70–99)
GLUCOSE UR QL: NEGATIVE — SIGNIFICANT CHANGE UP
KETONES UR-MCNC: NEGATIVE — SIGNIFICANT CHANGE UP
LEUKOCYTE ESTERASE UR-ACNC: NEGATIVE — SIGNIFICANT CHANGE UP
NITRITE UR-MCNC: NEGATIVE — SIGNIFICANT CHANGE UP
PH UR: 6 — SIGNIFICANT CHANGE UP (ref 5–8)
POTASSIUM SERPL-MCNC: 4.6 MMOL/L — SIGNIFICANT CHANGE UP (ref 3.5–5.3)
POTASSIUM SERPL-SCNC: 4.6 MMOL/L — SIGNIFICANT CHANGE UP (ref 3.5–5.3)
PROT UR-MCNC: NEGATIVE — SIGNIFICANT CHANGE UP
SODIUM SERPL-SCNC: 137 MMOL/L — SIGNIFICANT CHANGE UP (ref 135–145)
SP GR SPEC: 1.01 — LOW (ref 1.01–1.02)
UROBILINOGEN FLD QL: SIGNIFICANT CHANGE UP

## 2023-03-12 LAB
CREAT ?TM UR-MCNC: 33 MG/DL — SIGNIFICANT CHANGE UP
SODIUM UR-SCNC: 118 MMOL/L — SIGNIFICANT CHANGE UP

## 2023-03-13 ENCOUNTER — NON-APPOINTMENT (OUTPATIENT)
Age: 63
End: 2023-03-13

## 2023-03-13 DIAGNOSIS — Z51.89 ENCOUNTER FOR OTHER SPECIFIED AFTERCARE: ICD-10-CM

## 2023-03-14 ENCOUNTER — APPOINTMENT (OUTPATIENT)
Dept: NEPHROLOGY | Facility: CLINIC | Age: 63
End: 2023-03-14
Payer: MEDICARE

## 2023-03-14 VITALS
WEIGHT: 208.33 LBS | BODY MASS INDEX: 34.71 KG/M2 | HEIGHT: 65 IN | HEART RATE: 66 BPM | OXYGEN SATURATION: 97 % | TEMPERATURE: 97.1 F | SYSTOLIC BLOOD PRESSURE: 138 MMHG | DIASTOLIC BLOOD PRESSURE: 88 MMHG

## 2023-03-14 LAB
25(OH)D3 SERPL-MCNC: 20.6 NG/ML
ALBUMIN SERPL ELPH-MCNC: 5.2 G/DL
ANION GAP SERPL CALC-SCNC: 13 MMOL/L
APPEARANCE: CLEAR
BACTERIA: NEGATIVE
BASOPHILS # BLD AUTO: 0.06 K/UL
BASOPHILS NFR BLD AUTO: 0.9 %
BILIRUBIN URINE: NEGATIVE
BLOOD URINE: NEGATIVE
BUN SERPL-MCNC: 21 MG/DL
CALCIUM SERPL-MCNC: 9.4 MG/DL
CALCIUM SERPL-MCNC: 9.4 MG/DL
CHLORIDE SERPL-SCNC: 98 MMOL/L
CO2 SERPL-SCNC: 24 MMOL/L
COLOR: COLORLESS
CREAT SERPL-MCNC: 1.92 MG/DL
CREAT SPEC-SCNC: 47 MG/DL
CREAT/PROT UR: 0.1 RATIO
CRP SERPL-MCNC: <3 MG/L
EGFR: 39 ML/MIN/1.73M2
EOSINOPHIL # BLD AUTO: 0.37 K/UL
EOSINOPHIL NFR BLD AUTO: 5.5 %
GLUCOSE QUALITATIVE U: NEGATIVE
GLUCOSE SERPL-MCNC: 95 MG/DL
HCT VFR BLD CALC: 46 %
HGB BLD-MCNC: 15 G/DL
HYALINE CASTS: 0 /LPF
IMM GRANULOCYTES NFR BLD AUTO: 0.6 %
KETONES URINE: NEGATIVE
LEUKOCYTE ESTERASE URINE: NEGATIVE
LYMPHOCYTES # BLD AUTO: 1.86 K/UL
LYMPHOCYTES NFR BLD AUTO: 27.5 %
MAGNESIUM SERPL-MCNC: 2.1 MG/DL
MAN DIFF?: NORMAL
MCHC RBC-ENTMCNC: 26.8 PG
MCHC RBC-ENTMCNC: 32.6 GM/DL
MCV RBC AUTO: 82.3 FL
MICROSCOPIC-UA: NORMAL
MONOCYTES # BLD AUTO: 0.44 K/UL
MONOCYTES NFR BLD AUTO: 6.5 %
NEUTROPHILS # BLD AUTO: 3.99 K/UL
NEUTROPHILS NFR BLD AUTO: 59 %
NITRITE URINE: NEGATIVE
PARATHYROID HORMONE INTACT: 79 PG/ML
PH URINE: 7
PHOSPHATE SERPL-MCNC: 2.9 MG/DL
PLATELET # BLD AUTO: 193 K/UL
POTASSIUM SERPL-SCNC: 4.6 MMOL/L
PROT UR-MCNC: 5 MG/DL
PROTEIN URINE: NEGATIVE
RBC # BLD: 5.59 M/UL
RBC # FLD: 15.7 %
RED BLOOD CELLS URINE: 0 /HPF
SODIUM SERPL-SCNC: 135 MMOL/L
SPECIFIC GRAVITY URINE: 1.01
SQUAMOUS EPITHELIAL CELLS: 0 /HPF
UROBILINOGEN URINE: NORMAL
WBC # FLD AUTO: 6.76 K/UL
WHITE BLOOD CELLS URINE: 0 /HPF

## 2023-03-14 PROCEDURE — 99214 OFFICE O/P EST MOD 30 MIN: CPT

## 2023-03-14 RX ORDER — ASPIRIN 81 MG/1
81 TABLET, COATED ORAL DAILY
Refills: 0 | Status: DISCONTINUED | COMMUNITY
Start: 2022-12-22 | End: 2023-03-14

## 2023-03-15 RX ORDER — ISOSORBIDE MONONITRATE 30 MG/1
30 TABLET, EXTENDED RELEASE ORAL DAILY
Refills: 3 | Status: DISCONTINUED | COMMUNITY
Start: 2022-08-25 | End: 2023-03-15

## 2023-03-15 NOTE — ASSESSMENT
[FreeTextEntry1] : Mr. Iqbal is a 63 y/o male with recently diagnosed renal cell ca here for post hospital visit. \par He was admitted to the hospital in October for  an elective Cystoscopy & Robotic Left Nephroureterectomy retroperitoneal lymph node dissection  for a left upper pole renal mass which was  done on 10/31/22. Prior to this, he had gross hematuria. Pathology was c/w RCC ( Stage 111). He was started on Keytruda on 12/5 which he is tolerating well. Also, with 2 small lung nodules in left lower lung which are currently being monitored\par \par \par \par FRANKLYN superimposed on CKD stage 3 /Solitary Kidney: Baseline creatinine ( 1.4-1.7 mg./dl)  \par CKD in the setting of long standing H/O HTN. Will ask pathology to evaluate the non neoplastic tissue of the  nephrectomy sample\par His AK could possibly be related to ICI use resulting in AIN . Will check repeat labs today, along with Ur RBP/Cr, cRP, Soluble IL2 level , if these are elevated along with  further worsening of kidney function, will need to empirically treat with steroids.  \par Continue to avoid Contrast, PPI, NSAIDS, herbal supplements \par Maintain adequate hydration\par  \par \par HTN: BP is slightly up but well controlled at home\par Maintain on the current regimen\par \par ADDENDUM:\par Creatinine slightly better but  still over his baseline \par CRP is not elevated. Await the urine results ( biomarkers). Less likely to be interstitial nephritis. \par F/U renal sonogram\par Maintain adequate hydration\par Secondary hyperparathyroidism noted along with low Vit D levels: start supplementation of vitamin D \par Discussed with him \par \par f/u in 2 months\par \par \par  \par \par

## 2023-03-15 NOTE — HISTORY OF PRESENT ILLNESS
[FreeTextEntry1] : \par : [id number]\par \par Mr. Iqbal is a 63 y/o male with recently diagnosed renal cell ca here for post hospital visit. \par \par \par He was admitted to the hospital in October for  an elective Cystoscopy & Robotic Left Nephroureterectomy retroperitoneal lymph node dissection  for a left upper pole renal mass which was  done on 10/31/22. Prior to this, he had gross hematuria. Pathology was c/w RCC ( Stage 111). He was started on Keytruda on 12/5 which he is tolerating well. Also, with 2 small lung nodules in left lower lung which are currently being monitored\par \par His other medical history is significant for: HTN, Hyperlipidemia,  CAD (stents x4 on ASA/Plavix), AICD (4 years ago) and kidney stones.  \par \par Also, with CKD which was assumed to be secondary to HTN . Baseline creatinine in the  1.4-1.7 mg/dl range. Post surgery, he developed some FRANKLYN with peak creatinine of 1.8 which was assumed  to be hemodynamically mediated due to surgery. Recently his creatinine is down to his baseline in the 1.5 mg/dl. Proteinuria of 0.8 gms in the hospital \par \par  Comes for a follow up visit today. He has been getting Keytruda Q 3 weeks since Dec 2022  . Gets it every 3 weeks. His last dose was on 3/7 ( C5)and is due for the  next dose on 3/27. After this he will be transitioned to Q6 weeks in April.  Also started on synthroid last month for thyroiditis. \par He feels pretty good. Has been drinking a lot of water. Back pain+. No NSAIDS. Denies any lower extremity edema, SOB. No nausea or vomiting. Hydrating herself well. No recent infections or hospitalizations. Has had gradual worsening of his kidney function since last month,  increasing to 1.9 and now up at  2.1 a few days back. \par Stopped isosorbide as it was causing dizziness. Also stopped MVI/Iron\par \par \par \par   \par  \par  \par \par  \par

## 2023-03-15 NOTE — PHYSICAL EXAM
[General Appearance - Alert] : alert [General Appearance - In No Acute Distress] : in no acute distress [Neck Appearance] : the appearance of the neck was normal [Exaggerated Use Of Accessory Muscles For Inspiration] : no accessory muscle use [Auscultation Breath Sounds / Voice Sounds] : lungs were clear to auscultation bilaterally [Chest Palpation] : palpation of the chest revealed no abnormalities [Apical Impulse] : the apical impulse was normal [Heart Sounds] : normal S1 and S2 [Heart Sounds Gallop] : no gallops [Murmurs] : no murmurs [Heart Sounds Pericardial Friction Rub] : no pericardial rub [No CVA Tenderness] : no ~M costovertebral angle tenderness [Involuntary Movements] : no involuntary movements were seen [Skin Color & Pigmentation] : normal skin color and pigmentation [] : no rash [No Focal Deficits] : no focal deficits [Oriented To Time, Place, And Person] : oriented to person, place, and time [Impaired Insight] : insight and judgment were intact [Affect] : the affect was normal [Edema] : there was no peripheral edema [FreeTextEntry1] : overweight

## 2023-03-16 LAB — IL2 SERPL-MCNC: 603 PG/ML

## 2023-03-17 LAB
MISCELLANEOUS TEST: NORMAL
PROC NAME: NORMAL

## 2023-03-18 ENCOUNTER — RESULT REVIEW (OUTPATIENT)
Age: 63
End: 2023-03-18

## 2023-03-18 ENCOUNTER — APPOINTMENT (OUTPATIENT)
Dept: CT IMAGING | Facility: IMAGING CENTER | Age: 63
End: 2023-03-18
Payer: MEDICARE

## 2023-03-18 PROCEDURE — 71250 CT THORAX DX C-: CPT | Mod: 26

## 2023-03-20 ENCOUNTER — OUTPATIENT (OUTPATIENT)
Dept: OUTPATIENT SERVICES | Facility: HOSPITAL | Age: 63
LOS: 1 days | End: 2023-03-20
Payer: COMMERCIAL

## 2023-03-20 ENCOUNTER — APPOINTMENT (OUTPATIENT)
Dept: ULTRASOUND IMAGING | Facility: CLINIC | Age: 63
End: 2023-03-20
Payer: MEDICARE

## 2023-03-20 ENCOUNTER — APPOINTMENT (OUTPATIENT)
Dept: ULTRASOUND IMAGING | Facility: CLINIC | Age: 63
End: 2023-03-20

## 2023-03-20 DIAGNOSIS — C64.9 MALIGNANT NEOPLASM OF UNSPECIFIED KIDNEY, EXCEPT RENAL PELVIS: ICD-10-CM

## 2023-03-20 DIAGNOSIS — Z95.810 PRESENCE OF AUTOMATIC (IMPLANTABLE) CARDIAC DEFIBRILLATOR: Chronic | ICD-10-CM

## 2023-03-20 PROCEDURE — 76775 US EXAM ABDO BACK WALL LIM: CPT | Mod: 26

## 2023-03-20 PROCEDURE — 76775 US EXAM ABDO BACK WALL LIM: CPT

## 2023-03-22 ENCOUNTER — OUTPATIENT (OUTPATIENT)
Dept: OUTPATIENT SERVICES | Facility: HOSPITAL | Age: 63
LOS: 1 days | Discharge: HOME | End: 2023-03-22

## 2023-03-22 DIAGNOSIS — Z95.810 PRESENCE OF AUTOMATIC (IMPLANTABLE) CARDIAC DEFIBRILLATOR: Chronic | ICD-10-CM

## 2023-03-22 DIAGNOSIS — C64.9 MALIGNANT NEOPLASM OF UNSPECIFIED KIDNEY, EXCEPT RENAL PELVIS: ICD-10-CM

## 2023-03-26 NOTE — PHYSICAL EXAM
[Normal] : affect appropriate [de-identified] : anicteric  [de-identified] : supple, non tender, FROM  [de-identified] : AICD L chest [de-identified] : no edema  [de-identified] : well healed surgical scars  [de-identified] : warm, dry, intact; pink  to slightly red small papules in V distribution on upper back

## 2023-03-26 NOTE — ASSESSMENT
[FreeTextEntry1] : 62 year old male initially presented left flank pain and gross hematuria and was found to have an 8cm left renal mass s/p radical nephrectomy, pT3a clear cell RCC, grade 2, negative nodes. CT chest revealed two subcentimeter nodules. He is likely to have stage III (pT3aN0). His two subcentimeter nodules in the LLL will be monitored and currently are too small to be characterized. Given his T3a disease with high risk of recurrence. Keynote 564 demonstrated adjuvant keytruda for 1 year vs placebo significantly prolonged DFS. He started keytruda in December 2022. \par \par Plan:\par \par ccRCC\par - continue keytruda [id number] x 1 year, started Dec 2022, proceed with C6 today \par - will  start q6week dosing on April 17, due to patient preference _____\par - CT chest 3/18 with patient: Stable left lower lobe nodules when compared with prior. Minimal bilateral lower lobe groundglass opacities felt to be related to expiratory phase of scanning. Alternatively this may reflect early changes related to immunotherapy. ______ repeat in 2 months, end of May \par - CT AP/MR AP ______\par - monitor for iRAEs, reviewed with patient today; including but not limited to: fatigue, skin rash, joint pain, hypothyroidism, pneumonitis, hepatitis, nephritis, colitis, myocarditis, pericarditis, hypophysitis.\par - advised to use OTC hydrocortisone cream if rash reoccurs and is itchy, will call us if worsens \par \par FRANKLYN on CKD\par - s/p nephrectomy on 10/31/22, when he presented to Oncology in November his Cr was between 1.5 and 1.8. On 3/6 his Cr was 2.01, he was brought in for IVF, Cr inc to 2.17 post hydration, FeNa 5.7% c/w post obstructive etiology. Renal ultrasound normal. Seen by renal, biomarkers not consistent with immunotherapy induced nephritis.\par - repeat labs from today pending \par - continue to follow with Dr. Covington \par - avoid nephrotoxins  \par \par Hypothyroid \par - immunotherapy induced \par - 3/6 TSH 66 T4 0.3, increased synthroid to 50mcg daily \par - follow up repeat TFTs from treatment today \par \par \par Instructed to contact our office with any new/worsening symptoms.\par Patient educated regarding plan of care, all questions/concerns addressed to the best of my abilities and patient's apparent satisfaction.\par RTC 3 weeks

## 2023-03-26 NOTE — HISTORY OF PRESENT ILLNESS
[de-identified] : Zac Aldridge is a 62 years old male\par \par initially presented to the emergency department at [facility] on October 1 with left flank pain and left lower quadrant  and gross hematuria. \par \par 10/11/22 A non contrast CT abdomen and pelvis demonstrated an 8 cm left upper pole renal mass, 1 cm para-aortic LN. There were also two parenchymal nodules in the left lower lobe of the lung. CT chest revealed stable 6mm and 9mm left lower lung nodules.   \par \par 10/31/22 underwent RAL radical nephrectomy, adrenalectomy, and node dissection, ccRCC grade 2, T3a with involvement of perirenal fat, 17 nodes all negative, margins negative and LVI negative.\par \par His gross hematuria was resolved after surgery. \par \par Now denies left flank pain and LLQ pain, feels overall fine.  \par \par 12/30/22: today he feels well. appetite and energy unchanged. no rash, joint pain, CP, abn pain, N/V/D. no difficulty or reaction to first treatment. he does have intermittent episodes where he feels that he can't catch his breath. this has been going on for over a year and is not associated with exertion or anxiety. he has no HUBBARD or CP with exertion. he reports that taking 2-3 deep breaths resolves the issue. told him to continue to monitor and let us know if it is worsening \par \par 1/23/23: C3 keytruda. Patient has been feeling well, denies symptoms or side effects. His TSH low and T4 high on last treatment denies excessive sweating, palpitations, jittery, changes in hair and nails. He stopped taking omeprazole and is now taking pepcid. He has not seen his cardiologist, Dr. Cain, since before his nephrectomy and his is planning to go back soon. Patient denies fever, chills, headache, vision changes, cough, shortness of breath, chest pain, palpitations, abdominal pain, nausea/vomiting, diarrhea, constipation, dysuria, hematuria, itching, rashes, joint pain, mucositis, changes in sensation.\par \par 2/13/23: C4 keytruda. Has been feeling relatively well. Denies fatigue and has good appetite. He does report that about a week after treatment he noticed a rash on his upper back, chest and forehead. This rash was not itchy, he used Jenniffer cream. \par \par 3/6/23 C5 keytruda today, feels overall fine. \par  [de-identified] : ccRCC [de-identified] : \par 3/27/23: C6 keytruda. Last doseon 3/6. He had his nephrectomy on 10/31/22, when he presented to us in November his Cr was between 1.5 and 1.8. On 3/6 his Cr was 2.01, he was brought in for IVF, Cr inc to 2.17 post hydration, FeNa 5.7% c/w post obstructive etiology. Renal ultrasound normal. Seen by renal, biomarkers not consistent with immunotherapy induced nephritis. Reviewed CT chest 3/18 with patient: Stable left lower lobe nodules when compared with prior. Minimal bilateral lower lobe groundglass opacities felt to be related to expiratory phase of scanning. Alternatively this may reflect early changes related to immunotherapy.\par

## 2023-03-27 ENCOUNTER — APPOINTMENT (OUTPATIENT)
Dept: INFUSION THERAPY | Facility: HOSPITAL | Age: 63
End: 2023-03-27

## 2023-03-27 ENCOUNTER — APPOINTMENT (OUTPATIENT)
Dept: HEMATOLOGY ONCOLOGY | Facility: CLINIC | Age: 63
End: 2023-03-27

## 2023-03-27 ENCOUNTER — NON-APPOINTMENT (OUTPATIENT)
Age: 63
End: 2023-03-27

## 2023-03-28 ENCOUNTER — NON-APPOINTMENT (OUTPATIENT)
Age: 63
End: 2023-03-28

## 2023-05-16 ENCOUNTER — APPOINTMENT (OUTPATIENT)
Dept: UROLOGY | Facility: CLINIC | Age: 63
End: 2023-05-16
Payer: MEDICARE

## 2023-05-16 VITALS
DIASTOLIC BLOOD PRESSURE: 84 MMHG | TEMPERATURE: 98.1 F | RESPIRATION RATE: 17 BRPM | SYSTOLIC BLOOD PRESSURE: 134 MMHG | HEART RATE: 66 BPM

## 2023-05-16 DIAGNOSIS — N17.9 ACUTE KIDNEY FAILURE, UNSPECIFIED: ICD-10-CM

## 2023-05-16 DIAGNOSIS — N28.89 OTHER SPECIFIED DISORDERS OF KIDNEY AND URETER: ICD-10-CM

## 2023-05-16 PROCEDURE — 99213 OFFICE O/P EST LOW 20 MIN: CPT

## 2023-05-17 LAB
ANION GAP SERPL CALC-SCNC: 14 MMOL/L
BUN SERPL-MCNC: 19 MG/DL
CALCIUM SERPL-MCNC: 9.8 MG/DL
CHLORIDE SERPL-SCNC: 100 MMOL/L
CO2 SERPL-SCNC: 25 MMOL/L
CREAT SERPL-MCNC: 1.91 MG/DL
EGFR: 39 ML/MIN/1.73M2
GLUCOSE SERPL-MCNC: 98 MG/DL
POTASSIUM SERPL-SCNC: 5.2 MMOL/L
SODIUM SERPL-SCNC: 138 MMOL/L

## 2023-06-07 ENCOUNTER — APPOINTMENT (OUTPATIENT)
Dept: MRI IMAGING | Facility: HOSPITAL | Age: 63
End: 2023-06-07
Payer: MEDICARE

## 2023-06-07 ENCOUNTER — OUTPATIENT (OUTPATIENT)
Dept: OUTPATIENT SERVICES | Facility: HOSPITAL | Age: 63
LOS: 1 days | End: 2023-06-07
Payer: MEDICARE

## 2023-06-07 DIAGNOSIS — C64.9 MALIGNANT NEOPLASM OF UNSPECIFIED KIDNEY, EXCEPT RENAL PELVIS: ICD-10-CM

## 2023-06-07 DIAGNOSIS — Z95.810 PRESENCE OF AUTOMATIC (IMPLANTABLE) CARDIAC DEFIBRILLATOR: Chronic | ICD-10-CM

## 2023-06-07 PROCEDURE — 74183 MRI ABD W/O CNTR FLWD CNTR: CPT

## 2023-06-07 PROCEDURE — 74183 MRI ABD W/O CNTR FLWD CNTR: CPT | Mod: 26

## 2023-06-07 PROCEDURE — 71046 X-RAY EXAM CHEST 2 VIEWS: CPT

## 2023-06-07 PROCEDURE — 71046 X-RAY EXAM CHEST 2 VIEWS: CPT | Mod: 26

## 2023-06-07 PROCEDURE — A9585: CPT

## 2023-06-18 NOTE — HISTORY OF PRESENT ILLNESS
[FreeTextEntry1] : : Aug 27 1960 \par Referring Provider: none \par \par HPI: Mr. YESI ESPAÑA is a 62 year yo M with a PMHx notable for HTN, CAD on ASA/Plavix. He has history of kidney stones. He presented to the emergency department at [facility] on  with left flank pain and gross hematuria. A non contrast CT demonstrated an 8 cm left upper pole renal mass, 1 cm para-aortic LN. There were also two parenchymal nodules in the left lower lobe of the lung. He was discharged to home. No longer with flank pain, hematuria. He smoked for ~10 years, approx 1 pack per week but quit 30 years ago. He is a former  but now retired.  No headaches / vision changes, no bony pain. \par \par CT chest with two pulmonary nodules 9 mm and 6 mm stable in appearance. \par \par He saw his cardiologist and demonstrated nuclear stress test with EF 51% and no evidence of ischmeia. He was told to stop his plavix 1 week prior to OR.\par \par Anticoagulation: Aspirin / plavix\par All: NKDA\par Social: Occasional EtOH. Smoked for ~10 years, 1 pack / week. Quit ~30 years ago. He is on disability due to his medical comorbidities\par PMHx: CAD s/p PCI , AICD \par PSHx: No abdominal surgeries\par FHx: Father with prostate -  of prostate cancer. He was 88. No family history of kidney cancer\par Labs: \par 10/1/2022:  Creatinine 1.54; Hemoglobin: 12.8; Platelet count: 316\par AST: 14; ALT: 12; Calcium: 8.9\par \par Imaging: CT Urogram and CT chest in PACS with large L upper pole mass with possible adherence to the spleen.\par \par :\par Here today s/p RAL L Nx, adrenalectomy and RPLND with pathology with pT3a ccRCC G2 N0 (). L testicular discomfort resolved. Saw Dr. Cueto for adjuvant immunotherapy. He was supposed to get 5 cycles. CT chest  had some stable left lower lobe nodules and CT AP due for end of May, ordered. He was noted to also have hypothyroid from the immunotherapy. Will check labs. UA without blood and protein. Also had FRANKLYN from the immunotherapy as well, has been stopped for a while now. \par  [Urinary Incontinence] : no urinary incontinence [Urinary Retention] : no urinary retention [Urinary Urgency] : no urinary urgency [Urinary Frequency] : no urinary frequency [Hematuria - Gross] : no gross hematuria

## 2023-06-18 NOTE — PHYSICAL EXAM
[General Appearance - Well Developed] : well developed [General Appearance - Well Nourished] : well nourished [Bowel Sounds] : normal bowel sounds [Abdomen Soft] : soft [Skin Color & Pigmentation] : normal skin color and pigmentation [Skin Turgor] : supple [Heart Rate And Rhythm] : Heart rate and rhythm were normal [] : no respiratory distress [Respiration, Rhythm And Depth] : normal respiratory rhythm and effort [Oriented To Time, Place, And Person] : oriented to person, place, and time [Not Anxious] : not anxious [Normal Station and Gait] : the gait and station were normal for the patient's age [No Focal Deficits] : no focal deficits [FreeTextEntry1] : incisions well healed

## 2023-07-25 NOTE — ED PROVIDER NOTE - CHIEF COMPLAINT
Randell Li, patient's daughter, and scheduled follow up with Dr. Whitlock on 8/25.   The patient is a 57y Male complaining of

## 2023-07-31 ENCOUNTER — OUTPATIENT (OUTPATIENT)
Dept: OUTPATIENT SERVICES | Facility: HOSPITAL | Age: 63
LOS: 1 days | Discharge: HOME | End: 2023-07-31

## 2023-07-31 DIAGNOSIS — C64.9 MALIGNANT NEOPLASM OF UNSPECIFIED KIDNEY, EXCEPT RENAL PELVIS: ICD-10-CM

## 2023-07-31 DIAGNOSIS — Z95.810 PRESENCE OF AUTOMATIC (IMPLANTABLE) CARDIAC DEFIBRILLATOR: Chronic | ICD-10-CM

## 2023-08-02 ENCOUNTER — RESULT REVIEW (OUTPATIENT)
Age: 63
End: 2023-08-02

## 2023-08-02 ENCOUNTER — APPOINTMENT (OUTPATIENT)
Dept: HEMATOLOGY ONCOLOGY | Facility: CLINIC | Age: 63
End: 2023-08-02
Payer: MEDICARE

## 2023-08-02 VITALS
SYSTOLIC BLOOD PRESSURE: 154 MMHG | RESPIRATION RATE: 16 BRPM | DIASTOLIC BLOOD PRESSURE: 93 MMHG | BODY MASS INDEX: 34.93 KG/M2 | WEIGHT: 209.64 LBS | HEART RATE: 60 BPM | HEIGHT: 65 IN | OXYGEN SATURATION: 97 % | TEMPERATURE: 97.7 F

## 2023-08-02 LAB
BASOPHILS # BLD AUTO: 0.06 K/UL — SIGNIFICANT CHANGE UP (ref 0–0.2)
BASOPHILS NFR BLD AUTO: 1 % — SIGNIFICANT CHANGE UP (ref 0–2)
EOSINOPHIL # BLD AUTO: 0.27 K/UL — SIGNIFICANT CHANGE UP (ref 0–0.5)
EOSINOPHIL NFR BLD AUTO: 4.6 % — SIGNIFICANT CHANGE UP (ref 0–6)
HCT VFR BLD CALC: 40.9 % — SIGNIFICANT CHANGE UP (ref 39–50)
HGB BLD-MCNC: 14 G/DL — SIGNIFICANT CHANGE UP (ref 13–17)
IMM GRANULOCYTES NFR BLD AUTO: 0.5 % — SIGNIFICANT CHANGE UP (ref 0–0.9)
LYMPHOCYTES # BLD AUTO: 1.79 K/UL — SIGNIFICANT CHANGE UP (ref 1–3.3)
LYMPHOCYTES # BLD AUTO: 30.4 % — SIGNIFICANT CHANGE UP (ref 13–44)
MCHC RBC-ENTMCNC: 29.9 PG — SIGNIFICANT CHANGE UP (ref 27–34)
MCHC RBC-ENTMCNC: 34.2 G/DL — SIGNIFICANT CHANGE UP (ref 32–36)
MCV RBC AUTO: 87.4 FL — SIGNIFICANT CHANGE UP (ref 80–100)
MONOCYTES # BLD AUTO: 0.46 K/UL — SIGNIFICANT CHANGE UP (ref 0–0.9)
MONOCYTES NFR BLD AUTO: 7.8 % — SIGNIFICANT CHANGE UP (ref 2–14)
NEUTROPHILS # BLD AUTO: 3.27 K/UL — SIGNIFICANT CHANGE UP (ref 1.8–7.4)
NEUTROPHILS NFR BLD AUTO: 55.7 % — SIGNIFICANT CHANGE UP (ref 43–77)
NRBC # BLD: 0 /100 WBCS — SIGNIFICANT CHANGE UP (ref 0–0)
PLATELET # BLD AUTO: 146 K/UL — LOW (ref 150–400)
RBC # BLD: 4.68 M/UL — SIGNIFICANT CHANGE UP (ref 4.2–5.8)
RBC # FLD: 13.6 % — SIGNIFICANT CHANGE UP (ref 10.3–14.5)
WBC # BLD: 5.88 K/UL — SIGNIFICANT CHANGE UP (ref 3.8–10.5)
WBC # FLD AUTO: 5.88 K/UL — SIGNIFICANT CHANGE UP (ref 3.8–10.5)

## 2023-08-02 PROCEDURE — 99214 OFFICE O/P EST MOD 30 MIN: CPT

## 2023-08-03 NOTE — HISTORY OF PRESENT ILLNESS
[de-identified] : Zac Aldridge is a 62 years old male\par  \par  initially presented to the emergency department at [facility] on October 1 with left flank pain and left lower quadrant  and gross hematuria. \par  \par  10/11/22 A non contrast CT abdomen and pelvis demonstrated an 8 cm left upper pole renal mass, 1 cm para-aortic LN. There were also two parenchymal nodules in the left lower lobe of the lung. CT chest revealed stable 6mm and 9mm left lower lung nodules.   \par  \par  10/31/22 underwent RAL radical nephrectomy, adrenalectomy, and node dissection, ccRCC grade 2, T3a with involvement of perirenal fat, 17 nodes all negative, margins negative and LVI negative.\par  \par  His gross hematuria was resolved after surgery. \par  \par  Now denies left flank pain and LLQ pain, feels overall fine.  \par  \par  12/30/22: today he feels well. appetite and energy unchanged. no rash, joint pain, CP, abn pain, N/V/D. no difficulty or reaction to first treatment. he does have intermittent episodes where he feels that he can't catch his breath. this has been going on for over a year and is not associated with exertion or anxiety. he has no HUBBARD or CP with exertion. he reports that taking 2-3 deep breaths resolves the issue. told him to continue to monitor and let us know if it is worsening \par  \par  1/23/23: C3 keytruda. Patient has been feeling well, denies symptoms or side effects. His TSH low and T4 high on last treatment denies excessive sweating, palpitations, jittery, changes in hair and nails. He stopped taking omeprazole and is now taking pepcid. He has not seen his cardiologist, Dr. Cain, since before his nephrectomy and his is planning to go back soon. Patient denies fever, chills, headache, vision changes, cough, shortness of breath, chest pain, palpitations, abdominal pain, nausea/vomiting, diarrhea, constipation, dysuria, hematuria, itching, rashes, joint pain, mucositis, changes in sensation.\par  \par  2/13/23: C4 keytruda. Has been feeling relatively well. Denies fatigue and has good appetite. He does report that about a week after treatment he noticed a rash on his upper back, chest and forehead. This rash was not itchy, he used Jenniffer cream. \par  \par  3/6/23 C5 keytruda today, feels overall fine. \par   [de-identified] : ccRCC [de-identified] : 3/27/23: C6 keytruda. Last doseon 3/6. He had his nephrectomy on 10/31/22, when he presented to us in November his Cr was between 1.5 and 1.8. On 3/6 his Cr was 2.01, he was brought in for IVF, Cr inc to 2.17 post hydration, FeNa 5.7% c/w post obstructive etiology. Renal ultrasound normal. Seen by renal, biomarkers not consistent with immunotherapy induced nephritis. Reviewed CT chest 3/18 with patient: Stable left lower lobe nodules when compared with prior. Minimal bilateral lower lobe groundglass opacities felt to be related to expiratory phase of scanning. Alternatively this may reflect early changes related to immunotherapy.  8/2/23: patient was receiving adjuvant keytruda of ccRCC,  he completed 5 cycles as of 3/6/23 and then was lost to follow up. Our team had reached out to him and was told by a family member that he was out of the country and he would call us to reschedule when he returned, however he never followed up. We received a refill request for pt's synthroid and discussed that patient needed to be seen.   In March 2023: Last dose on 3/6. He had his nephrectomy on 10/31/22, when he presented to us in November his Cr was between 1.5 and 1.8. On 3/6 his Cr was 2.01, he was brought in for IVF, Cr inc to 2.17 post hydration, FeNa 5.7% c/w post obstructive etiology. Renal ultrasound normal. Seen by renal, biomarkers not consistent with immunotherapy induced nephritis. Reviewed CT chest 3/18 with patient: Stable left lower lobe nodules when compared with prior. Minimal bilateral lower lobe groundglass opacities felt to be related to expiratory phase of scanning.  Patient said he was told by Renal and Urology to stop immunotherapy, he never communicated further with the Oncology team

## 2023-08-03 NOTE — ASSESSMENT
[FreeTextEntry1] : 62 year old male initially presented left flank pain and gross hematuria and was found to have an 8cm left renal mass s/p radical nephrectomy, pT3a clear cell RCC, grade 2, negative nodes. CT chest revealed two subcentimeter nodules. He is likely to have stage III (pT3aN0). His two subcentimeter nodules in the LLL will be monitored and currently are too small to be characterized. Given his T3a disease with high risk of recurrence. Keynote 564 demonstrated adjuvant keytruda for 1 year vs placebo significantly prolonged DFS. He started keytruda in December 2022. Patient was receiving adjuvant keytruda of ccRCC,  he completed 5 cycles as of 3/6/23 and then was lost to follow up. Our team had reached out to him and was told by a family member that he was out of the country and he would call us to reschedule when he returned, however he never followed up. Imaging in March 2023, CT Chest showed stable LLL nodules and b/l lower lobe ground glass opacities, MR AP showed no evidence of recurrence or metastasis. MR AP June 2023 no evidence of recurrence or metastasis.   Plan:  ccRCC - received 5 cycles of keytruda Dec 2022 - March 2023, will not resume  - MR AP June 2023 no evidence of recurrence or metastasis, next imaging in 6 months  - CT chest 3/18 with patient: Stable left lower lobe nodules when compared with prior. Minimal bilateral lower lobe groundglass opacities felt to be related to expiratory phase of scanning. Alternatively this may reflect early changes related to immunotherapy. -- ordered repeat CT chest to be done now   CKD - continue to follow with Dr. Covington  - avoid nephrotoxins    Hypothyroid  - immunotherapy induced  - has been on  synthroid to 50mcg daily, f/u TFTs today and adjust dose as needed  - encouraged pt to follow up with PCP   Instructed to contact our office with any new/worsening symptoms. Patient educated regarding plan of care, all questions/concerns addressed to the best of my abilities and patient's apparent satisfaction. RTC 3 months

## 2023-08-03 NOTE — REVIEW OF SYSTEMS
[Fever] : no fever [Chills] : no chills [Fatigue] : no fatigue [Dysphagia] : no dysphagia [Mucosal Pain] : no mucosal pain [Chest Pain] : no chest pain [Palpitations] : no palpitations [Lower Ext Edema] : no lower extremity edema [Shortness Of Breath] : no shortness of breath [Cough] : no cough [Abdominal Pain] : no abdominal pain [Vomiting] : no vomiting [Constipation] : no constipation [Dysuria] : no dysuria [Joint Pain] : no joint pain [Muscle Pain] : no muscle pain [Skin Rash] : no skin rash [Dizziness] : no dizziness [Difficulty Walking] : no difficulty walking [Easy Bleeding] : no tendency for easy bleeding [Easy Bruising] : no tendency for easy bruising

## 2023-08-03 NOTE — PHYSICAL EXAM
[Fully active, able to carry on all pre-disease performance without restriction] : Status 0 - Fully active, able to carry on all pre-disease performance without restriction [Normal] : normal appearance, no rash, nodules, vesicles, ulcers, erythema [de-identified] : anicteric  [de-identified] : supple, non tender, FROM  [de-identified] : AICD L chest [de-identified] : no edema  [de-identified] : well healed surgical scars

## 2023-08-04 ENCOUNTER — NON-APPOINTMENT (OUTPATIENT)
Age: 63
End: 2023-08-04

## 2023-08-04 LAB
ALBUMIN SERPL ELPH-MCNC: 4.9 G/DL
ALP BLD-CCNC: 53 U/L
ALT SERPL-CCNC: 28 U/L
ANION GAP SERPL CALC-SCNC: 12 MMOL/L
AST SERPL-CCNC: 26 U/L
BILIRUB SERPL-MCNC: 0.4 MG/DL
BUN SERPL-MCNC: 23 MG/DL
CALCIUM SERPL-MCNC: 9.5 MG/DL
CHLORIDE SERPL-SCNC: 99 MMOL/L
CO2 SERPL-SCNC: 25 MMOL/L
CREAT SERPL-MCNC: 1.94 MG/DL
EGFR: 38 ML/MIN/1.73M2
GLUCOSE SERPL-MCNC: 85 MG/DL
POTASSIUM SERPL-SCNC: 5.1 MMOL/L
PROT SERPL-MCNC: 7.4 G/DL
SODIUM SERPL-SCNC: 136 MMOL/L
T4 FREE SERPL-MCNC: 0.3 NG/DL
TSH SERPL-ACNC: 72.9 UIU/ML

## 2023-08-14 ENCOUNTER — APPOINTMENT (OUTPATIENT)
Dept: ENDOCRINOLOGY | Facility: CLINIC | Age: 63
End: 2023-08-14
Payer: MEDICARE

## 2023-08-14 ENCOUNTER — NON-APPOINTMENT (OUTPATIENT)
Age: 63
End: 2023-08-14

## 2023-08-14 VITALS
WEIGHT: 213 LBS | HEART RATE: 74 BPM | BODY MASS INDEX: 35.45 KG/M2 | SYSTOLIC BLOOD PRESSURE: 128 MMHG | DIASTOLIC BLOOD PRESSURE: 79 MMHG

## 2023-08-14 PROCEDURE — 99205 OFFICE O/P NEW HI 60 MIN: CPT

## 2023-08-14 NOTE — PHYSICAL EXAM
[No Acute Distress] : no acute distress [Normal Sclera/Conjunctiva] : normal sclera/conjunctiva [No Proptosis] : no proptosis [Normal Outer Ear/Nose] : the ears and nose were normal in appearance [No Neck Mass] : no neck mass was observed [Clear to Auscultation] : lungs were clear to auscultation bilaterally [Normal Rate] : heart rate was normal [Regular Rhythm] : with a regular rhythm [No Edema] : no peripheral edema [Soft] : abdomen soft [Spine Straight] : spine straight [Normal Gait] : normal gait [Normal Strength/Tone] : muscle strength and tone were normal [No Rash] : no rash [Normal Reflexes] : deep tendon reflexes were 2+ and symmetric [No Tremors] : no tremors [Oriented x3] : oriented to person, place, and time

## 2023-08-14 NOTE — REASON FOR VISIT
[Initial Evaluation] : an initial evaluation [Hypothyroidism] : hypothyroidism [Family Member] : family member

## 2023-08-15 NOTE — REVIEW OF SYSTEMS
[Negative] : Heme/Lymph [Recent Weight Loss (___ Lbs)] : recent weight loss: [unfilled] lbs [As Noted in HPI] : as noted in HPI [Constipation] : no constipation [Polyuria] : no polyuria [Dizziness] : no dizziness [Polydipsia] : no polydipsia [Cold Intolerance] : no cold intolerance [FreeTextEntry2] : denies weakness, tiredness [FreeTextEntry4] : denies voice changes [de-identified] : denies fainting episodes

## 2023-08-15 NOTE — HISTORY OF PRESENT ILLNESS
[FreeTextEntry1] : 62-year-old M pt, with Hx of hypothyroidism, referred from oncology, presents today to establish endocrine care. Other PMHx: renal cell carcinoma, CKD, HTN PSHx: left nephrectomy/adrenalectomy (10/2022), defibrillator (~5 years ago) No FHx of: thyroid disease SHx: Non-smoker, no EtOH use NKDA   08/14/2023 Pt has /79 and BMI 35.45. He gained 5 lbs in 4 months.  Pt is here for hypothyroidism management CC: "I was told by my physician that my thyroid was abnormal" Pt accompanied by family member. Pt has no physical complaints overall. Denies weakness, constipation, cold intolerance, voice changes, no lightheadedness, dizziness, fainting episodes, polyuria, and polydipsia. Pt endorsees weight loss.  October of last year patient had hematuria, work up reveled a left renal mass. He underwent surgery, left nephrectomy and adrenalectomy. Patho: Clear cell carcinoma. Pat received monoclonal ab,Pembrolizumab since December/January 2023 Of note in [facility] patient TSH was low and T4 high. Recently, 10 days ago, his levothyroxine dose was increased from 50 µg to 75 µg daily.           [id number]	 [id number] TSH          72.9                   66.2                 57.0              2.44                    0.02                  1.41 T4 free          0.3              0.3                       <0.1               0.6                       3.4                   1.0  Review of pt's chart: - 10/31/22 Surgical Pathology Report: Clear cell renal carcinoma. Specimen: kidney, adrenal gland, and retroperitoneal lymph nodes   [Medications verified as per pt on 08/14/2023]  Current Medications: Levothyroxine 75 mcg qd  (place on 50 mcg 2 months ago; 2 weeks ago increased to 75 mcg), Carvedilol 25, Entresto 49-51 qd, ASA 81 mg qd

## 2023-08-15 NOTE — END OF VISIT
[FreeTextEntry3] : All medical record entries made by the Scribe were at my, Dr. Dorian Flores, direction and personally dictated by me on 08/14/2023. I have reviewed the chart and agree that the record accurately reflects my personal performance of the history, physical exam, assessment and plan. I have also personally directed, reviewed and agreed with the chart.

## 2023-08-15 NOTE — ADDENDUM
[FreeTextEntry1] : I, Krystal Duque, act soley as a scribe for Dr. Dorian Flores on this date. 08/14/2023

## 2023-08-20 ENCOUNTER — APPOINTMENT (OUTPATIENT)
Dept: CT IMAGING | Facility: IMAGING CENTER | Age: 63
End: 2023-08-20
Payer: MEDICARE

## 2023-08-20 ENCOUNTER — OUTPATIENT (OUTPATIENT)
Dept: OUTPATIENT SERVICES | Facility: HOSPITAL | Age: 63
LOS: 1 days | End: 2023-08-20
Payer: COMMERCIAL

## 2023-08-20 DIAGNOSIS — Z95.810 PRESENCE OF AUTOMATIC (IMPLANTABLE) CARDIAC DEFIBRILLATOR: Chronic | ICD-10-CM

## 2023-08-20 DIAGNOSIS — Z00.8 ENCOUNTER FOR OTHER GENERAL EXAMINATION: ICD-10-CM

## 2023-08-20 DIAGNOSIS — C64.9 MALIGNANT NEOPLASM OF UNSPECIFIED KIDNEY, EXCEPT RENAL PELVIS: ICD-10-CM

## 2023-08-20 PROCEDURE — 71250 CT THORAX DX C-: CPT | Mod: 26

## 2023-08-20 PROCEDURE — 71250 CT THORAX DX C-: CPT

## 2023-08-28 LAB
ACTH SER-ACNC: 35.1 PG/ML
ALBUMIN SERPL ELPH-MCNC: 4.9 G/DL
ALP BLD-CCNC: 48 U/L
ALT SERPL-CCNC: 19 U/L
ANION GAP SERPL CALC-SCNC: 11 MMOL/L
AST SERPL-CCNC: 21 U/L
BILIRUB SERPL-MCNC: 0.5 MG/DL
BUN SERPL-MCNC: 29 MG/DL
CALCIUM SERPL-MCNC: 9.6 MG/DL
CHLORIDE SERPL-SCNC: 102 MMOL/L
CO2 SERPL-SCNC: 26 MMOL/L
CORTIS SERPL-MCNC: 8.4 UG/DL
CREAT SERPL-MCNC: 1.95 MG/DL
EGFR: 38 ML/MIN/1.73M2
ESTIMATED AVERAGE GLUCOSE: 114 MG/DL
GLUCOSE SERPL-MCNC: 89 MG/DL
HBA1C MFR BLD HPLC: 5.6 %
POTASSIUM SERPL-SCNC: 5.2 MMOL/L
PROT SERPL-MCNC: 7.3 G/DL
SODIUM SERPL-SCNC: 139 MMOL/L
T3 SERPL-MCNC: 33 NG/DL
T4 FREE SERPL-MCNC: 0.6 NG/DL
THYROGLOB AB SERPL-ACNC: 224 IU/ML
THYROPEROXIDASE AB SERPL IA-ACNC: 227 IU/ML
TSH SERPL-ACNC: 56.1 UIU/ML

## 2023-09-13 ENCOUNTER — APPOINTMENT (OUTPATIENT)
Dept: ENDOCRINOLOGY | Facility: CLINIC | Age: 63
End: 2023-09-13
Payer: MEDICARE

## 2023-09-13 DIAGNOSIS — Z80.0 FAMILY HISTORY OF MALIGNANT NEOPLASM OF DIGESTIVE ORGANS: ICD-10-CM

## 2023-09-13 PROCEDURE — 99205 OFFICE O/P NEW HI 60 MIN: CPT | Mod: 95

## 2023-10-26 LAB
ALBUMIN SERPL ELPH-MCNC: 4.8 G/DL
ALP BLD-CCNC: 69 U/L
ALT SERPL-CCNC: 22 U/L
ANION GAP SERPL CALC-SCNC: 11 MMOL/L
AST SERPL-CCNC: 18 U/L
BILIRUB SERPL-MCNC: 0.4 MG/DL
BUN SERPL-MCNC: 23 MG/DL
CALCIUM SERPL-MCNC: 9.8 MG/DL
CHLORIDE SERPL-SCNC: 101 MMOL/L
CO2 SERPL-SCNC: 25 MMOL/L
CREAT SERPL-MCNC: 1.77 MG/DL
EGFR: 43 ML/MIN/1.73M2
GLUCOSE SERPL-MCNC: 96 MG/DL
POTASSIUM SERPL-SCNC: 5.6 MMOL/L
PROT SERPL-MCNC: 7.8 G/DL
SODIUM SERPL-SCNC: 137 MMOL/L
T3 SERPL-MCNC: 93 NG/DL
T4 FREE SERPL-MCNC: 1.7 NG/DL
TSH SERPL-ACNC: 4.34 UIU/ML

## 2023-10-28 ENCOUNTER — OUTPATIENT (OUTPATIENT)
Dept: OUTPATIENT SERVICES | Facility: HOSPITAL | Age: 63
LOS: 1 days | Discharge: HOME | End: 2023-10-28

## 2023-10-28 DIAGNOSIS — C64.9 MALIGNANT NEOPLASM OF UNSPECIFIED KIDNEY, EXCEPT RENAL PELVIS: ICD-10-CM

## 2023-10-28 DIAGNOSIS — Z95.810 PRESENCE OF AUTOMATIC (IMPLANTABLE) CARDIAC DEFIBRILLATOR: Chronic | ICD-10-CM

## 2023-11-01 ENCOUNTER — APPOINTMENT (OUTPATIENT)
Dept: HEMATOLOGY ONCOLOGY | Facility: CLINIC | Age: 63
End: 2023-11-01

## 2023-11-02 ENCOUNTER — APPOINTMENT (OUTPATIENT)
Dept: ENDOCRINOLOGY | Facility: CLINIC | Age: 63
End: 2023-11-02
Payer: MEDICARE

## 2023-11-02 VITALS
HEART RATE: 83 BPM | RESPIRATION RATE: 16 BRPM | DIASTOLIC BLOOD PRESSURE: 81 MMHG | TEMPERATURE: 97.8 F | SYSTOLIC BLOOD PRESSURE: 123 MMHG | OXYGEN SATURATION: 98 % | HEIGHT: 65 IN | BODY MASS INDEX: 34.66 KG/M2 | WEIGHT: 208 LBS

## 2023-11-02 PROCEDURE — 99215 OFFICE O/P EST HI 40 MIN: CPT

## 2023-11-02 RX ORDER — LEVOTHYROXINE SODIUM 0.07 MG/1
75 TABLET ORAL DAILY
Qty: 30 | Refills: 5 | Status: DISCONTINUED | COMMUNITY
Start: 2023-02-14 | End: 2023-11-02

## 2023-11-06 ENCOUNTER — APPOINTMENT (OUTPATIENT)
Dept: MRI IMAGING | Facility: CLINIC | Age: 63
End: 2023-11-06

## 2023-11-17 ENCOUNTER — APPOINTMENT (OUTPATIENT)
Dept: UROLOGY | Facility: CLINIC | Age: 63
End: 2023-11-17
Payer: MEDICARE

## 2023-11-17 VITALS
DIASTOLIC BLOOD PRESSURE: 78 MMHG | HEART RATE: 68 BPM | BODY MASS INDEX: 34.99 KG/M2 | SYSTOLIC BLOOD PRESSURE: 130 MMHG | TEMPERATURE: 98.4 F | HEIGHT: 65 IN | WEIGHT: 210 LBS | RESPIRATION RATE: 17 BRPM

## 2023-11-17 DIAGNOSIS — Z12.5 ENCOUNTER FOR SCREENING FOR MALIGNANT NEOPLASM OF PROSTATE: ICD-10-CM

## 2023-11-17 PROCEDURE — 99214 OFFICE O/P EST MOD 30 MIN: CPT

## 2023-11-19 LAB — PSA SERPL-MCNC: 0.36 NG/ML

## 2023-12-01 ENCOUNTER — APPOINTMENT (OUTPATIENT)
Dept: HEMATOLOGY ONCOLOGY | Facility: CLINIC | Age: 63
End: 2023-12-01
Payer: MEDICARE

## 2023-12-01 VITALS
DIASTOLIC BLOOD PRESSURE: 81 MMHG | HEART RATE: 73 BPM | RESPIRATION RATE: 16 BRPM | WEIGHT: 209.99 LBS | SYSTOLIC BLOOD PRESSURE: 127 MMHG | OXYGEN SATURATION: 98 % | TEMPERATURE: 97.8 F | BODY MASS INDEX: 34.95 KG/M2

## 2023-12-01 PROCEDURE — 99213 OFFICE O/P EST LOW 20 MIN: CPT

## 2023-12-14 ENCOUNTER — APPOINTMENT (OUTPATIENT)
Dept: ENDOCRINOLOGY | Facility: CLINIC | Age: 63
End: 2023-12-14

## 2023-12-27 ENCOUNTER — APPOINTMENT (OUTPATIENT)
Dept: HOME HEALTH SERVICES | Facility: HOME HEALTH | Age: 63
End: 2023-12-27
Payer: MEDICARE

## 2023-12-27 VITALS
HEART RATE: 64 BPM | RESPIRATION RATE: 18 BRPM | TEMPERATURE: 97.4 F | HEIGHT: 65 IN | WEIGHT: 213 LBS | BODY MASS INDEX: 35.49 KG/M2 | OXYGEN SATURATION: 98 %

## 2023-12-27 DIAGNOSIS — Z87.898 PERSONAL HISTORY OF OTHER SPECIFIED CONDITIONS: ICD-10-CM

## 2023-12-27 DIAGNOSIS — Z71.89 OTHER SPECIFIED COUNSELING: ICD-10-CM

## 2023-12-27 DIAGNOSIS — Z95.810 PRESENCE OF AUTOMATIC (IMPLANTABLE) CARDIAC DEFIBRILLATOR: ICD-10-CM

## 2023-12-27 DIAGNOSIS — E55.9 VITAMIN D DEFICIENCY, UNSPECIFIED: ICD-10-CM

## 2023-12-27 PROCEDURE — 99344 HOME/RES VST NEW MOD MDM 60: CPT | Mod: 25

## 2023-12-27 PROCEDURE — 99497 ADVNCD CARE PLAN 30 MIN: CPT

## 2023-12-27 RX ORDER — FAMOTIDINE 20 MG/1
20 TABLET, FILM COATED ORAL
Refills: 0 | Status: COMPLETED | COMMUNITY
Start: 2023-01-23 | End: 2023-12-27

## 2023-12-27 NOTE — REASON FOR VISIT
[Initial Evaluation] : an initial evaluation [Pre-Visit Preparation] : pre-visit preparation was done

## 2023-12-27 NOTE — COUNSELING
[Obese -  ( BMI  >29.9 )] : obese - ( BMI  >29.9 ) [Non - Smoker] : non-smoker [Smoke/CO Detectors] : smoke/CO detectors [Use grab bars] : use grab bars [Use assistive device to avoid falls] : use assistive device to avoid falls [] : foot exam [Improve exercise tolerance] : improve exercise tolerance [Improve mobility] : improve mobility [Improve weight] : improve weight [Improve pain control] : improve pain control [Decrease stress] : decrease stress [Decrease hospital use] : decrease hospital use [Minimize unnecessary interventions] : minimize unnecessary interventions [Comfort Care] : comfort care [Maintain functional ability] : maintain functional ability [Likely to achieve goals/desired outcomes] : likely to achieve goals/desired outcomes [Discussed disease trajectory with patient/caregiver] : discussed disease trajectory with patient/caregiver [Patient/Caregiver has ___ understanding of disease process] : patient/caregiver has [unfilled] understanding of disease process [Advanced Directives discussed: ____] : Advanced directives discussed: [unfilled] [Completed Healthcare Proxy] : completed healthcare proxy [Full Code] : Code Status: Full Code [No Limitations] : Treatment Guidelines: No limitations [Long Term Intubation] : Intubation: Long term intubation [[facility] Ver[facility] Date: _____] [facility]/last verification date: [unfilled] [_____] : HCP: [unfilled] [FreeTextEntry3] : low sodium

## 2023-12-27 NOTE — HEALTH RISK ASSESSMENT
[HRA Reviewed] : Health risk assessment reviewed [Independent] : managing finances [Some assistance needed] : using transportation [No falls in past year] : Patient reported no falls in the past year [No] : The patient does not have visual impairment [TimeGetUpGo] : 4

## 2023-12-27 NOTE — HISTORY OF PRESENT ILLNESS
[[employer]-Management Patient] : [unfilled] is a [employer]s co-management patient [Patient is stable] : patient is stable [Education provided] : education provided [Medications adjusted] : medication adjusted [Patient] : patient [FreeTextEntry2] : PMH: L renal cell carcinoma. S/p radical L nephrectomy. S/p immunotherapy. S/p defibrillator placement AICD. CAD. HTN. CKD st. 3b. GERD. Hypothyroidism. Thyroid antibodies. Lungs nodules. Aortic calcification: Chest CT 09/04/2023.  The patient lives with supportive family in neat apartment. The patient has 5 children and 2 grandchildren. He was enrolled in [employer].  The patient went to PCP on 12/18/23 with heartburn, pantoprazole was prescribed. The patient was referred to colonoscopy and gastroscopy to GI Dr. Kelsey Sánchez, [phone].  The patient has an appointment w Endocrinology Dr. Finn Theodore on 12/28/23.  Medical Issues:  - L renal cell carcinoma. S/p radical L nephrectomy. S/p immunotherapy. Follow up with nephrologist and urologist every 6 mo. MRI is soon, pt is waiting authorization. - CAD/AICD: Aspirin 81 mg daily, Entresto 49-51 mg 1 tab BID, Plavix 75 mg daily. Follow up with cardiologist. - HTN: carvedilol 25 mg 1 tab BID. - Hypothyroidism/ Thyroid antibodies: Levothyroxine 100 mcg 1 tab daily. Follow up with endocrinologist. Will monitor. - GERD: Pantoprazole 20 mg 1 tab daily before breakfast. - HLD: Simvastatin 40 mg 1 tab daily. Will monitor. - Vitamin D deficiency: 2000 U 1 cap daily. - CKD st 3b: eGFR 43 on 10/25/1923: avoid nephrotoxic meds, follow up with nephrologist. - Aortic calcification on chest CT on 09/04/23. - Lungs nodules on chest CT on 09/04/23 - unchanged.      Specialists:  PCP Danilo Ramírez [phone] Nephrologist Dr. Nadya Zaragoza [phone]. Urologist Dr. Martin Walton [phone]. Cardiology Dr. Briseida Cooney [phone]. Hemoncologist Dr. Nory Gonzales [phone]. GI Dr. Kelsey Sánchez, [phone]. Endocrinology Dr. Finn Theodore      Social hx:  non-smoker.  Caregivers:  Spouse Yue [phone]  Subjective: -Appetite/weight: appetite is good. Weight is stable. -Gait/falls: Gait stable. No falls. -Pain: Back pain. Occurs with ambulation. Improves with rest.  -Sleep: sleeps wells. -BMs: regular no straining. -Urine: no issue. -Skin: intact -DME: cane. -Mood/memory: mood is good. Short memory is failed. -Communication: conversational, speaks Malay. minimal medical literacy -Health Maintenance:  -Hospitalizations in the past year: none.  MOLST:  Discussion of advance care planning. Total time spent: 35 min. Participants: patient, RN Amie Larson, NP Kelsi Castillo, Discussion: Goals of care, Advanced directives, Health care agency, and Disease trajectory. Completed MOLST form: DNR, DNI, DNH, artificial feeding, determine use of antibiotics, no Dialysis. Please see Advanced Care Planning order. Goal of care: primary focus for patient and family is comfort, patient would like to stay home for the days that remain for pt, also understanding some of the limitations to what we can supply at home in order for pt to meet the need to be comfortable. HCA: Spouse Yue is a Proxy. Disease Trajectory: Discussed and reviewed that patient will not likely functionally improve and will likely become more dependent on ADLs over time. Hospice Benefit also discussed as a potential benefit to patient in future once meeting criteria. Prognosis: not fully discussed at this time with patient and family, will discuss at our next visit a time line of prognosis.

## 2023-12-28 ENCOUNTER — APPOINTMENT (OUTPATIENT)
Dept: ENDOCRINOLOGY | Facility: CLINIC | Age: 63
End: 2023-12-28
Payer: MEDICARE

## 2023-12-28 VITALS
HEART RATE: 76 BPM | WEIGHT: 210 LBS | SYSTOLIC BLOOD PRESSURE: 133 MMHG | HEIGHT: 65 IN | TEMPERATURE: 97.8 F | OXYGEN SATURATION: 99 % | BODY MASS INDEX: 34.99 KG/M2 | DIASTOLIC BLOOD PRESSURE: 84 MMHG | RESPIRATION RATE: 16 BRPM

## 2023-12-28 DIAGNOSIS — R76.8 OTHER SPECIFIED ABNORMAL IMMUNOLOGICAL FINDINGS IN SERUM: ICD-10-CM

## 2023-12-28 DIAGNOSIS — Z92.89 PERSONAL HISTORY OF OTHER MEDICAL TREATMENT: ICD-10-CM

## 2023-12-28 PROCEDURE — 99215 OFFICE O/P EST HI 40 MIN: CPT

## 2023-12-28 NOTE — PHYSICAL EXAM
[Alert] : alert [Well Nourished] : well nourished [No Acute Distress] : no acute distress [Normal Sclera/Conjunctiva] : normal sclera/conjunctiva [EOMI] : extra ocular movement intact [No Proptosis] : no proptosis [Normal Oropharynx] : the oropharynx was normal [Thyroid Not Enlarged] : the thyroid was not enlarged [No Thyroid Nodules] : no palpable thyroid nodules [No Respiratory Distress] : no respiratory distress [No Accessory Muscle Use] : no accessory muscle use [Clear to Auscultation] : lungs were clear to auscultation bilaterally [Normal S1, S2] : normal S1 and S2 [Normal Rate] : heart rate was normal [Regular Rhythm] : with a regular rhythm [No Edema] : no peripheral edema [Pedal Pulses Normal] : the pedal pulses are present [Normal Bowel Sounds] : normal bowel sounds [Not Tender] : non-tender [Not Distended] : not distended [Soft] : abdomen soft [Normal Anterior Cervical Nodes] : no anterior cervical lymphadenopathy [No Spinal Tenderness] : no spinal tenderness [Spine Straight] : spine straight [No Stigmata of Cushings Syndrome] : no stigmata of Cushings Syndrome [Normal Gait] : normal gait [No Clubbing, Cyanosis] : no clubbing  or cyanosis of the fingernails [Normal Strength/Tone] : muscle strength and tone were normal [No Rash] : no rash [Acanthosis Nigricans] : no acanthosis nigricans [Normal Reflexes] : deep tendon reflexes were 2+ and symmetric [No Tremors] : no tremors [Oriented x3] : oriented to person, place, and time

## 2023-12-28 NOTE — HISTORY OF PRESENT ILLNESS
[FreeTextEntry1] : 63-year-old Male with PMH of hypothyroidism, renal clear cell carcinoma status post left nephrectomy/adrenalectomy (10/2022), currently on Pembrolizumab, defibrillator, CKD, hypertension is scheduled for hypothyroidism follow up  Chart review shows that his TSH levels in December 2022 was 1.41, in January 2023 was low 0.02. Became normal on 23 January 2020 2.44. Then on February 13 his TSH was 57, in March it was 66.20, in August it was 72.90 and on 14th August it is 56.10.  Patient states he was taking Pembrolizumab, but he has now stopped taking it because he thinks that his thyroid hormones are affected by Pembrolizumab. Current meds: Currently taking Levothyroxine 100 mcg daily in an empty stomach ( compliant since Aug 2023). Wait for 1 hour to take his breakfast. Most recent TSH is slightly above normal, improved from the past  ROS: yes, weight gain. no fatigue no hair loss No skin or nail changes no constipation

## 2023-12-28 NOTE — ASSESSMENT
[FreeTextEntry1] : Most recent TSH slightly above goal and improving from the past. Continue LT4 100 mcg daily. Discussed the appropriate administration technique. Counselled patient on compliance with levothyroxine for life-long Patient has normal vitals on prior vitals, Cortisol and ACTH within normal limit,  Repeat the TFTs now  RTC in March 2024.

## 2023-12-30 ENCOUNTER — OUTPATIENT (OUTPATIENT)
Dept: OUTPATIENT SERVICES | Facility: HOSPITAL | Age: 63
LOS: 1 days | End: 2023-12-30
Payer: COMMERCIAL

## 2023-12-30 ENCOUNTER — APPOINTMENT (OUTPATIENT)
Dept: CT IMAGING | Facility: IMAGING CENTER | Age: 63
End: 2023-12-30
Payer: MEDICARE

## 2023-12-30 DIAGNOSIS — Z95.810 PRESENCE OF AUTOMATIC (IMPLANTABLE) CARDIAC DEFIBRILLATOR: Chronic | ICD-10-CM

## 2023-12-30 DIAGNOSIS — E03.9 HYPOTHYROIDISM, UNSPECIFIED: ICD-10-CM

## 2023-12-30 PROCEDURE — 71250 CT THORAX DX C-: CPT

## 2023-12-30 PROCEDURE — 71250 CT THORAX DX C-: CPT | Mod: 26

## 2024-01-03 ENCOUNTER — APPOINTMENT (OUTPATIENT)
Dept: NEPHROLOGY | Facility: CLINIC | Age: 64
End: 2024-01-03
Payer: MEDICARE

## 2024-01-03 VITALS
DIASTOLIC BLOOD PRESSURE: 84 MMHG | TEMPERATURE: 97.2 F | OXYGEN SATURATION: 98 % | HEIGHT: 65 IN | SYSTOLIC BLOOD PRESSURE: 138 MMHG | HEART RATE: 67 BPM

## 2024-01-03 DIAGNOSIS — Z90.5 ACQUIRED ABSENCE OF KIDNEY: ICD-10-CM

## 2024-01-03 PROCEDURE — 99213 OFFICE O/P EST LOW 20 MIN: CPT

## 2024-01-04 LAB
APPEARANCE: CLEAR
BACTERIA: NEGATIVE /HPF
BILIRUBIN URINE: NEGATIVE
BLOOD URINE: NEGATIVE
CAST: 0 /LPF
COLOR: YELLOW
CREAT SPEC-SCNC: 132 MG/DL
EPITHELIAL CELLS: 0 /HPF
GLUCOSE QUALITATIVE U: NEGATIVE MG/DL
KETONES URINE: NEGATIVE MG/DL
LEUKOCYTE ESTERASE URINE: NEGATIVE
MICROALBUMIN 24H UR DL<=1MG/L-MCNC: <1.2 MG/DL
MICROALBUMIN/CREAT 24H UR-RTO: NORMAL MG/G
MICROSCOPIC-UA: NORMAL
NITRITE URINE: NEGATIVE
PH URINE: 6.5
PROTEIN URINE: NEGATIVE MG/DL
RED BLOOD CELLS URINE: 0 /HPF
SPECIFIC GRAVITY URINE: 1.02
UROBILINOGEN URINE: 1 MG/DL
WHITE BLOOD CELLS URINE: 0 /HPF

## 2024-01-04 NOTE — ASSESSMENT
[FreeTextEntry1] : Mr. Iqbal is a 64 y/o male with recently diagnosed renal cell ca here for post hospital visit.  He was admitted to the hospital in October 2022 for an elective Cystoscopy & Robotic Left Nephroureterectomy retroperitoneal lymph node dissection for a left upper pole renal mass which was done on 10/31/22. Prior to this, he had gross hematuria. Pathology was c/w RCC ( Stage 111). He was started on Keytruda on 12/5/2022. He completed 5 cycles as of 3/6/23 and then was lost to follow up.  Also started on synthroid  for thyroiditis. Imaging in March 2023, CT Chest showed stable LLL nodules and b/l lower lobe ground glass opacities, MR AP showed no evidence of recurrence or metastasis.   CKD stage 3 /Solitary Kidney: Baseline creatinine ( 1.4-1.7 mg./dl) CKD in the setting of long standing H/O HTN. His biomarker profile in the past was not c/w AIN. Kidney function at baseline. U/A bland with no blood or protein Continue to avoid Contrast, PPI, NSAIDS, herbal supplements Maintain adequate hydration  HTN: BP at goal Maintain on the current regimen         f/u in 4 months         .

## 2024-01-04 NOTE — HISTORY OF PRESENT ILLNESS
[FreeTextEntry1] :  Mr. Iqbal is a 64 y/o male with recently diagnosed renal cell ca here for  a follow up visit    He was admitted to the hospital in October 2022 for  an elective Cystoscopy & Robotic Left Nephroureterectomy retroperitoneal lymph node dissection  for a left upper pole renal mass which was  done on 10/31/22. Prior to this, he had gross hematuria. Pathology was c/w RCC ( Stage 111). He was started on Keytruda on 12/5/2022. He completed 5 cycles as of 3/6/23 and then was lost to follow up.  Also started on synthroid  for thyroiditis. Imaging in March 2023, CT Chest showed stable LLL nodules and b/l lower lobe ground glass opacities, MR AP showed no evidence of recurrence or metastasis. MR AP June 2023 no evidence of recurrence or metastasis.    His other medical history is significant for: HTN, Hyperlipidemia,  CAD (stents x4 on ASA/Plavix), AICD (4 years ago) and kidney stones.    Also, with CKD which was assumed to be secondary to HTN . Baseline creatinine in the  1.4-1.7 mg/dl range. Post surgery, he developed some FRANKLYN with peak creatinine of 1.8 which was assumed to be hemodynamically mediated due to surgery. Recently his creatinine is down to his baseline in the 1.5 mg/dl. Proteinuria of 0.8 gms in the hospital   Comes for a follow up visit today.   Feels well

## 2024-01-04 NOTE — PHYSICAL EXAM
[General Appearance - Alert] : alert [General Appearance - In No Acute Distress] : in no acute distress [Neck Appearance] : the appearance of the neck was normal [Exaggerated Use Of Accessory Muscles For Inspiration] : no accessory muscle use [Auscultation Breath Sounds / Voice Sounds] : lungs were clear to auscultation bilaterally [Chest Palpation] : palpation of the chest revealed no abnormalities [Apical Impulse] : the apical impulse was normal [Heart Sounds] : normal S1 and S2 [Heart Sounds Gallop] : no gallops [Murmurs] : no murmurs [Heart Sounds Pericardial Friction Rub] : no pericardial rub [Edema] : there was no peripheral edema [No CVA Tenderness] : no ~M costovertebral angle tenderness [Involuntary Movements] : no involuntary movements were seen [Skin Color & Pigmentation] : normal skin color and pigmentation [] : no rash [No Focal Deficits] : no focal deficits [Oriented To Time, Place, And Person] : oriented to person, place, and time [Impaired Insight] : insight and judgment were intact [Affect] : the affect was normal [FreeTextEntry1] : overweight

## 2024-01-12 ENCOUNTER — APPOINTMENT (OUTPATIENT)
Dept: MRI IMAGING | Facility: HOSPITAL | Age: 64
End: 2024-01-12
Payer: MEDICARE

## 2024-01-12 ENCOUNTER — OUTPATIENT (OUTPATIENT)
Dept: OUTPATIENT SERVICES | Facility: HOSPITAL | Age: 64
LOS: 1 days | End: 2024-01-12
Payer: MEDICARE

## 2024-01-12 ENCOUNTER — APPOINTMENT (OUTPATIENT)
Dept: ENDOCRINOLOGY | Facility: CLINIC | Age: 64
End: 2024-01-12
Payer: MEDICARE

## 2024-01-12 DIAGNOSIS — C64.9 MALIGNANT NEOPLASM OF UNSPECIFIED KIDNEY, EXCEPT RENAL PELVIS: ICD-10-CM

## 2024-01-12 DIAGNOSIS — Z95.810 PRESENCE OF AUTOMATIC (IMPLANTABLE) CARDIAC DEFIBRILLATOR: Chronic | ICD-10-CM

## 2024-01-12 DIAGNOSIS — E03.9 HYPOTHYROIDISM, UNSPECIFIED: ICD-10-CM

## 2024-01-12 PROCEDURE — 74183 MRI ABD W/O CNTR FLWD CNTR: CPT | Mod: 26

## 2024-01-12 PROCEDURE — 71046 X-RAY EXAM CHEST 2 VIEWS: CPT

## 2024-01-12 PROCEDURE — 99441: CPT

## 2024-01-12 PROCEDURE — 71046 X-RAY EXAM CHEST 2 VIEWS: CPT | Mod: 26

## 2024-01-12 PROCEDURE — 74183 MRI ABD W/O CNTR FLWD CNTR: CPT

## 2024-01-13 LAB
ALBUMIN SERPL ELPH-MCNC: 4.6 G/DL
ALP BLD-CCNC: 69 U/L
ALT SERPL-CCNC: 15 U/L
ANION GAP SERPL CALC-SCNC: 12 MMOL/L
AST SERPL-CCNC: 16 U/L
BILIRUB SERPL-MCNC: 0.5 MG/DL
BUN SERPL-MCNC: 18 MG/DL
CALCIUM SERPL-MCNC: 9 MG/DL
CHLORIDE SERPL-SCNC: 103 MMOL/L
CO2 SERPL-SCNC: 24 MMOL/L
CREAT SERPL-MCNC: 1.72 MG/DL
EGFR: 44 ML/MIN/1.73M2
GLUCOSE SERPL-MCNC: 81 MG/DL
POTASSIUM SERPL-SCNC: 4.9 MMOL/L
PROT SERPL-MCNC: 7 G/DL
SODIUM SERPL-SCNC: 140 MMOL/L
T3 SERPL-MCNC: 65 NG/DL
T4 FREE SERPL-MCNC: 1.5 NG/DL
TSH SERPL-ACNC: 5.26 UIU/ML

## 2024-02-13 ENCOUNTER — APPOINTMENT (OUTPATIENT)
Dept: HOME HEALTH SERVICES | Facility: HOME HEALTH | Age: 64
End: 2024-02-13

## 2024-02-13 RX ORDER — CLOPIDOGREL 75 MG/1
75 TABLET, FILM COATED ORAL DAILY
Qty: 90 | Refills: 3 | Status: ACTIVE | COMMUNITY
Start: 2022-12-22

## 2024-02-13 RX ORDER — ASPIRIN 81 MG/1
81 TABLET, CHEWABLE ORAL
Qty: 30 | Refills: 3 | Status: ACTIVE | COMMUNITY
Start: 2023-08-02

## 2024-02-13 RX ORDER — ADHESIVE TAPE 3"X 2.3 YD
50 MCG TAPE, NON-MEDICATED TOPICAL
Qty: 30 | Refills: 2 | Status: ACTIVE | COMMUNITY
Start: 2023-03-15

## 2024-02-13 RX ORDER — SIMVASTATIN 40 MG/1
40 TABLET, FILM COATED ORAL DAILY
Qty: 30 | Refills: 0 | Status: ACTIVE | COMMUNITY
Start: 2022-10-17

## 2024-02-13 RX ORDER — SACUBITRIL AND VALSARTAN 49; 51 MG/1; MG/1
49-51 TABLET, FILM COATED ORAL TWICE DAILY
Refills: 0 | Status: ACTIVE | COMMUNITY
Start: 2022-10-17

## 2024-02-13 RX ORDER — LEVOTHYROXINE SODIUM 0.1 MG/1
100 TABLET ORAL
Qty: 90 | Refills: 1 | Status: ACTIVE | COMMUNITY
Start: 2023-09-13

## 2024-02-13 RX ORDER — CARVEDILOL 25 MG/1
25 TABLET, FILM COATED ORAL
Qty: 180 | Refills: 1 | Status: ACTIVE | COMMUNITY
Start: 2022-10-17

## 2024-02-13 RX ORDER — PANTOPRAZOLE 20 MG/1
20 TABLET, DELAYED RELEASE ORAL
Qty: 30 | Refills: 3 | Status: ACTIVE | COMMUNITY
Start: 2023-12-27

## 2024-02-28 ENCOUNTER — OUTPATIENT (OUTPATIENT)
Dept: OUTPATIENT SERVICES | Facility: HOSPITAL | Age: 64
LOS: 1 days | Discharge: HOME | End: 2024-02-28

## 2024-02-28 DIAGNOSIS — C64.9 MALIGNANT NEOPLASM OF UNSPECIFIED KIDNEY, EXCEPT RENAL PELVIS: ICD-10-CM

## 2024-02-28 DIAGNOSIS — Z95.810 PRESENCE OF AUTOMATIC (IMPLANTABLE) CARDIAC DEFIBRILLATOR: Chronic | ICD-10-CM

## 2024-03-04 LAB
T3 SERPL-MCNC: 73 NG/DL
T4 FREE SERPL-MCNC: 1.7 NG/DL
TSH SERPL-ACNC: 1.02 UIU/ML

## 2024-03-05 NOTE — HISTORY OF PRESENT ILLNESS
[de-identified] : Zac Aldridge is a 62 years old male\par  \par  initially presented to the emergency department at [facility] on October 1 with left flank pain and left lower quadrant  and gross hematuria. \par  \par  10/11/22 A non contrast CT abdomen and pelvis demonstrated an 8 cm left upper pole renal mass, 1 cm para-aortic LN. There were also two parenchymal nodules in the left lower lobe of the lung. CT chest revealed stable 6mm and 9mm left lower lung nodules.   \par  \par  10/31/22 underwent RAL radical nephrectomy, adrenalectomy, and node dissection, ccRCC grade 2, T3a with involvement of perirenal fat, 17 nodes all negative, margins negative and LVI negative.\par  \par  His gross hematuria was resolved after surgery. \par  \par  Now denies left flank pain and LLQ pain, feels overall fine.  \par  \par  12/30/22: today he feels well. appetite and energy unchanged. no rash, joint pain, CP, abn pain, N/V/D. no difficulty or reaction to first treatment. he does have intermittent episodes where he feels that he can't catch his breath. this has been going on for over a year and is not associated with exertion or anxiety. he has no HUBBARD or CP with exertion. he reports that taking 2-3 deep breaths resolves the issue. told him to continue to monitor and let us know if it is worsening \par  \par  1/23/23: C3 keytruda. Patient has been feeling well, denies symptoms or side effects. His TSH low and T4 high on last treatment denies excessive sweating, palpitations, jittery, changes in hair and nails. He stopped taking omeprazole and is now taking pepcid. He has not seen his cardiologist, Dr. Cain, since before his nephrectomy and his is planning to go back soon. Patient denies fever, chills, headache, vision changes, cough, shortness of breath, chest pain, palpitations, abdominal pain, nausea/vomiting, diarrhea, constipation, dysuria, hematuria, itching, rashes, joint pain, mucositis, changes in sensation.\par  \par  2/13/23: C4 keytruda. Has been feeling relatively well. Denies fatigue and has good appetite. He does report that about a week after treatment he noticed a rash on his upper back, chest and forehead. This rash was not itchy, he used Jenniffer cream. \par  \par  3/6/23 C5 keytruda today, feels overall fine. \par   [de-identified] : ccRCC [de-identified] : 3/27/23: C6 keytruda. Last doseon 3/6. He had his nephrectomy on 10/31/22, when he presented to us in November his Cr was between 1.5 and 1.8. On 3/6 his Cr was 2.01, he was brought in for IVF, Cr inc to 2.17 post hydration, FeNa 5.7% c/w post obstructive etiology. Renal ultrasound normal. Seen by renal, biomarkers not consistent with immunotherapy induced nephritis. Reviewed CT chest 3/18 with patient: Stable left lower lobe nodules when compared with prior. Minimal bilateral lower lobe groundglass opacities felt to be related to expiratory phase of scanning. Alternatively this may reflect early changes related to immunotherapy.  6/7/23 MRI abdomen showed ERIC.   8/2/23: patient was receiving adjuvant keytruda of ccRCC,  he completed 5 cycles as of 3/6/23 and then was lost to follow up. Our team had reached out to him and was told by a family member that he was out of the country and he would call us to reschedule when he returned, however he never followed up. We received a refill request for pt's synthroid and discussed that patient needed to be seen.   In March 2023: Last dose on 3/6. He had his nephrectomy on 10/31/22, when he presented to us in November his Cr was between 1.5 and 1.8. On 3/6 his Cr was 2.01, he was brought in for IVF, Cr inc to 2.17 post hydration, FeNa 5.7% c/w post obstructive etiology. Renal ultrasound normal. Seen by renal, biomarkers not consistent with immunotherapy induced nephritis. Reviewed CT chest 3/18 with patient: Stable left lower lobe nodules when compared with prior. Minimal bilateral lower lobe groundglass opacities felt to be related to expiratory phase of scanning.  Patient said he was told by Renal and Urology to stop immunotherapy, he never communicated further with the Oncology team   8/20/23 CT chest showed stable nodules since 10/2022

## 2024-03-05 NOTE — PHYSICAL EXAM
[Fully active, able to carry on all pre-disease performance without restriction] : Status 0 - Fully active, able to carry on all pre-disease performance without restriction [de-identified] : anicteric  [Normal] : affect appropriate [de-identified] : AICD L chest [de-identified] : supple, non tender, FROM  [de-identified] : no edema  [de-identified] : well healed surgical scars

## 2024-03-05 NOTE — ASSESSMENT
[FreeTextEntry1] : 62 year old male initially presented left flank pain and gross hematuria and was found to have an 8cm left renal mass s/p radical nephrectomy, pT3a clear cell RCC, grade 2, negative nodes. CT chest revealed two subcentimeter nodules. He is likely to have stage III (pT3aN0). His two subcentimeter nodules in the LLL will be monitored and currently are too small to be characterized. Given his T3a disease with high risk of recurrence. Keynote 564 demonstrated adjuvant keytruda for 1 year vs placebo significantly prolonged DFS. He started keytruda in December 2022. Patient was receiving adjuvant keytruda of ccRCC, he completed 5 cycles as of 3/6/23 and then was lost to follow up. Our team had reached out to him and was told by a family member that he was out of the country and he would call us to reschedule when he returned, however he never followed up. Imaging in March 2023, CT Chest showed stable LLL nodules and b/l lower lobe ground glass opacities, MR AP showed no evidence of recurrence or metastasis. MR AP June 2023 no evidence of recurrence or metastasis.  Plan:  ccRCC - received 5 cycles of keytruda Dec 2022 - March 2023, will not resume - MR AP June 2023 no evidence of recurrence or metastasis, next imaging in 6 months - CT chest 3/18 with patient: Stable left lower lobe nodules when compared with prior. Minimal bilateral lower lobe groundglass opacities felt to be related to expiratory phase of scanning. Alternatively this may reflect early changes related to immunotherapy. -- June MRI abdomen with ERIC and CT chest with stable bl subcentimeter lung nodules in  August. Next due in Jan whole body images  CKD with improvement - continue to follow with Dr. Covington - avoid nephrotoxins  Hypothyroid - immunotherapy induced - has been on synthroid to 50mcg daily, f/u TFTs today and adjust dose as needed - encouraged pt to follow up with PCP  Instructed to contact our office with any new/worsening symptoms. Patient educated regarding plan of care, all questions/concerns addressed to the best of my abilities and patient's apparent satisfaction. RTC 3 months.

## 2024-03-05 NOTE — REVIEW OF SYSTEMS
[Fever] : no fever [Chills] : no chills [Fatigue] : no fatigue [Dysphagia] : no dysphagia [Mucosal Pain] : no mucosal pain [Chest Pain] : no chest pain [Palpitations] : no palpitations [Lower Ext Edema] : no lower extremity edema [Shortness Of Breath] : no shortness of breath [Cough] : no cough [Abdominal Pain] : no abdominal pain [Constipation] : no constipation [Vomiting] : no vomiting [Dysuria] : no dysuria [Joint Pain] : no joint pain [Muscle Pain] : no muscle pain [Skin Rash] : no skin rash [Dizziness] : no dizziness [Difficulty Walking] : no difficulty walking [Easy Bruising] : no tendency for easy bruising [Easy Bleeding] : no tendency for easy bleeding

## 2024-03-06 ENCOUNTER — APPOINTMENT (OUTPATIENT)
Dept: HEMATOLOGY ONCOLOGY | Facility: CLINIC | Age: 64
End: 2024-03-06

## 2024-04-24 ENCOUNTER — APPOINTMENT (OUTPATIENT)
Dept: HOME HEALTH SERVICES | Facility: HOME HEALTH | Age: 64
End: 2024-04-24
Payer: MEDICARE

## 2024-04-24 VITALS
HEART RATE: 72 BPM | RESPIRATION RATE: 16 BRPM | TEMPERATURE: 98 F | SYSTOLIC BLOOD PRESSURE: 118 MMHG | OXYGEN SATURATION: 97 % | DIASTOLIC BLOOD PRESSURE: 70 MMHG

## 2024-04-24 DIAGNOSIS — I25.10 ATHEROSCLEROTIC HEART DISEASE OF NATIVE CORONARY ARTERY W/OUT ANGINA PECTORIS: ICD-10-CM

## 2024-04-24 DIAGNOSIS — I10 ESSENTIAL (PRIMARY) HYPERTENSION: ICD-10-CM

## 2024-04-24 DIAGNOSIS — N18.30 CHRONIC KIDNEY DISEASE, STAGE 3 UNSPECIFIED: ICD-10-CM

## 2024-04-24 DIAGNOSIS — K21.9 GASTRO-ESOPHAGEAL REFLUX DISEASE W/OUT ESOPHAGITIS: ICD-10-CM

## 2024-04-24 DIAGNOSIS — C64.9 MALIGNANT NEOPLASM OF UNSPECIFIED KIDNEY, EXCEPT RENAL PELVIS: ICD-10-CM

## 2024-04-24 PROCEDURE — 99349 HOME/RES VST EST MOD MDM 40: CPT

## 2024-04-24 RX ORDER — LEVOTHYROXINE SODIUM 0.03 MG/1
25 TABLET ORAL
Qty: 20 | Refills: 0 | Status: DISCONTINUED | COMMUNITY
Start: 2024-01-12 | End: 2024-04-24

## 2024-04-24 NOTE — REASON FOR VISIT
[Follow-Up] : a follow-up visit [Pre-Visit Preparation] : pre-visit preparation was done [Family Member] : family member

## 2024-04-24 NOTE — COUNSELING
[Obese -  ( BMI  >29.9 )] : obese - ( BMI  >29.9 ) [Non - Smoker] : non-smoker [Smoke/CO Detectors] : smoke/CO detectors [Use grab bars] : use grab bars [Use assistive device to avoid falls] : use assistive device to avoid falls [] : foot exam [Improve exercise tolerance] : improve exercise tolerance [Improve mobility] : improve mobility [Improve weight] : improve weight [Improve pain control] : improve pain control [Decrease stress] : decrease stress [Decrease hospital use] : decrease hospital use [Minimize unnecessary interventions] : minimize unnecessary interventions [Comfort Care] : comfort care [Maintain functional ability] : maintain functional ability [Discussed disease trajectory with patient/caregiver] : discussed disease trajectory with patient/caregiver [Likely to achieve goals/desired outcomes] : likely to achieve goals/desired outcomes [Patient/Caregiver has ___ understanding of disease process] : patient/caregiver has [unfilled] understanding of disease process [Advanced Directives discussed: ____] : Advanced directives discussed: [unfilled] [Completed Healthcare Proxy] : completed healthcare proxy [Full Code] : Code Status: Full Code [No Limitations] : Treatment Guidelines: No limitations [Long Term Intubation] : Intubation: Long term intubation [[facility] Date: _____] [facility]last [facility]: [unfilled] [_____] : HCP: [unfilled] [FreeTextEntry3] : low sodium

## 2024-04-24 NOTE — HISTORY OF PRESENT ILLNESS
[[employer]-Management Patient] : [unfilled] is a [employer]s co-management patient [Patient is stable] : patient is stable [Education provided] : education provided [Medications adjusted] : medication adjusted [Patient] : patient [FreeTextEntry2] : PMH: L renal cell carcinoma. S/p radical L nephrectomy. S/p immunotherapy. S/p defibrillator placement AICD. CAD. HTN. CKD st. 3b. GERD. Hypothyroidism. Thyroid antibodies. Lungs nodules. Aortic calcification: Chest CT 09/04/2023.  The patient lives with supportive family in neat apartment. The patient has 5 children and 2 grandchildren. He was enrolled in [employer].  The patient was referred to colonoscopy and gastroscopy to GI Dr. Kelsey Sánchez, [phone].  The patient has an appointment w Endocrinology Dr. Finn Theodore on 01/12/24. He has an appointment w dr Covington on 07/17/24.  Medical Issues:  - L renal cell carcinoma. S/p radical L nephrectomy. S/p immunotherapy. Follow up with nephrologist and urologist every 6 mo. MRI was done. - CAD/AICD: Aspirin 81 mg daily, Entresto 49-51 mg 1 tab BID, Plavix 75 mg daily. Follow up with cardiologist. - HTN: carvedilol 25 mg 1 tab BID. - Hypothyroidism/ Thyroid antibodies: Levothyroxine 100 mcg 1 tab daily. Follow up with endocrinologist. Will monitor. - GERD: Pantoprazole 20 mg 1 tab daily before breakfast. - HLD: Simvastatin 40 mg 1 tab daily. Will monitor. - Vitamin D deficiency: 2000 U 1 cap daily. - CKD st 3b: eGFR 44 on 12/28/2023: avoid nephrotoxic meds, follow up with nephrologist. - Aortic calcification on chest CT on 09/04/23. - Lungs nodules on chest CT on 09/04/23 - unchanged.      Specialists:  PCP Danilo Ramírez [phone] Nephrologist Dr. Nadya Zaragoza [phone]. Urologist Dr. Martin Walton [phone]. Cardiology Dr. Briseida Cooney [phone]. Hemoncologist Dr. Nory Gonzales [phone]. GI Dr. Kelsey Sánchez, [phone]. Endocrinology Dr. Finn Theodore      Social hx:  non-smoker.  Caregivers:  Spouse Yue [phone]  Subjective: -Appetite/weight: appetite is good. Weight is stable. -Gait/falls: Gait stable. No falls. -Pain: Back pain. Occurs with ambulation. Improves with rest.  -Sleep: sleeps wells. -BMs: regular no straining. -Urine: no issue. -Skin: intact -DME: cane. -Mood/memory: mood is good. Short memory is failed. -Communication: conversational, speaks Greenlandic. minimal medical literacy -Health Maintenance:  -Hospitalizations in the past year: none.  MOLST:  Discussion of advance care planning. Total time spent: 35 min. Participants: patient, RN Amie Larson, NP Kelsi Castillo, Discussion: Goals of care, Advanced directives, Health care agency, and Disease trajectory. Completed MOLST form: DNR, DNI, DNH, artificial feeding, determine use of antibiotics, no Dialysis. Please see Advanced Care Planning order. Goal of care: primary focus for patient and family is comfort, patient would like to stay home for the days that remain for pt, also understanding some of the limitations to what we can supply at home in order for pt to meet the need to be comfortable. HCA: Spouse Yue is a Proxy. Disease Trajectory: Discussed and reviewed that patient will not likely functionally improve and will likely become more dependent on ADLs over time. Hospice Benefit also discussed as a potential benefit to patient in future once meeting criteria. Prognosis: not fully discussed at this time with patient and family, will discuss at our next visit a time line of prognosis.

## 2024-05-21 ENCOUNTER — APPOINTMENT (OUTPATIENT)
Dept: UROLOGY | Facility: CLINIC | Age: 64
End: 2024-05-21
Payer: MEDICARE

## 2024-05-21 PROCEDURE — 99214 OFFICE O/P EST MOD 30 MIN: CPT

## 2024-05-21 NOTE — PHYSICAL EXAM
[General Appearance - Well Nourished] : well nourished [Bowel Sounds] : normal bowel sounds [Abdomen Soft] : soft [Skin Turgor] : supple [Heart Rate And Rhythm] : Heart rate and rhythm were normal [Respiration, Rhythm And Depth] : normal respiratory rhythm and effort [Not Anxious] : not anxious [General Appearance - Well Developed] : well developed [Normal Appearance] : normal appearance [] : no respiratory distress [Normal Station and Gait] : the gait and station were normal for the patient's age [Skin Color & Pigmentation] : normal skin color and pigmentation [No Focal Deficits] : no focal deficits [Oriented To Time, Place, And Person] : oriented to person, place, and time [Affect] : the affect was normal [FreeTextEntry1] : incisions well healed

## 2024-05-21 NOTE — HISTORY OF PRESENT ILLNESS
[FreeTextEntry1] : : Aug 27 1960  Referring Provider: none   HPI: Mr. YESI ESPAÑA is a 62 year yo M with a PMHx notable for HTN, CAD on ASA/Plavix. He has history of kidney stones. He presented to the emergency department at [facility] on  with left flank pain and gross hematuria. A non contrast CT demonstrated an 8 cm left upper pole renal mass, 1 cm para-aortic LN. There were also two parenchymal nodules in the left lower lobe of the lung. He was discharged to home. No longer with flank pain, hematuria. He smoked for ~10 years, approx 1 pack per week but quit 30 years ago. He is a former  but now retired.  No headaches / vision changes, no bony pain.   CT chest with two pulmonary nodules 9 mm and 6 mm stable in appearance.   He saw his cardiologist and demonstrated nuclear stress test with EF 51% and no evidence of ischmeia. He was told to stop his plavix 1 week prior to OR.  Anticoagulation: Aspirin / plavix All: NKDA Social: Occasional EtOH. Smoked for ~10 years, 1 pack / week. Quit ~30 years ago. He is on disability due to his medical comorbidities PMHx: CAD s/p PCI , AICD  PSHx: No abdominal surgeries FHx: Father with prostate -  of prostate cancer. He was 88. No family history of kidney cancer Labs:  2023:  Creatinine 1.72 AST: 16; ALT: 15; Calcium: 9  Imaging: CT Urogram and CT chest in PACS with large L upper pole mass with possible adherence to the spleen.  : Here today s/p RAL L Nx, adrenalectomy and RPLND with pathology with pT3a ccRCC G2 N0 (). L testicular discomfort resolved. Saw Dr. Cueto for adjuvant immunotherapy. He was supposed to get 5 cycles. CT chest  had some stable left lower lobe nodules and CT AP due for end of May, ordered. He was noted to also have hypothyroid from the immunotherapy. Will check labs. UA without blood and protein. Also had FRANKLYN from the immunotherapy as well, has been stopped for a while now.   : Here today doing well, pain controlled.  CT chest revealed two subcentimeter nodules. He is likely to have stage III (pT3aN0). His two subcentimeter nodules in the LLL will be monitored and currently are too small to be characterized. Given his T3a disease with high risk of recurrence. Keynote 564 demonstrated adjuvant keytruda for 1 year vs placebo significantly prolonged DFS. He started keytruda in 2022. Patient was receiving adjuvant keytruda of ccRCC, he completed 5 cycles as of 3/6/23 and then was lost to follow up. Imaging in 2023, CT Chest showed stable LLL nodules and b/l lower lobe ground glass opacities, MR AP showed no evidence of recurrence or metastasis. MR AP 2023 no evidence of recurrence or metastasis. No recent PSA. No urinary symptoms. No hematuria.  : Patient presents today for follow up, he is doing well. He denies flank pain, urgency, hematuria and dysuria. He is staying hydrated. No flank pain or hematuria. MR in January was negative for recurrence. Pending repeat imaging of chest in January. Completed adjuvant immunotherpay, No symptoms. Prior Cr reviewed, 1.72 mgdL , penidng repeat labs and UA today. We discussed the fact that PSA is a nonspecific test for cancer although it is prostate specific. We have reviewed the fact that elevations can be caused by multiple separate processes with the prostate including prostate cancer, benign prostate enlargement, prostate inflammation or infection, or combination of any of the above. We also reviewed that procedures involving catheterizations or manipulation of the prostate including colonoscopy could cause PSA to be elevated. I also have reviewed with the patient that there is age specificity related to PSA and that over time there is some expectation that the PSA will slowly rise over time. PSA today.  [Urinary Incontinence] : no urinary incontinence [Urinary Retention] : no urinary retention [Urinary Urgency] : no urinary urgency [Urinary Frequency] : no urinary frequency

## 2024-05-21 NOTE — ASSESSMENT
[FreeTextEntry1] : 64 yo M with pT3a RCC  #ccRCC - MR AP Janurary 2024- Status post left nephrectomy with no evidence of recurrent or metastatic disease in the abdomen. - CT chest 12/30/23-Bilateral indeterminate lung nodules, some which are enlarged and others are stable compared to the prior study. - Cr 1.72 -MRI repeat for Anna  -BMP and UA today   #PSA screening - We discussed the fact that PSA is a nonspecific test for cancer although it is prostate specific. We have reviewed the fact that elevations can be caused by multiple separate processes with the prostate including prostate cancer, benign prostate enlargement, prostate inflammation or infection, or combination of any of the above. We also reviewed that procedures involving catheterizations or manipulation of the prostate including colonoscopy could cause PSA to be elevated. I also have reviewed with the patient that there is age specificity related to PSA and that over time there is some expectation that the PSA will slowly rise over time - PSA today

## 2024-06-04 ENCOUNTER — OUTPATIENT (OUTPATIENT)
Dept: OUTPATIENT SERVICES | Facility: HOSPITAL | Age: 64
LOS: 1 days | End: 2024-06-04
Payer: MEDICARE

## 2024-06-04 ENCOUNTER — APPOINTMENT (OUTPATIENT)
Dept: MRI IMAGING | Facility: HOSPITAL | Age: 64
End: 2024-06-04
Payer: MEDICARE

## 2024-06-04 DIAGNOSIS — N28.89 OTHER SPECIFIED DISORDERS OF KIDNEY AND URETER: ICD-10-CM

## 2024-06-04 DIAGNOSIS — Z95.810 PRESENCE OF AUTOMATIC (IMPLANTABLE) CARDIAC DEFIBRILLATOR: Chronic | ICD-10-CM

## 2024-06-04 DIAGNOSIS — C64.9 MALIGNANT NEOPLASM OF UNSPECIFIED KIDNEY, EXCEPT RENAL PELVIS: ICD-10-CM

## 2024-06-04 PROCEDURE — 71046 X-RAY EXAM CHEST 2 VIEWS: CPT | Mod: 26

## 2024-06-04 PROCEDURE — 74183 MRI ABD W/O CNTR FLWD CNTR: CPT | Mod: 26

## 2024-06-04 PROCEDURE — A9585: CPT

## 2024-06-04 PROCEDURE — 74183 MRI ABD W/O CNTR FLWD CNTR: CPT

## 2024-06-04 PROCEDURE — 71046 X-RAY EXAM CHEST 2 VIEWS: CPT

## 2024-06-15 LAB
ANION GAP SERPL CALC-SCNC: 14 MMOL/L
APPEARANCE: CLEAR
BACTERIA: NEGATIVE /HPF
BILIRUBIN URINE: NEGATIVE
BLOOD URINE: NEGATIVE
BUN SERPL-MCNC: 18 MG/DL
CALCIUM SERPL-MCNC: 10 MG/DL
CAST: 0 /LPF
CHLORIDE SERPL-SCNC: 99 MMOL/L
CO2 SERPL-SCNC: 22 MMOL/L
COLOR: YELLOW
CREAT SERPL-MCNC: 1.59 MG/DL
EGFR: 48 ML/MIN/1.73M2
EPITHELIAL CELLS: 0 /HPF
GLUCOSE QUALITATIVE U: NEGATIVE MG/DL
GLUCOSE SERPL-MCNC: 89 MG/DL
KETONES URINE: NEGATIVE MG/DL
LEUKOCYTE ESTERASE URINE: NEGATIVE
MICROSCOPIC-UA: NORMAL
NITRITE URINE: NEGATIVE
PH URINE: 6.5
POTASSIUM SERPL-SCNC: 5.2 MMOL/L
PROTEIN URINE: NEGATIVE MG/DL
PSA SERPL-MCNC: 0.49 NG/ML
RED BLOOD CELLS URINE: 0 /HPF
SODIUM SERPL-SCNC: 135 MMOL/L
SPECIFIC GRAVITY URINE: 1.02
UROBILINOGEN URINE: 0.2 MG/DL
WHITE BLOOD CELLS URINE: 0 /HPF

## 2024-06-24 ENCOUNTER — APPOINTMENT (OUTPATIENT)
Dept: CT IMAGING | Facility: CLINIC | Age: 64
End: 2024-06-24
Payer: MEDICARE

## 2024-06-24 PROCEDURE — 74178 CT ABD&PLV WO CNTR FLWD CNTR: CPT

## 2024-07-09 ENCOUNTER — APPOINTMENT (OUTPATIENT)
Dept: INTERVENTIONAL RADIOLOGY/VASCULAR | Facility: CLINIC | Age: 64
End: 2024-07-09

## 2024-07-09 VITALS — DIASTOLIC BLOOD PRESSURE: 77 MMHG | HEART RATE: 74 BPM | SYSTOLIC BLOOD PRESSURE: 130 MMHG | OXYGEN SATURATION: 98 %

## 2024-07-09 VITALS — WEIGHT: 190 LBS | HEIGHT: 67 IN | BODY MASS INDEX: 29.82 KG/M2 | RESPIRATION RATE: 17 BRPM

## 2024-07-09 DIAGNOSIS — R19.00 INTRA-ABDOMINAL AND PELVIC SWELLING, MASS AND LUMP, UNSPECIFIED SITE: ICD-10-CM

## 2024-07-09 DIAGNOSIS — K66.9 DISORDER OF PERITONEUM, UNSPECIFIED: ICD-10-CM

## 2024-07-09 PROCEDURE — XXXXX: CPT | Mod: 1L

## 2024-07-11 ENCOUNTER — APPOINTMENT (OUTPATIENT)
Dept: NEPHROLOGY | Facility: CLINIC | Age: 64
End: 2024-07-11
Payer: MEDICARE

## 2024-07-11 VITALS
BODY MASS INDEX: 30.45 KG/M2 | HEIGHT: 67 IN | TEMPERATURE: 98 F | DIASTOLIC BLOOD PRESSURE: 82 MMHG | HEART RATE: 65 BPM | OXYGEN SATURATION: 96 % | SYSTOLIC BLOOD PRESSURE: 130 MMHG | WEIGHT: 194 LBS

## 2024-07-11 DIAGNOSIS — C64.9 MALIGNANT NEOPLASM OF UNSPECIFIED KIDNEY, EXCEPT RENAL PELVIS: ICD-10-CM

## 2024-07-11 DIAGNOSIS — N18.30 CHRONIC KIDNEY DISEASE, STAGE 3 UNSPECIFIED: ICD-10-CM

## 2024-07-11 DIAGNOSIS — Z90.5 ACQUIRED ABSENCE OF KIDNEY: ICD-10-CM

## 2024-07-11 DIAGNOSIS — I10 ESSENTIAL (PRIMARY) HYPERTENSION: ICD-10-CM

## 2024-07-11 PROCEDURE — G2211 COMPLEX E/M VISIT ADD ON: CPT

## 2024-07-11 PROCEDURE — 99213 OFFICE O/P EST LOW 20 MIN: CPT

## 2024-07-17 ENCOUNTER — OUTPATIENT (OUTPATIENT)
Dept: OUTPATIENT SERVICES | Facility: HOSPITAL | Age: 64
LOS: 1 days | End: 2024-07-17

## 2024-07-17 VITALS
OXYGEN SATURATION: 98 % | RESPIRATION RATE: 16 BRPM | SYSTOLIC BLOOD PRESSURE: 146 MMHG | HEART RATE: 65 BPM | WEIGHT: 195.11 LBS | DIASTOLIC BLOOD PRESSURE: 78 MMHG | HEIGHT: 67 IN

## 2024-07-17 DIAGNOSIS — I50.9 HEART FAILURE, UNSPECIFIED: ICD-10-CM

## 2024-07-17 DIAGNOSIS — I25.10 ATHEROSCLEROTIC HEART DISEASE OF NATIVE CORONARY ARTERY WITHOUT ANGINA PECTORIS: ICD-10-CM

## 2024-07-17 DIAGNOSIS — Z90.5 ACQUIRED ABSENCE OF KIDNEY: Chronic | ICD-10-CM

## 2024-07-17 DIAGNOSIS — K66.8 OTHER SPECIFIED DISORDERS OF PERITONEUM: ICD-10-CM

## 2024-07-17 DIAGNOSIS — C64.9 MALIGNANT NEOPLASM OF UNSPECIFIED KIDNEY, EXCEPT RENAL PELVIS: ICD-10-CM

## 2024-07-17 DIAGNOSIS — Z95.810 PRESENCE OF AUTOMATIC (IMPLANTABLE) CARDIAC DEFIBRILLATOR: Chronic | ICD-10-CM

## 2024-07-17 LAB
HCT VFR BLD CALC: 43 % — SIGNIFICANT CHANGE UP (ref 39–50)
HGB BLD-MCNC: 14.2 G/DL — SIGNIFICANT CHANGE UP (ref 13–17)
MCHC RBC-ENTMCNC: 28.2 PG — SIGNIFICANT CHANGE UP (ref 27–34)
MCHC RBC-ENTMCNC: 33 GM/DL — SIGNIFICANT CHANGE UP (ref 32–36)
MCV RBC AUTO: 85.5 FL — SIGNIFICANT CHANGE UP (ref 80–100)
NRBC # BLD: 0 /100 WBCS — SIGNIFICANT CHANGE UP (ref 0–0)
NRBC # FLD: 0 K/UL — SIGNIFICANT CHANGE UP (ref 0–0)
PLATELET # BLD AUTO: 187 K/UL — SIGNIFICANT CHANGE UP (ref 150–400)
RBC # BLD: 5.03 M/UL — SIGNIFICANT CHANGE UP (ref 4.2–5.8)
RBC # FLD: 13.5 % — SIGNIFICANT CHANGE UP (ref 10.3–14.5)
WBC # BLD: 6.32 K/UL — SIGNIFICANT CHANGE UP (ref 3.8–10.5)
WBC # FLD AUTO: 6.32 K/UL — SIGNIFICANT CHANGE UP (ref 3.8–10.5)

## 2024-07-17 RX ORDER — ASPIRIN 325 MG/1
1 TABLET, FILM COATED ORAL
Refills: 0 | DISCHARGE

## 2024-07-17 NOTE — H&P PST ADULT - HISTORY OF PRESENT ILLNESS
58 y/o M with hx of HTN, CAD s/p proximal LCx, Distal LCx,  OM2, ramus intermedius, and diagonal stents, HLD presents with dizziness x 2-3 days. Patient states dizziness is intermittent, and random onset. He cannot recall what exacerbates or relieves the dizziness. He also noticed that he has SOB with exertion with 1 flight of stairs or walking up to 1 block x 2-3 weeks, which is new for him. He also complains of b/l LE and UE weakness with the dizziness. He saw his PMD for these symptoms and had normal blood work. He planned to see his cardiologist on Monday, but the dizziness was worsening so he came to ED for further eval. Denies fever, chills, cough, falls, LOC, chest pain, melena, hematochezia, abdominal pain, diarrhea, constipation, LE edema, calf tenderness or dysuria. Pt is a 63 yr old       58 y/o M with hx of HTN, CAD s/p proximal LCx, Distal LCx,  OM2, ramus intermedius, and diagonal stents, HLD presents with dizziness x 2-3 days. Patient states dizziness is intermittent, and random onset. He cannot recall what exacerbates or relieves the dizziness. He also noticed that he has SOB with exertion with 1 flight of stairs or walking up to 1 block x 2-3 weeks, which is new for him. He also complains of b/l LE and UE weakness with the dizziness. He saw his PMD for these symptoms and had normal blood work. He planned to see his cardiologist on Monday, but the dizziness was worsening so he came to ED for further eval. Denies fever, chills, cough, falls, LOC, chest pain, melena, hematochezia, abdominal pain, diarrhea, constipation, LE edema, calf tenderness or dysuria.  Pt is a 63 yr old male scheduled for Guided Peritoneal Mass Bx with Dr Weldon 7/23/24 - pt hx HTN, CAD / ASA 81 mg  , chronic  systolic CHF with AICD placed 2018,  ischemic cardiomyopathy , renal cell ca with left radical nephrectomy 10/22 ( completed immunotherapy) who was found to have new peritoneal cyst with screening CT  scan - pt had c/o of LLQ times but denies today - pt denies hematuria or flank pain - Hx AICD placed 2018 with last interrogation 4/24 - seen by Cardio 6/26/24 - pt hx CAD with stents x4 , last placed 2013 - stopped Plavix 1 month ago - pt denies wt gain or edema

## 2024-07-17 NOTE — H&P PST ADULT - BLOOD TRANSFUSION, PREVIOUS, PROFILE
Render Risk Assessment In Note?: no Additional Notes: If not improved with topical steroids will biopsy. Detail Level: Simple no

## 2024-07-17 NOTE — H&P PST ADULT - NS MD HP INPLANTS MED DEV
Automatic Implantable Cardioverter Defibrillator Medtronic BiV AICD DTMA1 QQ #[id number]/Automatic Implantable Cardioverter Defibrillator

## 2024-07-17 NOTE — H&P PST ADULT - CARDIOVASCULAR COMMENTS
AICD placed left upper chest Hx AICD placed 2018 with last interrogation 4/24 - pt denies CP, SOB or edema - hx CAD 4 stents placed 2013 - pt now off Plavix but remains on ASA 81 mg po - surgeon desires pt off 5 days but Cardio has not confirmed - will request from Cardio

## 2024-07-17 NOTE — H&P PST ADULT - PROBLEM SELECTOR PLAN 1
Pt scheduled for surgery and preop instructions including instructions for taking Famotidine  the day of surgery, given verbally and with use of  written materials, and patient confirming understanding of such instructions using  teach back method.  Cardio note faxed and in chart - does not confirm if pt can stop ASA preop 5 days as requested by surgeon - surgeon office closed - email to be sent and f/u in am   Request new Echo report done 7/11/24 Pt scheduled for surgery and preop instructions including instructions for taking Famotidine  the day of surgery, given verbally and with use of  written materials, and patient confirming understanding of such instructions using  teach back method.  Cardio note faxed and in chart - does not confirm if pt can stop ASA preop 5 days as requested by surgeon - surgeon office closed - email to be sent and f/u in am   Request new Echo report done 7/11/24  CMP printed as part of BW done 6/28/24 - CBC done in PST as requested by surgeon Pt scheduled for surgery and preop instructions including instructions for taking Famotidine  the day of surgery, given verbally and with use of  written materials, and patient confirming understanding of such instructions using  teach back method.  Call to Cardio and confirmed with NP that pt can stop ASA 5 days preop - pt called and told to stop as of today   Request new Echo report done 7/11/24  CMP printed as part of BW done 6/28/24 - CBC done in PST as requested by surgeon

## 2024-07-17 NOTE — H&P PST ADULT - NSICDXPASTMEDICALHX_GEN_ALL_CORE_FT
PAST MEDICAL HISTORY:  CAD (Coronary Artery Disease)     Coronary Stent to LAD and D2 in 1/2011  proximal LCx, Distal LCx,  OM2, ramus intermedius, and diagonal stents    HTN (Hypertension)     Hyperlipidemia      PAST MEDICAL HISTORY:  CAD (Coronary Artery Disease)     Coronary Stent to LAD and D2 in 1/2011  proximal LCx, Distal LCx,  OM2, ramus intermedius, and diagonal stents    H/O CHF     HTN (Hypertension)     Hyperlipidemia     Peritoneal cyst     Presence of biventricular AICD     Renal cell cancer

## 2024-07-17 NOTE — H&P PST ADULT - LAST ECHOCARDIOGRAM
appt for echo on 7/11/24 - Echo done 5/23 - EF 47% LV normal in size and function, normal pulmonary artery

## 2024-07-17 NOTE — H&P PST ADULT - NEGATIVE NEUROLOGICAL SYMPTOMS
History of Present Illness


General


Chief Complaint:  Substance Abuse


Source:  Patient





Present Illness


HPI


This is a 25-year-old male with no past medical history.  He has a history of 

heroin abuse.  He presents with an overdose on heroin.  He said his been clean 

for 8 months and started using heroin again tonight.  He said he only uses a 

very small amount.  He became unresponsive and friends called 911.  He was 

given Narcan and responded.  Not suicidal homicidal.  Denies any other 

complaint.  He said he use a very tiny amount.  He suspect that there may be 

something else in the heroin.  Denies any other complaint.


Allergies:  


Coded Allergies:  


     No Known Allergies (Unverified , 12/22/18)





Patient History


Past Medical History:  see triage record, old chart reviewed


Past Surgical History:  none


Pertinent Family History:  none


Social History:  Reports: drug use


Immunizations:  other


Reviewed Nursing Documentation:  PMH: Agreed; PSxH: Agreed





Nursing Documentation-PM


Past Medical History:  No Stated History





Review of Systems


Eye:  Denies: eye pain, blurred vision


ENT:  Denies: ear pain, nose congestion, throat swelling


Respiratory:  Denies: cough, shortness of breath


Cardiovascular:  Denies: chest pain, palpitations


Gastrointestinal:  Denies: abdominal pain, diarrhea, nausea, vomiting


Musculoskeletal:  Denies: back pain, joint pain


Skin:  Denies: rash


Neurological:  Denies: headache, numbness


Endocrine:  Denies: increased thirst, increased urine


Hematologic/Lymphatic:  Denies: easy bruising


All Other Systems:  negative except mentioned in HPI





Physical Exam





Vital Signs








  Date Time  Temp Pulse Resp B/P (MAP) Pulse Ox O2 Delivery O2 Flow Rate FiO2


 


12/22/18 20:38  118 18 121/63 98 Room Air  





vitals normal


Sp02 EP Interpretation:  reviewed, normal


General Appearance:  well appearing, no apparent distress, alert


Head:  normocephalic, atraumatic


Eyes:  bilateral eye PERRL, bilateral eye EOMI


ENT:  hearing grossly normal, normal pharynx


Neck:  full range of motion, supple, no meningismus


Respiratory:  chest non-tender, lungs clear, normal breath sounds


Cardiovascular #1:  regular rate, rhythm, no murmur


Gastrointestinal:  normal bowel sounds, non tender, no mass, no organomegaly, 

no bruit, non-distended


Musculoskeletal:  back normal, gait/station normal, normal range of motion


Psychiatric:  mood/affect normal


Skin:  warm/dry





Medical Decision Making


Diagnostic Impression:  


 Primary Impression:  


 Heroin overdose


 Qualified Codes:  T40.1X1A - Poisoning by heroin, accidental (unintentional), 

initial encounter


ER Course


Patient with a heroin overdose.  This may have been laced with fentanyl since 

he said he only uses a very small amount.  He is not suicidal or homicidal.  No 

criteria for 5150.  We'll refer to rehabilitation.





Last Vital Signs








  Date Time  Temp Pulse Resp B/P (MAP) Pulse Ox O2 Delivery O2 Flow Rate FiO2


 


12/22/18 20:38  118 18 121/63 98 Room Air  








Status:  improved


Disposition:  HOME, SELF-CARE


Condition:  Stable


Scripts


No Active Prescriptions or Reported Meds





Additional Instructions:  


Abstain from drugs and alcohol.  Go to rehabilitation.  Follow-up with your 

doctor in 7 days.  Return if worse.











Aniceto Gomez MD Dec 22, 2018 21:03
no transient paralysis/no generalized seizures

## 2024-07-23 ENCOUNTER — RESULT REVIEW (OUTPATIENT)
Age: 64
End: 2024-07-23

## 2024-07-31 PROBLEM — Z86.79 PERSONAL HISTORY OF OTHER DISEASES OF THE CIRCULATORY SYSTEM: Chronic | Status: ACTIVE | Noted: 2024-07-17

## 2024-07-31 PROBLEM — K66.8 OTHER SPECIFIED DISORDERS OF PERITONEUM: Chronic | Status: ACTIVE | Noted: 2024-07-17

## 2024-07-31 PROBLEM — C64.9 MALIGNANT NEOPLASM OF UNSPECIFIED KIDNEY, EXCEPT RENAL PELVIS: Chronic | Status: ACTIVE | Noted: 2024-07-17

## 2024-07-31 PROBLEM — Z95.810 PRESENCE OF AUTOMATIC (IMPLANTABLE) CARDIAC DEFIBRILLATOR: Chronic | Status: ACTIVE | Noted: 2024-07-17

## 2024-08-06 ENCOUNTER — OUTPATIENT (OUTPATIENT)
Dept: OUTPATIENT SERVICES | Facility: HOSPITAL | Age: 64
LOS: 1 days | End: 2024-08-06

## 2024-08-06 VITALS
HEART RATE: 62 BPM | RESPIRATION RATE: 16 BRPM | DIASTOLIC BLOOD PRESSURE: 78 MMHG | WEIGHT: 190.04 LBS | HEIGHT: 67 IN | OXYGEN SATURATION: 98 % | SYSTOLIC BLOOD PRESSURE: 126 MMHG | TEMPERATURE: 97 F

## 2024-08-06 DIAGNOSIS — E03.9 HYPOTHYROIDISM, UNSPECIFIED: ICD-10-CM

## 2024-08-06 DIAGNOSIS — Z90.5 ACQUIRED ABSENCE OF KIDNEY: Chronic | ICD-10-CM

## 2024-08-06 DIAGNOSIS — Z95.810 PRESENCE OF AUTOMATIC (IMPLANTABLE) CARDIAC DEFIBRILLATOR: ICD-10-CM

## 2024-08-06 DIAGNOSIS — R19.00 INTRA-ABDOMINAL AND PELVIC SWELLING, MASS AND LUMP, UNSPECIFIED SITE: ICD-10-CM

## 2024-08-06 DIAGNOSIS — Z95.810 PRESENCE OF AUTOMATIC (IMPLANTABLE) CARDIAC DEFIBRILLATOR: Chronic | ICD-10-CM

## 2024-08-06 DIAGNOSIS — Z91.89 OTHER SPECIFIED PERSONAL RISK FACTORS, NOT ELSEWHERE CLASSIFIED: ICD-10-CM

## 2024-08-06 DIAGNOSIS — I25.5 ISCHEMIC CARDIOMYOPATHY: ICD-10-CM

## 2024-08-06 DIAGNOSIS — N18.30 CHRONIC KIDNEY DISEASE, STAGE 3 UNSPECIFIED: ICD-10-CM

## 2024-08-06 DIAGNOSIS — Z95.5 PRESENCE OF CORONARY ANGIOPLASTY IMPLANT AND GRAFT: ICD-10-CM

## 2024-08-06 LAB
ANION GAP SERPL CALC-SCNC: 15 MMOL/L — HIGH (ref 7–14)
BASOPHILS # BLD AUTO: 0.03 K/UL — SIGNIFICANT CHANGE UP (ref 0–0.2)
BASOPHILS NFR BLD AUTO: 0.6 % — SIGNIFICANT CHANGE UP (ref 0–2)
BLD GP AB SCN SERPL QL: NEGATIVE — SIGNIFICANT CHANGE UP
BUN SERPL-MCNC: 13 MG/DL — SIGNIFICANT CHANGE UP (ref 7–23)
CALCIUM SERPL-MCNC: 8 MG/DL — LOW (ref 8.4–10.5)
CHLORIDE SERPL-SCNC: 102 MMOL/L — SIGNIFICANT CHANGE UP (ref 98–107)
CO2 SERPL-SCNC: 21 MMOL/L — LOW (ref 22–31)
CREAT SERPL-MCNC: 1.53 MG/DL — HIGH (ref 0.5–1.3)
EGFR: 51 ML/MIN/1.73M2 — LOW
EOSINOPHIL # BLD AUTO: 0.18 K/UL — SIGNIFICANT CHANGE UP (ref 0–0.5)
EOSINOPHIL NFR BLD AUTO: 3.5 % — SIGNIFICANT CHANGE UP (ref 0–6)
GLUCOSE SERPL-MCNC: 96 MG/DL — SIGNIFICANT CHANGE UP (ref 70–99)
HCT VFR BLD CALC: 40.5 % — SIGNIFICANT CHANGE UP (ref 39–50)
HGB BLD-MCNC: 13.6 G/DL — SIGNIFICANT CHANGE UP (ref 13–17)
IMM GRANULOCYTES NFR BLD AUTO: 0.6 % — SIGNIFICANT CHANGE UP (ref 0–0.9)
LYMPHOCYTES # BLD AUTO: 1.17 K/UL — SIGNIFICANT CHANGE UP (ref 1–3.3)
LYMPHOCYTES # BLD AUTO: 22.5 % — SIGNIFICANT CHANGE UP (ref 13–44)
MCHC RBC-ENTMCNC: 28.3 PG — SIGNIFICANT CHANGE UP (ref 27–34)
MCHC RBC-ENTMCNC: 33.6 GM/DL — SIGNIFICANT CHANGE UP (ref 32–36)
MCV RBC AUTO: 84.2 FL — SIGNIFICANT CHANGE UP (ref 80–100)
MONOCYTES # BLD AUTO: 0.43 K/UL — SIGNIFICANT CHANGE UP (ref 0–0.9)
MONOCYTES NFR BLD AUTO: 8.3 % — SIGNIFICANT CHANGE UP (ref 2–14)
NEUTROPHILS # BLD AUTO: 3.36 K/UL — SIGNIFICANT CHANGE UP (ref 1.8–7.4)
NEUTROPHILS NFR BLD AUTO: 64.5 % — SIGNIFICANT CHANGE UP (ref 43–77)
PLATELET # BLD AUTO: 163 K/UL — SIGNIFICANT CHANGE UP (ref 150–400)
POTASSIUM SERPL-MCNC: 4.1 MMOL/L — SIGNIFICANT CHANGE UP (ref 3.5–5.3)
POTASSIUM SERPL-SCNC: 4.1 MMOL/L — SIGNIFICANT CHANGE UP (ref 3.5–5.3)
RBC # BLD: 4.81 M/UL — SIGNIFICANT CHANGE UP (ref 4.2–5.8)
RBC # FLD: 13.9 % — SIGNIFICANT CHANGE UP (ref 10.3–14.5)
RH IG SCN BLD-IMP: NEGATIVE — SIGNIFICANT CHANGE UP
RH IG SCN BLD-IMP: NEGATIVE — SIGNIFICANT CHANGE UP
SODIUM SERPL-SCNC: 138 MMOL/L — SIGNIFICANT CHANGE UP (ref 135–145)
WBC # BLD: 5.2 K/UL — SIGNIFICANT CHANGE UP (ref 3.8–10.5)
WBC # FLD AUTO: 5.2 K/UL — SIGNIFICANT CHANGE UP (ref 3.8–10.5)

## 2024-08-06 RX ORDER — LEVOTHYROXINE SODIUM 25 MCG
1 TABLET ORAL
Refills: 0 | DISCHARGE

## 2024-08-06 RX ORDER — SIMVASTATIN 40 MG
1 TABLET ORAL
Refills: 0 | DISCHARGE

## 2024-08-06 RX ORDER — PANTOPRAZOLE SODIUM 20 MG/1
1 TABLET, DELAYED RELEASE ORAL
Refills: 0 | DISCHARGE

## 2024-08-06 RX ORDER — ASPIRIN 325 MG/1
1 TABLET, FILM COATED ORAL
Qty: 0 | Refills: 0 | DISCHARGE

## 2024-08-06 NOTE — H&P PST ADULT - PROBLEM SELECTOR PLAN 3
Pending recent echo report from cardiology (6/2024).    Patient instructed to take carvedilol and Entresto with a sip of water on the morning of procedure. Pending recent echo report from cardiology (7/2024).    Patient instructed to take carvedilol and Entresto with a sip of water on the morning of procedure.    Last cardiology note in chart from 6/2024 - "pt is normotensive, with no evidence of heart failure on examination" Patient instructed to take carvedilol and Entresto with a sip of water on the morning of procedure.    Last cardiology note in chart from 6/2024 - "pt is normotensive, with no evidence of heart failure on examination"    Pending recent echo report from cardiology (7/2024).

## 2024-08-06 NOTE — H&P PST ADULT - PRIMARY CARE PROVIDER
Dr Sparks [phone]        Dr Covington nephrologist [phone] Dr Sparks [phone]                                                                                                                                                                   Dr Covington nephrologist [phone]

## 2024-08-06 NOTE — H&P PST ADULT - PROBLEM SELECTOR PLAN 2
Pt does not have a card.    Copy of interrogation report in chart - 6/30/24.    OR booking notified. Pt does not have a card.    Copy of interrogation report in chart - 6/30/24.    OR booking notified, EP team notified via email.

## 2024-08-06 NOTE — H&P PST ADULT - PROBLEM SELECTOR PLAN 7
last stent 2014, As per surgeon, holding ASA, cardiologist approved as per patient, holding since last week.

## 2024-08-06 NOTE — H&P PST ADULT - NSICDXPASTSURGICALHX_GEN_ALL_CORE_FT
PAST SURGICAL HISTORY:  H/O radical nephrectomy     Presence of biventricular AICD     Stented coronary artery x4

## 2024-08-06 NOTE — H&P PST ADULT - LAST ECHOCARDIOGRAM
last echo 6/2024 - Echo done 5/2023 - EF 47% LV normal in size and function, normal pulmonary artery last echo 7/2024 - Echo done 5/2023 - EF 47% LV normal in size and function, normal pulmonary artery

## 2024-08-06 NOTE — H&P PST ADULT - HISTORY OF PRESENT ILLNESS
62 y/o male PMH HTN, CAD, Cardiac stents (2011, 2013, 2014), chronic systolic CHF with AICD placed 2018, ischemic cardiomyopathy, renal cell carcinoma s/p Robotic-assisted laparoscopic left-sided radical nephrectomy, left-sided adrenalectomy, retroperitoneal lymphadenectomy in Oct. 2022, (completed immunotherapy, started in 2022), who was found to have new peritoneal cyst on screening CT scan, s/p CT guided biopsy of peritoneal mass on 7/23/24. Pt with intermittent left LLQ discomfort. Pt is scheduled for a robotic assisted laparoscopic single post excision of peritoneal mass.

## 2024-08-06 NOTE — H&P PST ADULT - PROBLEM SELECTOR PLAN 1
Patient tentatively scheduled for robotic assisted laparoscopic single post excision of peritoneal mass for 8/8/24. Pre-op instructions provided. Pt given verbal and written instructions with teach back on chlorhexidine shampoo and pepcid. Pt verbalized understanding with return demonstration.       CBC BMP T&S/ABO done

## 2024-08-06 NOTE — H&P PST ADULT - OTHER CARE PROVIDERS
Cardio Dr Cooney [phone] , Dr Earlene HALE ( same #) Cardio Dr Cooney [phone] , Dr Earlene HALE (same #)

## 2024-08-06 NOTE — H&P PST ADULT - NSICDXPASTMEDICALHX_GEN_ALL_CORE_FT
PAST MEDICAL HISTORY:  CAD (Coronary Artery Disease)     Coronary Stent to LAD and D2 in 1/2011  proximal LCx, Distal LCx,  OM2, ramus intermedius, and diagonal stents    H/O CHF     HTN (Hypertension)     Hyperlipidemia     Hypothyroidism     Ischemic cardiomyopathy     Peritoneal cyst     Presence of biventricular AICD     Renal cell cancer     Stage 3 chronic kidney disease

## 2024-08-06 NOTE — H&P PST ADULT - CARDIOVASCULAR COMMENTS
Hx AICD placed 2018, pt denies CP, SOB or edema, denies device discharge - hx CAD 4 stents, last placed 2014  on ASA AICD left upper chest

## 2024-08-06 NOTE — H&P PST ADULT - NS MD HP INPLANTS MED DEV
Desirae Mc MRI Quad CRTD [id number], Serial number - [id number],/Automatic Implantable Cardioverter Defibrillator

## 2024-08-07 NOTE — ASU PATIENT PROFILE, ADULT - INTERNATIONAL TRAVEL
1)Educated that in my medical opinion as child on amoxicillin and stooling more and pain prior to stooling and then resolved then this most likely related to bowel movement  2)Offered AXR, family would like to monitor  3)Educated about reasons to go to the er/see provider earlier  4)Follow-up with Dr. Mera in 1-2weeks for follow-up or earlier if needed  
No

## 2024-08-08 ENCOUNTER — RESULT REVIEW (OUTPATIENT)
Age: 64
End: 2024-08-08

## 2024-08-08 ENCOUNTER — OUTPATIENT (OUTPATIENT)
Dept: OUTPATIENT SERVICES | Facility: HOSPITAL | Age: 64
LOS: 1 days | Discharge: HOME | End: 2024-08-08
Payer: MEDICARE

## 2024-08-08 ENCOUNTER — APPOINTMENT (OUTPATIENT)
Dept: UROLOGY | Facility: HOSPITAL | Age: 64
End: 2024-08-08

## 2024-08-08 VITALS
SYSTOLIC BLOOD PRESSURE: 132 MMHG | TEMPERATURE: 98 F | DIASTOLIC BLOOD PRESSURE: 79 MMHG | RESPIRATION RATE: 17 BRPM | OXYGEN SATURATION: 95 % | HEART RATE: 60 BPM

## 2024-08-08 VITALS
OXYGEN SATURATION: 100 % | DIASTOLIC BLOOD PRESSURE: 74 MMHG | SYSTOLIC BLOOD PRESSURE: 142 MMHG | TEMPERATURE: 98 F | HEIGHT: 67 IN | RESPIRATION RATE: 16 BRPM | HEART RATE: 63 BPM | WEIGHT: 190.04 LBS

## 2024-08-08 DIAGNOSIS — R19.00 INTRA-ABDOMINAL AND PELVIC SWELLING, MASS AND LUMP, UNSPECIFIED SITE: ICD-10-CM

## 2024-08-08 DIAGNOSIS — Z95.810 PRESENCE OF AUTOMATIC (IMPLANTABLE) CARDIAC DEFIBRILLATOR: Chronic | ICD-10-CM

## 2024-08-08 DIAGNOSIS — Z90.5 ACQUIRED ABSENCE OF KIDNEY: Chronic | ICD-10-CM

## 2024-08-08 PROCEDURE — 49329 UNLSTD LAPS PX ABD PERTM&OMN: CPT | Mod: AS

## 2024-08-08 PROCEDURE — 88331 PATH CONSLTJ SURG 1 BLK 1SPC: CPT | Mod: 26

## 2024-08-08 PROCEDURE — 88305 TISSUE EXAM BY PATHOLOGIST: CPT | Mod: 26

## 2024-08-08 PROCEDURE — 49329 UNLSTD LAPS PX ABD PERTM&OMN: CPT

## 2024-08-08 RX ORDER — ONDANSETRON HCL/PF 4 MG/2 ML
4 VIAL (ML) INJECTION ONCE
Refills: 0 | Status: ACTIVE | OUTPATIENT
Start: 2024-08-08 | End: 2025-07-07

## 2024-08-08 RX ORDER — LEVOTHYROXINE SODIUM 175 MCG
1 TABLET ORAL
Refills: 0 | DISCHARGE

## 2024-08-08 RX ORDER — BACTERIOSTATIC SODIUM CHLORIDE 0.9 %
1000 VIAL (ML) INJECTION
Refills: 0 | Status: ACTIVE | OUTPATIENT
Start: 2024-08-08 | End: 2025-07-07

## 2024-08-08 RX ORDER — ASPIRIN 500 MG
1 TABLET ORAL
Refills: 0 | DISCHARGE

## 2024-08-08 RX ORDER — PANTOPRAZOLE SODIUM 20 MG/1
1 TABLET, DELAYED RELEASE ORAL
Refills: 0 | DISCHARGE

## 2024-08-08 RX ORDER — HYDROMORPHONE HCL IN 0.9% NACL 0.2 MG/ML
0.5 PLASTIC BAG, INJECTION (ML) INTRAVENOUS
Refills: 0 | Status: DISCONTINUED | OUTPATIENT
Start: 2024-08-08 | End: 2024-08-08

## 2024-08-08 RX ADMIN — Medication 0.5 MILLIGRAM(S): at 17:00

## 2024-08-08 RX ADMIN — Medication 0.5 MILLIGRAM(S): at 16:16

## 2024-08-08 RX ADMIN — Medication 0.5 MILLIGRAM(S): at 16:30

## 2024-08-08 RX ADMIN — Medication 0.5 MILLIGRAM(S): at 16:36

## 2024-08-08 NOTE — BRIEF OPERATIVE NOTE - FIRST ASSIST PARTICIPATION DETAILS - (COMPONENTS OF THE PROCEDURE THAT ASSISTANT PARTICIPATED IN)
I, Chance Dale PA-C, served as the first assistant in this operation. I assisted in placing ports, docking, and targeting the da Daniel robot, first assisted at the surgical field while the surgeon was performing the operation at the robotic console by providing instrument exchanges, tissue retraction, suction and irrigation, specimen retrieval, passing and removing sutures and sponges, undocking the robotic platform, and closed surgical wounds.

## 2024-08-08 NOTE — ASU DISCHARGE PLAN (ADULT/PEDIATRIC) - FOLLOW UP APPOINTMENTS
[facility] After 8 pm PACU /[facility]

## 2024-08-08 NOTE — ASU PREOP CHECKLIST - 2.
Pt states his HCP is Sister at bedside, Suzie Chisholm, He gave us verbal permission to give information to spouse Yue as well

## 2024-08-08 NOTE — ASU DISCHARGE PLAN (ADULT/PEDIATRIC) - NS MD DC FALL RISK RISK
For information on Fall & Injury Prevention, visit: [email or web address] OR  [email or web address] OR  https://www.cdc.gov/steadi/patient.html

## 2024-08-08 NOTE — ASU DISCHARGE PLAN (ADULT/PEDIATRIC) - NURSING INSTRUCTIONS
DO NOT take any Tylenol (Acetaminophen) or narcotics containing Tylenol until after 9:15pm,. You received Tylenol during your operation and it can cause damage to your liver if too much is taken within a 24 hour time period.

## 2024-08-08 NOTE — ASU DISCHARGE PLAN (ADULT/PEDIATRIC) - ASU DC SPECIAL INSTRUCTIONSFT
PAIN CONTROL: You may take 650 mg of Tylenol every 4-6 hours. Do not exceed 4 grams of Tylenol daily.   WOUND CARE: You have skin glue over the incision that will peal off on its own.   ANTIBIOTICS: No antibiotics  BATHING: Please do not submerge wound underwater. You may shower after 48 hours and/or sponge bathe.  ACTIVITY: No heavy lifting or straining. Otherwise, you may return to your usual level of physical activity.   DIET: Return to your usual diet.  NOTIFY YOUR SURGEON IF: You have any bleeding that does not stop, any pus draining from your wound, any fever (over 100.4 F) or chills, persistent nausea/vomiting, persistent diarrhea, or if your pain is not controlled on your discharge pain medications.  FOLLOW-UP:  1. Please call your surgeon to make a follow-up appointment within 1-2 weeks of discharge  2. Please follow up with your primary care physician in 1-2 weeks regarding your hospitalization

## 2024-08-08 NOTE — ASU PREOP CHECKLIST - 3.
Pt declined the use of an  for primary language Panamanian, but states "I understand English fine, but I would prefer to read in Panamanian for my consents"

## 2024-08-14 LAB — SURGICAL PATHOLOGY STUDY: SIGNIFICANT CHANGE UP

## 2024-08-16 PROBLEM — N18.30 CHRONIC KIDNEY DISEASE, STAGE 3 UNSPECIFIED: Chronic | Status: ACTIVE | Noted: 2024-08-06

## 2024-08-16 PROBLEM — I25.5 ISCHEMIC CARDIOMYOPATHY: Chronic | Status: ACTIVE | Noted: 2024-08-06

## 2024-08-16 PROBLEM — E03.9 HYPOTHYROIDISM, UNSPECIFIED: Chronic | Status: ACTIVE | Noted: 2024-08-06

## 2024-08-27 ENCOUNTER — APPOINTMENT (OUTPATIENT)
Dept: UROLOGY | Facility: CLINIC | Age: 64
End: 2024-08-27
Payer: MEDICARE

## 2024-08-27 VITALS
SYSTOLIC BLOOD PRESSURE: 125 MMHG | RESPIRATION RATE: 16 BRPM | DIASTOLIC BLOOD PRESSURE: 77 MMHG | HEART RATE: 65 BPM | OXYGEN SATURATION: 99 % | BODY MASS INDEX: 30.45 KG/M2 | HEIGHT: 67 IN | WEIGHT: 194 LBS

## 2024-08-27 DIAGNOSIS — C64.9 MALIGNANT NEOPLASM OF UNSPECIFIED KIDNEY, EXCEPT RENAL PELVIS: ICD-10-CM

## 2024-08-27 PROCEDURE — 99024 POSTOP FOLLOW-UP VISIT: CPT

## 2024-08-27 NOTE — PHYSICAL EXAM
[General Appearance - Well Developed] : well developed [General Appearance - Well Nourished] : well nourished [Heart Rate And Rhythm] : heart rate and rhythm were normal [Abdomen Soft] : soft [] : no respiratory distress [Normal Station and Gait] : the gait and station were normal for the patient's age [Skin Turgor] : supple [No Focal Deficits] : no focal deficits [Sensation] : the sensory exam was normal to light touch and pinprick [Oriented To Time, Place, And Person] : oriented to person, place, and time [Affect] : the affect was normal [de-identified] : SP incision well healed

## 2024-08-27 NOTE — ASSESSMENT
[FreeTextEntry1] : 64M here for f/u for pathology results (s/p Robot assisted omental mass resection) 8/8/24  #RCC -Pt with recurrence of RCC of peritoneal mass with negative margins. Pt aware to f/u in 3 months with repeat CT AP for monitoring- no further treatment currently at this time.  RTO in 3 months to discuss imaging results.

## 2024-08-27 NOTE — HISTORY OF PRESENT ILLNESS
[None] : no symptoms [FreeTextEntry1] : : Aug 27 1960  Referring Provider: none   HPI: Mr. YESI ESPAÑA is a 62 year yo M with a PMHx notable for HTN, CAD on ASA/Plavix. He has history of kidney stones. He presented to the emergency department at [facility] on  with left flank pain and gross hematuria. A non contrast CT demonstrated an 8 cm left upper pole renal mass, 1 cm para-aortic LN. There were also two parenchymal nodules in the left lower lobe of the lung. He was discharged to home. No longer with flank pain, hematuria. He smoked for ~10 years, approx 1 pack per week but quit 30 years ago. He is a former  but now retired.  No headaches / vision changes, no bony pain.   CT chest with two pulmonary nodules 9 mm and 6 mm stable in appearance.   He saw his cardiologist and demonstrated nuclear stress test with EF 51% and no evidence of ischmeia. He was told to stop his plavix 1 week prior to OR.  Anticoagulation: Aspirin / plavix All: NKDA Social: Occasional EtOH. Smoked for ~10 years, 1 pack / week. Quit ~30 years ago. He is on disability due to his medical comorbidities PMHx: CAD s/p PCI , AICD  PSHx: No abdominal surgeries FHx: Father with prostate -  of prostate cancer. He was 88. No family history of kidney cancer Labs:  2023:  Creatinine 1.72 AST: 16; ALT: 15; Calcium: 9  Imaging: CT Urogram and CT chest in PACS with large L upper pole mass with possible adherence to the spleen.  : Here today s/p RAL L Nx, adrenalectomy and RPLND with pathology with pT3a ccRCC G2 N0 (). L testicular discomfort resolved. Saw Dr. Cueto for adjuvant immunotherapy. He was supposed to get 5 cycles. CT chest  had some stable left lower lobe nodules and CT AP due for end of May, ordered. He was noted to also have hypothyroid from the immunotherapy. Will check labs. UA without blood and protein. Also had FRANKLYN from the immunotherapy as well, has been stopped for a while now.   : Here today doing well, pain controlled.  CT chest revealed two subcentimeter nodules. He is likely to have stage III (pT3aN0). His two subcentimeter nodules in the LLL will be monitored and currently are too small to be characterized. Given his T3a disease with high risk of recurrence. Keynote 564 demonstrated adjuvant keytruda for 1 year vs placebo significantly prolonged DFS. He started keytruda in 2022. Patient was receiving adjuvant keytruda of ccRCC, he completed 5 cycles as of 3/6/23 and then was lost to follow up. Imaging in 2023, CT Chest showed stable LLL nodules and b/l lower lobe ground glass opacities, MR AP showed no evidence of recurrence or metastasis. MR AP 2023 no evidence of recurrence or metastasis. No recent PSA. No urinary symptoms. No hematuria.  : Patient presents today for follow up, he is doing well. He denies flank pain, urgency, hematuria and dysuria. He is staying hydrated. No flank pain or hematuria. MR in January was negative for recurrence. Pending repeat imaging of chest in January. Completed adjuvant immunotherpay, No symptoms. Prior Cr reviewed, 1.72 mgdL , penidng repeat labs and UA today. We discussed the fact that PSA is a nonspecific test for cancer although it is prostate specific. We have reviewed the fact that elevations can be caused by multiple separate processes with the prostate including prostate cancer, benign prostate enlargement, prostate inflammation or infection, or combination of any of the above. We also reviewed that procedures involving catheterizations or manipulation of the prostate including colonoscopy could cause PSA to be elevated. I also have reviewed with the patient that there is age specificity related to PSA and that over time there is some expectation that the PSA will slowly rise over time. PSA today.   24 Pt presents today for pathology results discussion- pt with recurrence of RCC of peritoneal mass with negative margins. Pt aware to f/u in 3 months with repeat CT AP for monitoring- no further treatment  currently at this time.  Peritoneal mass with RCC on pathology, margins negative. We discussed his case at MD which had recommended following him closely with repeat imaging, which we will repeat CT A/P and chest in 3 months.  [Urinary Incontinence] : no urinary incontinence [Urinary Retention] : no urinary retention [Urinary Urgency] : no urinary urgency [Urinary Frequency] : no urinary frequency

## 2024-09-30 ENCOUNTER — TRANSCRIPTION ENCOUNTER (OUTPATIENT)
Age: 64
End: 2024-09-30

## 2024-09-30 ENCOUNTER — EMERGENCY (EMERGENCY)
Facility: HOSPITAL | Age: 64
LOS: 1 days | Discharge: HOME | End: 2024-09-30
Attending: EMERGENCY MEDICINE | Admitting: EMERGENCY MEDICINE
Payer: MEDICARE

## 2024-09-30 VITALS
OXYGEN SATURATION: 100 % | TEMPERATURE: 98 F | SYSTOLIC BLOOD PRESSURE: 144 MMHG | HEART RATE: 60 BPM | RESPIRATION RATE: 17 BRPM | DIASTOLIC BLOOD PRESSURE: 89 MMHG

## 2024-09-30 VITALS
WEIGHT: 190.04 LBS | HEART RATE: 77 BPM | HEIGHT: 67 IN | RESPIRATION RATE: 17 BRPM | SYSTOLIC BLOOD PRESSURE: 135 MMHG | DIASTOLIC BLOOD PRESSURE: 91 MMHG | TEMPERATURE: 98 F | OXYGEN SATURATION: 97 %

## 2024-09-30 DIAGNOSIS — Z90.5 ACQUIRED ABSENCE OF KIDNEY: Chronic | ICD-10-CM

## 2024-09-30 DIAGNOSIS — Z95.810 PRESENCE OF AUTOMATIC (IMPLANTABLE) CARDIAC DEFIBRILLATOR: Chronic | ICD-10-CM

## 2024-09-30 LAB
ALBUMIN SERPL ELPH-MCNC: 4.5 G/DL — SIGNIFICANT CHANGE UP (ref 3.3–5)
ALP SERPL-CCNC: 79 U/L — SIGNIFICANT CHANGE UP (ref 40–120)
ALT FLD-CCNC: 12 U/L — SIGNIFICANT CHANGE UP (ref 4–41)
ANION GAP SERPL CALC-SCNC: 9 MMOL/L — SIGNIFICANT CHANGE UP (ref 7–14)
APPEARANCE UR: CLEAR — SIGNIFICANT CHANGE UP
APTT BLD: 40.8 SEC — HIGH (ref 24.5–35.6)
AST SERPL-CCNC: 16 U/L — SIGNIFICANT CHANGE UP (ref 4–40)
BASOPHILS # BLD AUTO: 0.03 K/UL — SIGNIFICANT CHANGE UP (ref 0–0.2)
BASOPHILS NFR BLD AUTO: 0.5 % — SIGNIFICANT CHANGE UP (ref 0–2)
BILIRUB SERPL-MCNC: 0.8 MG/DL — SIGNIFICANT CHANGE UP (ref 0.2–1.2)
BILIRUB UR-MCNC: NEGATIVE — SIGNIFICANT CHANGE UP
BUN SERPL-MCNC: 24 MG/DL — HIGH (ref 7–23)
CALCIUM SERPL-MCNC: 9.7 MG/DL — SIGNIFICANT CHANGE UP (ref 8.4–10.5)
CHLORIDE SERPL-SCNC: 102 MMOL/L — SIGNIFICANT CHANGE UP (ref 98–107)
CO2 SERPL-SCNC: 24 MMOL/L — SIGNIFICANT CHANGE UP (ref 22–31)
COLOR SPEC: YELLOW — SIGNIFICANT CHANGE UP
CREAT SERPL-MCNC: 1.62 MG/DL — HIGH (ref 0.5–1.3)
DIFF PNL FLD: NEGATIVE — SIGNIFICANT CHANGE UP
EGFR: 47 ML/MIN/1.73M2 — LOW
EOSINOPHIL # BLD AUTO: 0.15 K/UL — SIGNIFICANT CHANGE UP (ref 0–0.5)
EOSINOPHIL NFR BLD AUTO: 2.5 % — SIGNIFICANT CHANGE UP (ref 0–6)
GLUCOSE SERPL-MCNC: 93 MG/DL — SIGNIFICANT CHANGE UP (ref 70–99)
GLUCOSE UR QL: NEGATIVE MG/DL — SIGNIFICANT CHANGE UP
HCT VFR BLD CALC: 46.3 % — SIGNIFICANT CHANGE UP (ref 39–50)
HGB BLD-MCNC: 15.3 G/DL — SIGNIFICANT CHANGE UP (ref 13–17)
IANC: 4.27 K/UL — SIGNIFICANT CHANGE UP (ref 1.8–7.4)
IMM GRANULOCYTES NFR BLD AUTO: 0.3 % — SIGNIFICANT CHANGE UP (ref 0–0.9)
INR BLD: 1.09 RATIO — SIGNIFICANT CHANGE UP (ref 0.85–1.16)
KETONES UR-MCNC: NEGATIVE MG/DL — SIGNIFICANT CHANGE UP
LEUKOCYTE ESTERASE UR-ACNC: NEGATIVE — SIGNIFICANT CHANGE UP
LYMPHOCYTES # BLD AUTO: 1.08 K/UL — SIGNIFICANT CHANGE UP (ref 1–3.3)
LYMPHOCYTES # BLD AUTO: 17.8 % — SIGNIFICANT CHANGE UP (ref 13–44)
MCHC RBC-ENTMCNC: 28.1 PG — SIGNIFICANT CHANGE UP (ref 27–34)
MCHC RBC-ENTMCNC: 33 GM/DL — SIGNIFICANT CHANGE UP (ref 32–36)
MCV RBC AUTO: 85.1 FL — SIGNIFICANT CHANGE UP (ref 80–100)
MONOCYTES # BLD AUTO: 0.51 K/UL — SIGNIFICANT CHANGE UP (ref 0–0.9)
MONOCYTES NFR BLD AUTO: 8.4 % — SIGNIFICANT CHANGE UP (ref 2–14)
NEUTROPHILS # BLD AUTO: 4.27 K/UL — SIGNIFICANT CHANGE UP (ref 1.8–7.4)
NEUTROPHILS NFR BLD AUTO: 70.5 % — SIGNIFICANT CHANGE UP (ref 43–77)
NITRITE UR-MCNC: NEGATIVE — SIGNIFICANT CHANGE UP
NRBC # BLD: 0 /100 WBCS — SIGNIFICANT CHANGE UP (ref 0–0)
NRBC # FLD: 0 K/UL — SIGNIFICANT CHANGE UP (ref 0–0)
NT-PROBNP SERPL-SCNC: 87 PG/ML — SIGNIFICANT CHANGE UP
PH UR: 6 — SIGNIFICANT CHANGE UP (ref 5–8)
PLATELET # BLD AUTO: 171 K/UL — SIGNIFICANT CHANGE UP (ref 150–400)
POTASSIUM SERPL-MCNC: 4.8 MMOL/L — SIGNIFICANT CHANGE UP (ref 3.5–5.3)
POTASSIUM SERPL-SCNC: 4.8 MMOL/L — SIGNIFICANT CHANGE UP (ref 3.5–5.3)
PROT SERPL-MCNC: 7.9 G/DL — SIGNIFICANT CHANGE UP (ref 6–8.3)
PROT UR-MCNC: NEGATIVE MG/DL — SIGNIFICANT CHANGE UP
PROTHROM AB SERPL-ACNC: 12.6 SEC — SIGNIFICANT CHANGE UP (ref 9.9–13.4)
RBC # BLD: 5.44 M/UL — SIGNIFICANT CHANGE UP (ref 4.2–5.8)
RBC # FLD: 13.3 % — SIGNIFICANT CHANGE UP (ref 10.3–14.5)
SODIUM SERPL-SCNC: 135 MMOL/L — SIGNIFICANT CHANGE UP (ref 135–145)
SP GR SPEC: 1.02 — SIGNIFICANT CHANGE UP (ref 1–1.03)
TROPONIN T, HIGH SENSITIVITY RESULT: 10 NG/L — SIGNIFICANT CHANGE UP
UROBILINOGEN FLD QL: 0.2 MG/DL — SIGNIFICANT CHANGE UP (ref 0.2–1)
WBC # BLD: 6.06 K/UL — SIGNIFICANT CHANGE UP (ref 3.8–10.5)
WBC # FLD AUTO: 6.06 K/UL — SIGNIFICANT CHANGE UP (ref 3.8–10.5)

## 2024-09-30 PROCEDURE — 71250 CT THORAX DX C-: CPT | Mod: 26,MC

## 2024-09-30 PROCEDURE — 99284 EMERGENCY DEPT VISIT MOD MDM: CPT

## 2024-09-30 PROCEDURE — 74176 CT ABD & PELVIS W/O CONTRAST: CPT | Mod: 26,MC

## 2024-09-30 RX ORDER — DIAZEPAM 10 MG
5 TABLET ORAL ONCE
Refills: 0 | Status: DISCONTINUED | OUTPATIENT
Start: 2024-09-30 | End: 2024-09-30

## 2024-09-30 RX ORDER — DIAZEPAM 10 MG
1 TABLET ORAL
Qty: 4 | Refills: 0
Start: 2024-09-30 | End: 2024-10-03

## 2024-09-30 RX ORDER — LIDOCAINE/BENZALKONIUM/ALCOHOL
1 SOLUTION, NON-ORAL TOPICAL ONCE
Refills: 0 | Status: COMPLETED | OUTPATIENT
Start: 2024-09-30 | End: 2024-09-30

## 2024-09-30 RX ORDER — ACETAMINOPHEN 325 MG/1
1000 TABLET ORAL ONCE
Refills: 0 | Status: COMPLETED | OUTPATIENT
Start: 2024-09-30 | End: 2024-09-30

## 2024-09-30 RX ADMIN — ACETAMINOPHEN 1000 MILLIGRAM(S): 325 TABLET ORAL at 11:20

## 2024-09-30 RX ADMIN — Medication 5 MILLIGRAM(S): at 12:35

## 2024-09-30 RX ADMIN — ACETAMINOPHEN 400 MILLIGRAM(S): 325 TABLET ORAL at 10:47

## 2024-09-30 RX ADMIN — Medication 1 PATCH: at 10:48

## 2024-09-30 RX ADMIN — ACETAMINOPHEN 1000 MILLIGRAM(S): 325 TABLET ORAL at 11:03

## 2024-09-30 NOTE — ED PROVIDER NOTE - PHYSICAL EXAMINATION
VITAL SIGNS: I have reviewed nursing notes and confirm.  CONSTITUTIONAL:  in no acute distress.  SKIN: Skin exam is warm and dry, no acute rash.  HEAD: Normocephalic; atraumatic.  CARD: Regular rate and rhythm.  RESP: No wheezes,  no rales or rhonchi.   ABD:  soft; non-distended; non-tender;   EXT: Normal ROM. No clubbing, cyanosis or edema.

## 2024-09-30 NOTE — ED PROVIDER NOTE - NSFOLLOWUPINSTRUCTIONS_ED_ALL_ED_FT
You were seen in the emergency department for low back pain. We have evaluated you and determined that you do not require further hospital interventions.    Please  your prescription from [facility].     Please follow up with your primary care provider as necessary to discuss the results of your stay in our department.    If you start to experience worsening symptoms such as pain, inability to walk, fever, dizziness , please return to the emergency department for further evaluation.  Dolor de espalda    LO QUE NECESITA SABER:    ¿Qué necesito saber acerca del dolor de espalda?El dolor de espalda es común. Usted puede tener dolor de espalda y espasmos musculares. Usted puede estar adolorido o sentir rigidez en francisco o ambos lados de la espalda. El dolor se puede propagar a la parte baja del cuerpo.    ¿Qué aumenta mi riesgo de tener dolor de espalda?    Rimma enfermedad que afecta la columna vertebral, las articulaciones o los músculos, jay la tensión muscular o los problemas de disco    Inclinarse o levantar de rimma forma repetitiva o girar o levantar artículos pesados    Lesión debido a rimma caída o accidente    Falta de actividad física regular    Obesidad o embarazo    Fumar    Envejecimiento    Manejar, estar sentado o de pie por mucho tiempo    Michelle postura mientras está sentado o de pie  ¿Cómo se diagnostica head dolor de espalda?Head médico le preguntará si tiene alguna condición médica. Él podría preguntarle si tiene antecedentes de dolor de espalda y cómo comenzó. Observará cómo se pone de pie y camina y revisará head rango de movimiento. Muéstrele dónde siente el dolor y qué lo mejora o lo empeora. Explique el dolor, que tan raisa es y el tiempo que le dura. Dígale si el dolor empeora por las noches o cuando se recuesta boca arriba.    ¿Cuál es el tratamiento para el dolor de espalda?    Medicamentos:  AINEpueden disminuir la inflamación y el dolor o la fiebre. Alyssa medicamento está disponible con o sin rimma receta médica. Los EUNICE pueden causar sangrado estomacal o problemas renales en ciertas personas. Si usted jethro un medicamento anticoagulante, siempre pregúntele a head médico si los EUNICE son seguros para usted. Siempre aviva la etiqueta de alyssa medicamento y siga las instrucciones.    Acetaminofénalivia el dolor y baja la fiebre. Está disponible sin receta médica. Pregunte la cantidad y la frecuencia con que debe tomarlos. Siga las indicaciones. Aviva las etiquetas de todos los demás medicamentos que esté usando para saber si también contienen acetaminofén, o pregunte a head médico o farmacéutico. El acetaminofén puede causar daño en el hígado cuando no se jethro de forma correcta.    Relajantes muscularesayudan a disminuir los espasmos musculares y el dolor de espalda.    La acupresiónse puede recomendar para disminuir el dolor y mejorar el movimiento. La acupresión es rimma presión o un masaje localizado en la ronaldo del dolor de espalda.    Rimma unidad de electroestimulación nerviosa transcutánea (TENS)es un dispositivo portátil, tamaño de bolsillo, alimentado por batería, que se adhiere a la piel. Por lo general se coloca sobre el área dolorida. Usa señales eléctricas leves y seguras para ayudar a controlar el dolor.  ¿Cómo puedo controlar el dolor de espalda?    Aplique hieloen la espalda de 15 a 20 minutos cada hora o jay se le indique. Use rimma compresa de hielo o ponga hielo triturado en rimma bolsa de plástico. Cúbralo con rimma toalla antes de aplicarlo sobre head piel. El hielo disminuye el dolor y ayuda a evitar el daño en los tejidos.    Aplique caloren la espalda de 20 a 30 minutos cada 2 horas por los días que le indiquen. El calor ayuda a disminuir el dolor y los espasmos musculares.    Manténgase activolo más que pueda sin causar más dolor. El reposo en cama puede empeorar head dolor de espalda. Evite levantar objetos hasta que ya no tenga dolor.  Jemal hispana caminando jay ejercicio      Vaya a terapia físicasegún las indicaciones. Un fisioterapeuta puede enseñarle ejercicios que contribuyan a mejorar head movimiento y fuerza, y a aliviar el dolor.  Llame al número de emergencias local (911 en los [place]) si:    Usted tiene dolor de espalda intenso con dolor en el pecho.    Usted no puede controlar head orina ni raymundo deposiciones intestinales.    El dolor se vuelve tan intenso que lo incapacita para caminar.  ¿Cuándo monica buscar atención inmediata?    Usted tiene dolor, entumecimiento o debilidad en rimma o en ambas piernas.    Usted tiene dolor de espalda intenso, náuseas y vómito.    Usted tiene un dolor de espalda intenso que se propaga a un costado o al área genital.  ¿Cuándo monica llamar a mi médico?    Usted tiene dolor de espalda que no mejora con el reposo, ni con el medicamento para el dolor.    Tiene fiebre.    Usted tiene un dolor que empeora cuando está acostado boca arriba o al descansar.    Usted tiene dolor que empeora cuando tose o estornuda.    Usted pierde peso sin proponérselo.    Usted tiene preguntas o inquietudes acerca de head condición o cuidado.  ACUERDOS SOBRE HEAD CUIDADO:    Usted tiene el derecho de ayudar a planear head cuidado. Aprenda todo lo que pueda sobre head condición y jay darle tratamiento. Discuta raymundo opciones de tratamiento con raymundo médicos para decidir el cuidado que usted desea recibir. Usted siempre tiene el derecho de rechazar el tratamiento.

## 2024-09-30 NOTE — ED PROVIDER NOTE - PROGRESS NOTE DETAILS
Angelo Regan, DO PGY-1  Patient stated minimal improvement of pain. Discussed CT Chest/ Abd/ Pelvis findings, likely MSK. Patient stated understanding of management and return precautions. Will DC with rx Valium 5mb once at bedtime x 4 days.

## 2024-09-30 NOTE — ED ADULT NURSE NOTE - OBJECTIVE STATEMENT
Pt  c/o right sided upper/mid back pain starting 5 days ago. Pt reports having only one kidney and thinks pain is due to "drinking too much water" Phx HTN Denies injury/ UA symptoms  Patient brought to room 27. Patient evaluated for above complaints. Patient is alert and oriented times four. 20 gauge IV lock placed to left antecubital and labs drawn and sent. Patient had CT done as ordered and received medications for pain with some relief.  Waiting for results and disposition.  SIMBA Valencia

## 2024-09-30 NOTE — ED CLERICAL - NS ED CLERK NOTE PRE-ARRIVAL INFORMATION; ADDITIONAL PRE-ARRIVAL INFORMATION

## 2024-09-30 NOTE — ED PROVIDER NOTE - CLINICAL SUMMARY MEDICAL DECISION MAKING FREE TEXT BOX
64y Male w/ PMHx of CAD s/p stents,  chronic systolic CHF s/p AICD (2018), Stage III CKD, HLD, HTN, ischemic cardiomyopathy, renal cell carcinoma s/p  left-sided radical nephrectomy, left-sided adrenalectomy, & retroperitoneal lymphadenectomy presenting w/ back pain. Patient stated five days ago he gradually began to experience constant, focal right upper to mid back pain that is most noticeable upon deep inspiration. States no alleviating or exacerbating factors. Stated he was instructed by nephrologist to stay hydrated and believes he may be experiencing fluid overload in his lungs. Denies headache, cough, sneezing,  fever/chills, chest pain, SOB, flank pain, abd pain, n/v/d, dysuria, hematuria, trauma, contact w/ sicks person.  PE remarkable for well appearing patient in no acute distress. Lungs clear bilaterally, no wheezing or crackles. Abdomen soft, non-distended, non-tender. No flank pain. Skin warm and dry, no evidence of rashes. Spine midline w/ no bony deformities or step offs.    Will order CBC, CMP, Trop T, Pro-BNP, UA, ECG, PTT/PT/INR, & CT Chest/ Abd/ Pelvis w/ no contrast to rule out pulmonary edema, CHF exacerbation, nephrolithiasis, hydronephrosis. Mijasonbertos att: 64y Male w/ PMHx of CAD s/p stents,  chronic systolic CHF s/p AICD (2018), Stage III CKD, HLD, HTN, ischemic cardiomyopathy, renal cell carcinoma s/p  left-sided radical nephrectomy, left-sided adrenalectomy, & retroperitoneal lymphadenectomy presenting w/ back pain. Patient stated five days ago he gradually began to experience constant, focal right upper to mid back pain that is most noticeable upon deep inspiration. States no alleviating or exacerbating factors. Stated he was instructed by nephrologist to stay hydrated and believes he may be experiencing fluid overload in his lungs. Denies headache, cough, sneezing,  fever/chills, chest pain, SOB, flank pain, abd pain, n/v/d, dysuria, hematuria, trauma, contact w/ sicks person.  PE remarkable for well appearing patient in no acute distress. Lungs clear bilaterally, no wheezing or crackles. Abdomen soft, non-distended, non-tender. No flank pain. Skin warm and dry, no evidence of rashes. Spine midline w/ no bony deformities or step offs.    Will order CBC, CMP, Trop T, Pro-BNP, UA, ECG, PTT/PT/INR, & CT Chest/ Abd/ Pelvis w/ no contrast to rule out pulmonary edema, CHF exacerbation, nephrolithiasis, hydronephrosis.

## 2024-09-30 NOTE — ED ADULT TRIAGE NOTE - CHIEF COMPLAINT QUOTE
Pt  c/o right sided upper/mid back pain starting 5 days ago. Pt reports having only one kidney and thinks pain is due to "drinking too much water" Phx HTN Denies injury/ UA symptoms

## 2024-09-30 NOTE — ED PROVIDER NOTE - PATIENT PORTAL LINK FT
You can access the FollowMyHealth Patient Portal offered by [facility] by registering at the following website: [email or web address]. By joining [employer]’s FollowMyHealth portal, you will also be able to view your health information using other applications (apps) compatible with our system.

## 2024-09-30 NOTE — ED PROVIDER NOTE - OBJECTIVE STATEMENT
64y Male w/ PMHx of CAD s/p stents,  chronic systolic CHF s/p AICD (2018), Stage III CKD, HLD, HTN, ischemic cardiomyopathy, renal cell carcinoma s/p  left-sided radical nephrectomy, left-sided adrenalectomy, & retroperitoneal lymphadenectomy presenting w/ back pain. Patient stated five days ago he gradually began to experience constant, focal right upper to mid back pain that is most noticeable upon deep inspiration. States no alleviating or exacerbating factors. Stated he was instructed by nephrologist to stay hydrated and believes he may be experiencing fluid overload in his lungs. Denies headache, cough, sneezing,  fever/chills, chest pain, SOB, flank pain, abd pain, n/v/d, dysuria, hematuria, trauma, contact w/ sicks person.

## 2024-10-01 ENCOUNTER — EMERGENCY (EMERGENCY)
Facility: HOSPITAL | Age: 64
LOS: 1 days | Discharge: HOME | End: 2024-10-01
Attending: PERSONAL EMERGENCY RESPONSE ATTENDANT | Admitting: PERSONAL EMERGENCY RESPONSE ATTENDANT
Payer: MEDICARE

## 2024-10-01 ENCOUNTER — APPOINTMENT (OUTPATIENT)
Dept: HOME HEALTH SERVICES | Facility: HOME HEALTH | Age: 64
End: 2024-10-01

## 2024-10-01 VITALS
DIASTOLIC BLOOD PRESSURE: 80 MMHG | TEMPERATURE: 98 F | RESPIRATION RATE: 17 BRPM | SYSTOLIC BLOOD PRESSURE: 151 MMHG | WEIGHT: 190.04 LBS | OXYGEN SATURATION: 100 % | HEART RATE: 86 BPM | HEIGHT: 67 IN

## 2024-10-01 VITALS
TEMPERATURE: 98 F | DIASTOLIC BLOOD PRESSURE: 75 MMHG | RESPIRATION RATE: 16 BRPM | SYSTOLIC BLOOD PRESSURE: 119 MMHG | OXYGEN SATURATION: 98 % | HEART RATE: 67 BPM

## 2024-10-01 DIAGNOSIS — Z90.5 ACQUIRED ABSENCE OF KIDNEY: Chronic | ICD-10-CM

## 2024-10-01 DIAGNOSIS — Z95.810 PRESENCE OF AUTOMATIC (IMPLANTABLE) CARDIAC DEFIBRILLATOR: Chronic | ICD-10-CM

## 2024-10-01 LAB
ADD ON TEST-SPECIMEN IN LAB: SIGNIFICANT CHANGE UP
ALBUMIN SERPL ELPH-MCNC: 4.7 G/DL — SIGNIFICANT CHANGE UP (ref 3.3–5)
ALP SERPL-CCNC: 83 U/L — SIGNIFICANT CHANGE UP (ref 40–120)
ALT FLD-CCNC: 12 U/L — SIGNIFICANT CHANGE UP (ref 4–41)
ANION GAP SERPL CALC-SCNC: 14 MMOL/L — SIGNIFICANT CHANGE UP (ref 7–14)
AST SERPL-CCNC: 16 U/L — SIGNIFICANT CHANGE UP (ref 4–40)
BASOPHILS # BLD AUTO: 0.05 K/UL — SIGNIFICANT CHANGE UP (ref 0–0.2)
BASOPHILS NFR BLD AUTO: 0.7 % — SIGNIFICANT CHANGE UP (ref 0–2)
BILIRUB SERPL-MCNC: 0.7 MG/DL — SIGNIFICANT CHANGE UP (ref 0.2–1.2)
BLOOD GAS VENOUS COMPREHENSIVE RESULT: SIGNIFICANT CHANGE UP
BUN SERPL-MCNC: 21 MG/DL — SIGNIFICANT CHANGE UP (ref 7–23)
CALCIUM SERPL-MCNC: 9.6 MG/DL — SIGNIFICANT CHANGE UP (ref 8.4–10.5)
CHLORIDE SERPL-SCNC: 102 MMOL/L — SIGNIFICANT CHANGE UP (ref 98–107)
CO2 SERPL-SCNC: 21 MMOL/L — LOW (ref 22–31)
CREAT SERPL-MCNC: 1.69 MG/DL — HIGH (ref 0.5–1.3)
EGFR: 45 ML/MIN/1.73M2 — LOW
EOSINOPHIL # BLD AUTO: 0.13 K/UL — SIGNIFICANT CHANGE UP (ref 0–0.5)
EOSINOPHIL NFR BLD AUTO: 1.7 % — SIGNIFICANT CHANGE UP (ref 0–6)
GLUCOSE SERPL-MCNC: 99 MG/DL — SIGNIFICANT CHANGE UP (ref 70–99)
HCT VFR BLD CALC: 46.7 % — SIGNIFICANT CHANGE UP (ref 39–50)
HGB BLD-MCNC: 15.5 G/DL — SIGNIFICANT CHANGE UP (ref 13–17)
IANC: 5.38 K/UL — SIGNIFICANT CHANGE UP (ref 1.8–7.4)
IMM GRANULOCYTES NFR BLD AUTO: 0.5 % — SIGNIFICANT CHANGE UP (ref 0–0.9)
LYMPHOCYTES # BLD AUTO: 1.21 K/UL — SIGNIFICANT CHANGE UP (ref 1–3.3)
LYMPHOCYTES # BLD AUTO: 15.8 % — SIGNIFICANT CHANGE UP (ref 13–44)
MCHC RBC-ENTMCNC: 27.8 PG — SIGNIFICANT CHANGE UP (ref 27–34)
MCHC RBC-ENTMCNC: 33.2 GM/DL — SIGNIFICANT CHANGE UP (ref 32–36)
MCV RBC AUTO: 83.7 FL — SIGNIFICANT CHANGE UP (ref 80–100)
MONOCYTES # BLD AUTO: 0.87 K/UL — SIGNIFICANT CHANGE UP (ref 0–0.9)
MONOCYTES NFR BLD AUTO: 11.3 % — SIGNIFICANT CHANGE UP (ref 2–14)
NEUTROPHILS # BLD AUTO: 5.38 K/UL — SIGNIFICANT CHANGE UP (ref 1.8–7.4)
NEUTROPHILS NFR BLD AUTO: 70 % — SIGNIFICANT CHANGE UP (ref 43–77)
NRBC # BLD: 0 /100 WBCS — SIGNIFICANT CHANGE UP (ref 0–0)
NRBC # FLD: 0 K/UL — SIGNIFICANT CHANGE UP (ref 0–0)
PLATELET # BLD AUTO: 199 K/UL — SIGNIFICANT CHANGE UP (ref 150–400)
POTASSIUM SERPL-MCNC: 4.3 MMOL/L — SIGNIFICANT CHANGE UP (ref 3.5–5.3)
POTASSIUM SERPL-SCNC: 4.3 MMOL/L — SIGNIFICANT CHANGE UP (ref 3.5–5.3)
PROT SERPL-MCNC: 8.1 G/DL — SIGNIFICANT CHANGE UP (ref 6–8.3)
RBC # BLD: 5.58 M/UL — SIGNIFICANT CHANGE UP (ref 4.2–5.8)
RBC # FLD: 13.7 % — SIGNIFICANT CHANGE UP (ref 10.3–14.5)
SODIUM SERPL-SCNC: 137 MMOL/L — SIGNIFICANT CHANGE UP (ref 135–145)
WBC # BLD: 7.68 K/UL — SIGNIFICANT CHANGE UP (ref 3.8–10.5)
WBC # FLD AUTO: 7.68 K/UL — SIGNIFICANT CHANGE UP (ref 3.8–10.5)

## 2024-10-01 PROCEDURE — 99284 EMERGENCY DEPT VISIT MOD MDM: CPT

## 2024-10-01 PROCEDURE — 93010 ELECTROCARDIOGRAM REPORT: CPT

## 2024-10-01 RX ORDER — LIDOCAINE/BENZALKONIUM/ALCOHOL
1 SOLUTION, NON-ORAL TOPICAL ONCE
Refills: 0 | Status: COMPLETED | OUTPATIENT
Start: 2024-10-01 | End: 2024-10-01

## 2024-10-01 RX ORDER — ACETAMINOPHEN 325 MG/1
1000 TABLET ORAL ONCE
Refills: 0 | Status: COMPLETED | OUTPATIENT
Start: 2024-10-01 | End: 2024-10-01

## 2024-10-01 RX ORDER — METHOCARBAMOL 750 MG/1
1 TABLET, FILM COATED ORAL
Qty: 20 | Refills: 0
Start: 2024-10-01 | End: 2024-10-05

## 2024-10-01 RX ORDER — METHOCARBAMOL 750 MG/1
750 TABLET, FILM COATED ORAL ONCE
Refills: 0 | Status: COMPLETED | OUTPATIENT
Start: 2024-10-01 | End: 2024-10-01

## 2024-10-01 RX ADMIN — ACETAMINOPHEN 400 MILLIGRAM(S): 325 TABLET ORAL at 15:59

## 2024-10-01 RX ADMIN — METHOCARBAMOL 750 MILLIGRAM(S): 750 TABLET, FILM COATED ORAL at 15:58

## 2024-10-01 RX ADMIN — Medication 1 PATCH: at 10:23

## 2024-10-01 RX ADMIN — ACETAMINOPHEN 400 MILLIGRAM(S): 325 TABLET ORAL at 10:23

## 2024-10-01 RX ADMIN — METHOCARBAMOL 750 MILLIGRAM(S): 750 TABLET, FILM COATED ORAL at 10:23

## 2024-10-01 NOTE — ED PROVIDER NOTE - ATTENDING CONTRIBUTION TO CARE
Attending MD Vincent:  I performed a history and physical exam of the patient and discussed their management with the resident. I reviewed the resident's note and agree with the documented findings and plan of care. My medical decision making and observations are found above.

## 2024-10-01 NOTE — ED PROVIDER NOTE - PATIENT PORTAL LINK FT
You can access the FollowMyHealth Patient Portal offered by [facility] by registering at the following website: [email or web address]. By joining [employer]’s FollowMyHealth portal, you will also be able to view your health information using other applications (apps) compatible with our system.

## 2024-10-01 NOTE — ED ADULT NURSE NOTE - NSFALLCONCLUSION_ED_ALL_ED
Jason Sandoval, a 34 y.o. male presents to the ED w/ complaint of     Triage note:  Chief Complaint   Patient presents with    Ear Fullness     Reports sinus pressure and ear muffledness x 3 days. Trying otc drops and this isn't helping. Sore throat yesterday but not today. No fever, cp, sob.     Review of patient's allergies indicates:  No Known Allergies  No past medical history on file. Patient identifiers for Jason Sandoval checked and correct.    LOC: The patient is awake, alert and aware of environment with an appropriate affect, the patient is oriented x 4 and speaking appropriately.    APPEARANCE: Patient resting comfortably and in no acute distress, patient is clean and well groomed, patient's clothing is properly fastened.    SKIN: The skin is warm and dry, color consistent with ethnicity, patient has normal skin turgor and moist mucus membranes, skin intact, no breakdown or bruising noted.    MUSCULOSKELETAL: Patient moving all extremities well, no obvious swelling or deformities noted.    RESPIRATORY: Airway is open and patent, respirations are spontaneous and even, patient has a normal effort and rate.    CARDIAC: Patient has a normal rate and rhythm, no periphreal edema noted, capillary refill < 3 seconds.    ABDOMEN: Soft and non tender to palpation, no distention noted. Patient denies any nausea, vomiting, diarrhea, or constipation.     NEUROLOGIC: Eyes open spontaneously, PERRL, behavior appropriate to situation, follows commands, facial expression symmetrical, bilateral hand grasp equal and even, purposeful motor response noted, normal sensation in all extremities.     HEENT: White sclera and pupils equal round and reactive to light. Denies headache, dizziness. Ear fullness.     : Pt voids independently, denies dysuria, hematuria, frequency.     
Universal Safety Interventions

## 2024-10-01 NOTE — ED PROVIDER NOTE - PHYSICAL EXAMINATION
Gen: AAOx3, non-toxic  HEENT: NCAT. PEERLA, EOMI, oral mucosa moist, normal conjunctiva  Lung: CTAB, no respiratory distress, no wheezes/rhonchi/rales B/L, speaking in full sentences  CV: RRR, no murmurs, rubs or gallops  Abd: soft, NTND, no guarding, no CVA tenderness  MSK: no visible deformities. TTP right paraspinal w/o overlying erythema or fluctuance from approx T3-T9.   Neuro: No focal sensory or motor deficits  Skin: Warm, well perfused, no rash  Psych: normal affect. Gen: AAOx3, non-toxic  HEENT: NCAT. PEERLA, EOMI, oral mucosa moist, normal conjunctiva  Lung: CTAB, no respiratory distress, no wheezes/rhonchi/rales B/L, speaking in full sentences  CV: RRR, no murmurs, rubs or gallops  Abd: soft, NTND, no guarding, no CVA tenderness  MSK: no visible deformities. TTP right paraspinal w/o overlying erythema or fluctuance from approx T3-T9. Circular lesion noted on right back, which patient says from cupping.   Neuro: No focal sensory or motor deficits  Skin: Warm, well perfused, no rash  Psych: normal affect.

## 2024-10-01 NOTE — ED ADULT TRIAGE NOTE - CHIEF COMPLAINT QUOTE
Pt c/o right sided back pain that started about one week ago. Pt states he was seen here yesterday and discharged with valium to help relieve the pain. Pt states the pain is worse today and he is unable to move or sit down. Denies urinary symptoms, urinary or bowel incontinence and numbness and tingling at this time. Arrives with discharge paper work. Hx of CKD, CAD, hypothyroidism.

## 2024-10-01 NOTE — ED ADULT NURSE NOTE - OBJECTIVE STATEMENT
This is a 64 y/0 male alert and oriented x 4, past medical history of CKD, HTN, thyroid problems. Complaining today of recurrent upper back pain, return visit from yesterday for the same complaints Denies injury to back. . Patient appears uncomfortable, pain is worse with movement, pain is 7/10. Denies numbness nor tingling on all four extremities, no deficits on movement. Breathing is even and unlabored, IV initiated on left hand g 20 blood work sent to lab, due meds given.

## 2024-10-01 NOTE — ED PROVIDER NOTE - CLINICAL SUMMARY MEDICAL DECISION MAKING FREE TEXT BOX
64M, PMHx of CAD s/p stents,  chronic systolic CHF s/p AICD (2018), Stage III CKD, HLD, HTN, ischemic cardiomyopathy, renal cell carcinoma s/p  left-sided radical nephrectomy, left-sided adrenalectomy, & retroperitoneal lymphadenectomy presenting w/ back pain. Patient states pain is located right paraspinal, began acutely with no clear provoking factor, and has progressively worsened since beginning 6 days ago.  Patient was evaluated at [facility] ED yesterday, received extensive workup including CT of the chest, abdomen, pelvis without contrast given CKD status.  Workup yesterday was unremarkable.  Patient was discharged home with Valium and strict return precautions.  Today, patient returns ED stating that pain has continued to worsen despite treatment, with pain every time the patient "moves, sneezes, coughs."  Patient denies new trauma, fever, chills, shortness of breath, chest pain, abdominal pain, N/V/D/C, urinary symptoms, neurologic deficits including numbness/tingling, weakness. Denies saddle anesthesia, urinary/bowel retention/incontinence.    Hemodynamically stable, afebrile.  Patient ambulatory to room, no saddle anesthesia, urinary retention, limiting concern for acute spinal cord compression.  Workup yesterday unremarkable, pain has remained inconsistent location despite worsening, likely low yield to repeat imaging with CT at this time.  History and physical exam largely suggestive of MSK etiology, given lack of midline tenderness, radiculopathic symptoms, neurologic deficits.  Troponins, CT chest yesterday negative, limiting concern for pleuritic cause, cardiac etiology.  Will acquire labs, including creatinine kinase to evaluate for occult rhabdomyolysis.  Will augment medication regimen with Robaxin, escalation to oxycodone as necessary.  Will consider admission to CDU for MRI if unable to control pain.  Dispo per workup and reassessment. 64M, PMHx of CAD s/p stents,  chronic systolic CHF s/p AICD (2018), Stage III CKD, HLD, HTN, ischemic cardiomyopathy, renal cell carcinoma s/p  left-sided radical nephrectomy, left-sided adrenalectomy, & retroperitoneal lymphadenectomy presenting w/ back pain. Patient states pain is located right paraspinal, began acutely with no clear provoking factor, and has progressively worsened since beginning 6 days ago.  Patient was evaluated at [facility] ED yesterday, received extensive workup including CT of the chest, abdomen, pelvis without contrast given CKD status.  Workup yesterday was unremarkable.  Patient was discharged home with Valium and strict return precautions.  Today, patient returns ED stating that pain has continued to worsen despite treatment, with pain every time the patient "moves, sneezes, coughs."  Patient denies new trauma, fever, chills, shortness of breath, chest pain, abdominal pain, N/V/D/C, urinary symptoms, neurologic deficits including numbness/tingling, weakness. Denies saddle anesthesia, urinary/bowel retention/incontinence.    Hemodynamically stable, afebrile.  Patient ambulatory to room, no saddle anesthesia, urinary retention, limiting concern for acute spinal cord compression.  Workup yesterday unremarkable, pain has remained inconsistent location despite worsening, likely low yield to repeat imaging with CT at this time.  History and physical exam largely suggestive of MSK etiology, given lack of midline tenderness, radiculopathic symptoms, neurologic deficits.  Troponins, CT chest yesterday negative, limiting concern for pleuritic cause, cardiac etiology.  Will acquire labs, including creatinine kinase to evaluate for occult rhabdomyolysis.  Will augment medication regimen with Robaxin, escalation to oxycodone as necessary.  Will consider admission to CDU for MRI if unable to control pain.  Dispo per workup and reassessment.    Attending MD Vincent.  Agree with above.  Pt well appearing, neuro intact.  No CP.  Site exquisitely tender to touch without overlying skin findings aside from superficial cupping ecchymosis.  TTP over R paraspinal musculature inferior to R scapula.  NO palpable defects.  Pt broadly imaged yesterday.  Pain does not radiate to chest.  No neuro deficits.  Severe pain with any movement of chest/torso.  No rash. No spinal injections/cauda equina sxs.  Planned pain control and reassessment.  Will also screen for myopathies with CK. If pain improves with refer for expedited spine f/u.  If no improvement, will stay for expedited MRI.

## 2024-10-01 NOTE — ED PROVIDER NOTE - NSFOLLOWUPINSTRUCTIONS_ED_ALL_ED_FT
Usted fue atendido hoy en el departamento de emergencias por dolor de espalda.  Hemos cambiado el régimen de prescripción que le dimos diamond la visita de zenon.  Head dolor de espalda ahora ha marlene.    Estamos cambiando tu relajante muscular.  Por favor no tomes Valium.  Deséchelo en head farmacia.  Le daremos un medicamento llamado Robaxin.  Earlston alyssa medicamento según lo recetado.  Tenga en cuenta que alyssa medicamento puede provocar somnolencia, así que tenga cuidado al conducir o utilizar maquinaria.    Además de Robaxin, puedes una Motrin y Tylenol alternando cada 8 horas.  Earlston 500 mg de Tylenol y 600 mg de Motrin.  Por ejemplo, si jethro 600 mg de Motrin a las 10 a. m., luego tomará 500 mg de Tylenol a las 2 p. m., seguido de 600 mg de Motrin a las 6 p. m.    Regrese al departamento de emergencias si head dolor de espalda empeora, si desarrolla debilidad o cualquier otro síntoma que le preocupe.    Le hemos remitido a un médico de columna para que le realice rimma resonancia magnética.  Por favor elina un seguimiento con head clínica.  Le llamarán por teléfono para concertar rimma aaliyah.      You were seen in the emergency department today for back pain.  We have changed the prescription regimen that we gave you during yesterday's visit.  Your back pain has now improved.    We are changing your muscle relaxer.  Please do not take the Valium.  Please dispose of it at your pharmacy.  We are going to give you a medication called Robaxin.  Please take this medication as prescribed.  Please be advised that this medication can make you drowsy, so use caution when driving or operating machinery.    In addition to the Robaxin, you can take Motrin and Tylenol alternating every 8 hours.  Please take 500 mg of Tylenol, and 600 mg of Motrin.  For example, if you take 600 mg of Motrin at 10 AM, you would then take 500 mg of Tylenol at 2 PM, followed by 600 mg of Motrin at 6 PM.    Please return to the emergency department if your back pain worsens, if you develop weakness, or any other symptoms that you find concerning.    We have provided you with a referral to a spine doctor for MRI.  Please follow-up with their clinic.  They will give you a phone call to set up an appointment.

## 2024-10-01 NOTE — ED PROVIDER NOTE - PROGRESS NOTE DETAILS
DO Usha:  Pt reassessed, reports improvement in pain, now 4/10.  Workup today unremarkable. Pt offered potential CDU admission for MRI. Will discuss with CDU regarding possibility if beds available. DO Usha:  Pt reassessed, w/ continued improvement.  CDU evaluated, do not think pt would be good CDU candidate. SDM conversation had w/ pt, will pursue outpatient MRI w/ spine referral.

## 2024-10-01 NOTE — ED ADULT NURSE REASSESSMENT NOTE - NS ED NURSE REASSESS COMMENT FT1
Break RN: pt remains a&ox4, denying chest pain, sob, n+v, headache, dizziness. Breathing even, unlabored. Pt endorsing back pain. Safety maintained. Pending MD order.

## 2024-10-02 ENCOUNTER — APPOINTMENT (OUTPATIENT)
Dept: HOME HEALTH SERVICES | Facility: HOME HEALTH | Age: 64
End: 2024-10-02

## 2024-11-07 ENCOUNTER — APPOINTMENT (OUTPATIENT)
Dept: MRI IMAGING | Facility: HOSPITAL | Age: 64
End: 2024-11-07
Payer: MEDICARE

## 2024-11-07 ENCOUNTER — OUTPATIENT (OUTPATIENT)
Dept: OUTPATIENT SERVICES | Facility: HOSPITAL | Age: 64
LOS: 1 days | End: 2024-11-07
Payer: MEDICARE

## 2024-11-07 DIAGNOSIS — Z90.5 ACQUIRED ABSENCE OF KIDNEY: Chronic | ICD-10-CM

## 2024-11-07 DIAGNOSIS — Z95.810 PRESENCE OF AUTOMATIC (IMPLANTABLE) CARDIAC DEFIBRILLATOR: Chronic | ICD-10-CM

## 2024-11-07 DIAGNOSIS — C64.9 MALIGNANT NEOPLASM OF UNSPECIFIED KIDNEY, EXCEPT RENAL PELVIS: ICD-10-CM

## 2024-11-07 PROCEDURE — 74183 MRI ABD W/O CNTR FLWD CNTR: CPT | Mod: 26

## 2024-11-07 PROCEDURE — 72197 MRI PELVIS W/O & W/DYE: CPT | Mod: 26

## 2024-11-07 PROCEDURE — 71046 X-RAY EXAM CHEST 2 VIEWS: CPT | Mod: 26

## 2024-11-12 ENCOUNTER — NON-APPOINTMENT (OUTPATIENT)
Age: 64
End: 2024-11-12

## 2024-11-12 ENCOUNTER — APPOINTMENT (OUTPATIENT)
Dept: UROLOGY | Facility: CLINIC | Age: 64
End: 2024-11-12
Payer: MEDICARE

## 2024-11-12 VITALS
HEIGHT: 67 IN | WEIGHT: 192 LBS | DIASTOLIC BLOOD PRESSURE: 78 MMHG | OXYGEN SATURATION: 95 % | BODY MASS INDEX: 30.13 KG/M2 | HEART RATE: 69 BPM | SYSTOLIC BLOOD PRESSURE: 131 MMHG

## 2024-11-12 DIAGNOSIS — K66.9 DISORDER OF PERITONEUM, UNSPECIFIED: ICD-10-CM

## 2024-11-12 DIAGNOSIS — C64.9 MALIGNANT NEOPLASM OF UNSPECIFIED KIDNEY, EXCEPT RENAL PELVIS: ICD-10-CM

## 2024-11-12 PROCEDURE — 99213 OFFICE O/P EST LOW 20 MIN: CPT

## 2024-11-18 PROCEDURE — 74183 MRI ABD W/O CNTR FLWD CNTR: CPT

## 2024-11-18 PROCEDURE — 72197 MRI PELVIS W/O & W/DYE: CPT

## 2024-11-18 PROCEDURE — A9585: CPT

## 2024-11-18 PROCEDURE — 71046 X-RAY EXAM CHEST 2 VIEWS: CPT

## 2024-12-03 ENCOUNTER — OUTPATIENT (OUTPATIENT)
Dept: OUTPATIENT SERVICES | Facility: HOSPITAL | Age: 64
LOS: 1 days | Discharge: HOME | End: 2024-12-03
Payer: MEDICARE

## 2024-12-03 DIAGNOSIS — Z95.810 PRESENCE OF AUTOMATIC (IMPLANTABLE) CARDIAC DEFIBRILLATOR: Chronic | ICD-10-CM

## 2024-12-03 DIAGNOSIS — Z90.5 ACQUIRED ABSENCE OF KIDNEY: Chronic | ICD-10-CM

## 2024-12-14 ENCOUNTER — OUTPATIENT (OUTPATIENT)
Dept: OUTPATIENT SERVICES | Facility: HOSPITAL | Age: 64
LOS: 1 days | End: 2024-12-14
Payer: COMMERCIAL

## 2024-12-14 ENCOUNTER — APPOINTMENT (OUTPATIENT)
Dept: NUCLEAR MEDICINE | Facility: IMAGING CENTER | Age: 64
End: 2024-12-14
Payer: MEDICARE

## 2024-12-14 DIAGNOSIS — Z95.810 PRESENCE OF AUTOMATIC (IMPLANTABLE) CARDIAC DEFIBRILLATOR: Chronic | ICD-10-CM

## 2024-12-14 DIAGNOSIS — Z90.5 ACQUIRED ABSENCE OF KIDNEY: Chronic | ICD-10-CM

## 2024-12-14 DIAGNOSIS — C64.9 MALIGNANT NEOPLASM OF UNSPECIFIED KIDNEY, EXCEPT RENAL PELVIS: ICD-10-CM

## 2024-12-14 PROCEDURE — A9552: CPT

## 2024-12-14 PROCEDURE — 78816 PET IMAGE W/CT FULL BODY: CPT | Mod: 26,PI

## 2024-12-14 PROCEDURE — 78816 PET IMAGE W/CT FULL BODY: CPT

## 2024-12-18 ENCOUNTER — APPOINTMENT (OUTPATIENT)
Dept: RADIATION ONCOLOGY | Facility: CLINIC | Age: 64
End: 2024-12-18
Payer: MEDICARE

## 2024-12-18 ENCOUNTER — NON-APPOINTMENT (OUTPATIENT)
Age: 64
End: 2024-12-18

## 2024-12-18 VITALS
DIASTOLIC BLOOD PRESSURE: 82 MMHG | TEMPERATURE: 96.8 F | RESPIRATION RATE: 16 BRPM | WEIGHT: 193 LBS | HEIGHT: 67 IN | SYSTOLIC BLOOD PRESSURE: 130 MMHG | HEART RATE: 71 BPM | BODY MASS INDEX: 30.29 KG/M2 | OXYGEN SATURATION: 100 %

## 2024-12-18 DIAGNOSIS — U07.1 COVID-19: ICD-10-CM

## 2024-12-18 DIAGNOSIS — Z23 ENCOUNTER FOR IMMUNIZATION: ICD-10-CM

## 2024-12-18 DIAGNOSIS — Z95.810 PRESENCE OF AUTOMATIC (IMPLANTABLE) CARDIAC DEFIBRILLATOR: ICD-10-CM

## 2024-12-18 PROCEDURE — 99204 OFFICE O/P NEW MOD 45 MIN: CPT

## 2024-12-18 RX ORDER — ACETAMINOPHEN 325 MG/1
325 TABLET ORAL
Refills: 0 | Status: ACTIVE | COMMUNITY

## 2024-12-20 ENCOUNTER — NON-APPOINTMENT (OUTPATIENT)
Age: 64
End: 2024-12-20

## 2024-12-20 PROCEDURE — 77334 RADIATION TREATMENT AID(S): CPT | Mod: 26

## 2024-12-20 PROCEDURE — 77290 THER RAD SIMULAJ FIELD CPLX: CPT | Mod: 26

## 2024-12-20 PROCEDURE — 77263 THER RADIOLOGY TX PLNG CPLX: CPT

## 2024-12-23 DIAGNOSIS — C79.51 SECONDARY MALIGNANT NEOPLASM OF BONE: ICD-10-CM

## 2024-12-26 ENCOUNTER — APPOINTMENT (OUTPATIENT)
Dept: RADIOLOGY | Facility: CLINIC | Age: 64
End: 2024-12-26
Payer: MEDICARE

## 2024-12-26 PROCEDURE — 71046 X-RAY EXAM CHEST 2 VIEWS: CPT

## 2024-12-30 ENCOUNTER — RESULT REVIEW (OUTPATIENT)
Age: 64
End: 2024-12-30

## 2024-12-30 ENCOUNTER — APPOINTMENT (OUTPATIENT)
Dept: CT IMAGING | Facility: HOSPITAL | Age: 64
End: 2024-12-30
Payer: MEDICARE

## 2024-12-30 ENCOUNTER — OUTPATIENT (OUTPATIENT)
Dept: OUTPATIENT SERVICES | Facility: HOSPITAL | Age: 64
LOS: 1 days | End: 2024-12-30
Payer: MEDICARE

## 2024-12-30 ENCOUNTER — APPOINTMENT (OUTPATIENT)
Dept: MRI IMAGING | Facility: HOSPITAL | Age: 64
End: 2024-12-30
Payer: MEDICARE

## 2024-12-30 DIAGNOSIS — Z90.5 ACQUIRED ABSENCE OF KIDNEY: Chronic | ICD-10-CM

## 2024-12-30 DIAGNOSIS — C64.9 MALIGNANT NEOPLASM OF UNSPECIFIED KIDNEY, EXCEPT RENAL PELVIS: ICD-10-CM

## 2024-12-30 DIAGNOSIS — Z95.810 PRESENCE OF AUTOMATIC (IMPLANTABLE) CARDIAC DEFIBRILLATOR: Chronic | ICD-10-CM

## 2024-12-30 PROCEDURE — 71046 X-RAY EXAM CHEST 2 VIEWS: CPT

## 2024-12-30 PROCEDURE — 71270 CT THORAX DX C-/C+: CPT

## 2024-12-30 PROCEDURE — 71270 CT THORAX DX C-/C+: CPT | Mod: 26

## 2024-12-30 PROCEDURE — 70553 MRI BRAIN STEM W/O & W/DYE: CPT

## 2024-12-30 PROCEDURE — 71046 X-RAY EXAM CHEST 2 VIEWS: CPT | Mod: 26

## 2024-12-30 PROCEDURE — 70553 MRI BRAIN STEM W/O & W/DYE: CPT | Mod: 26

## 2024-12-30 PROCEDURE — A9585: CPT

## 2024-12-31 ENCOUNTER — APPOINTMENT (OUTPATIENT)
Dept: MRI IMAGING | Facility: CLINIC | Age: 64
End: 2024-12-31

## 2025-01-02 ENCOUNTER — APPOINTMENT (OUTPATIENT)
Dept: ENDOCRINOLOGY | Facility: CLINIC | Age: 65
End: 2025-01-02
Payer: MEDICARE

## 2025-01-02 VITALS
SYSTOLIC BLOOD PRESSURE: 134 MMHG | TEMPERATURE: 97.2 F | HEIGHT: 67 IN | HEART RATE: 73 BPM | OXYGEN SATURATION: 98 % | DIASTOLIC BLOOD PRESSURE: 82 MMHG | BODY MASS INDEX: 29.51 KG/M2 | WEIGHT: 188 LBS | RESPIRATION RATE: 16 BRPM

## 2025-01-02 DIAGNOSIS — R76.8 OTHER SPECIFIED ABNORMAL IMMUNOLOGICAL FINDINGS IN SERUM: ICD-10-CM

## 2025-01-02 DIAGNOSIS — E03.9 HYPOTHYROIDISM, UNSPECIFIED: ICD-10-CM

## 2025-01-02 PROCEDURE — G2211 COMPLEX E/M VISIT ADD ON: CPT

## 2025-01-02 PROCEDURE — 99214 OFFICE O/P EST MOD 30 MIN: CPT

## 2025-01-03 ENCOUNTER — OUTPATIENT (OUTPATIENT)
Dept: OUTPATIENT SERVICES | Facility: HOSPITAL | Age: 65
LOS: 1 days | Discharge: HOME | End: 2025-01-03
Payer: MEDICARE

## 2025-01-03 DIAGNOSIS — Z95.810 PRESENCE OF AUTOMATIC (IMPLANTABLE) CARDIAC DEFIBRILLATOR: Chronic | ICD-10-CM

## 2025-01-03 DIAGNOSIS — Z90.5 ACQUIRED ABSENCE OF KIDNEY: Chronic | ICD-10-CM

## 2025-01-03 DIAGNOSIS — C64.9 MALIGNANT NEOPLASM OF UNSPECIFIED KIDNEY, EXCEPT RENAL PELVIS: ICD-10-CM

## 2025-01-03 PROCEDURE — 77307 TELETHX ISODOSE PLAN CPLX: CPT | Mod: 26

## 2025-01-03 PROCEDURE — 77334 RADIATION TREATMENT AID(S): CPT | Mod: 26

## 2025-01-06 ENCOUNTER — RESULT REVIEW (OUTPATIENT)
Age: 65
End: 2025-01-06

## 2025-01-06 ENCOUNTER — APPOINTMENT (OUTPATIENT)
Dept: HEMATOLOGY ONCOLOGY | Facility: CLINIC | Age: 65
End: 2025-01-06
Payer: MEDICARE

## 2025-01-06 ENCOUNTER — APPOINTMENT (OUTPATIENT)
Dept: CT IMAGING | Facility: CLINIC | Age: 65
End: 2025-01-06

## 2025-01-06 ENCOUNTER — OUTPATIENT (OUTPATIENT)
Dept: OUTPATIENT SERVICES | Facility: HOSPITAL | Age: 65
LOS: 1 days | Discharge: HOME | End: 2025-01-06
Payer: MEDICARE

## 2025-01-06 VITALS
HEART RATE: 72 BPM | BODY MASS INDEX: 30.01 KG/M2 | DIASTOLIC BLOOD PRESSURE: 78 MMHG | WEIGHT: 191.58 LBS | TEMPERATURE: 97.5 F | OXYGEN SATURATION: 99 % | RESPIRATION RATE: 16 BRPM | SYSTOLIC BLOOD PRESSURE: 140 MMHG

## 2025-01-06 DIAGNOSIS — Z90.5 ACQUIRED ABSENCE OF KIDNEY: Chronic | ICD-10-CM

## 2025-01-06 DIAGNOSIS — C64.9 MALIGNANT NEOPLASM OF UNSPECIFIED KIDNEY, EXCEPT RENAL PELVIS: ICD-10-CM

## 2025-01-06 DIAGNOSIS — Z95.810 PRESENCE OF AUTOMATIC (IMPLANTABLE) CARDIAC DEFIBRILLATOR: Chronic | ICD-10-CM

## 2025-01-06 LAB
BASOPHILS # BLD AUTO: 0.06 K/UL — SIGNIFICANT CHANGE UP (ref 0–0.2)
BASOPHILS NFR BLD AUTO: 0.8 % — SIGNIFICANT CHANGE UP (ref 0–2)
EOSINOPHIL # BLD AUTO: 0.15 K/UL — SIGNIFICANT CHANGE UP (ref 0–0.5)
EOSINOPHIL NFR BLD AUTO: 2.1 % — SIGNIFICANT CHANGE UP (ref 0–6)
HCT VFR BLD CALC: 45 % — SIGNIFICANT CHANGE UP (ref 39–50)
HGB BLD-MCNC: 15 G/DL — SIGNIFICANT CHANGE UP (ref 13–17)
IMM GRANULOCYTES NFR BLD AUTO: 0.7 % — SIGNIFICANT CHANGE UP (ref 0–0.9)
LYMPHOCYTES # BLD AUTO: 1.6 K/UL — SIGNIFICANT CHANGE UP (ref 1–3.3)
LYMPHOCYTES # BLD AUTO: 22.4 % — SIGNIFICANT CHANGE UP (ref 13–44)
MCHC RBC-ENTMCNC: 28 PG — SIGNIFICANT CHANGE UP (ref 27–34)
MCHC RBC-ENTMCNC: 33.3 G/DL — SIGNIFICANT CHANGE UP (ref 32–36)
MCV RBC AUTO: 84.1 FL — SIGNIFICANT CHANGE UP (ref 80–100)
MONOCYTES # BLD AUTO: 0.55 K/UL — SIGNIFICANT CHANGE UP (ref 0–0.9)
MONOCYTES NFR BLD AUTO: 7.7 % — SIGNIFICANT CHANGE UP (ref 2–14)
NEUTROPHILS # BLD AUTO: 4.73 K/UL — SIGNIFICANT CHANGE UP (ref 1.8–7.4)
NEUTROPHILS NFR BLD AUTO: 66.3 % — SIGNIFICANT CHANGE UP (ref 43–77)
NRBC # BLD: 0 /100 WBCS — SIGNIFICANT CHANGE UP (ref 0–0)
NRBC BLD-RTO: 0 /100 WBCS — SIGNIFICANT CHANGE UP (ref 0–0)
PLATELET # BLD AUTO: 245 K/UL — SIGNIFICANT CHANGE UP (ref 150–400)
RBC # BLD: 5.35 M/UL — SIGNIFICANT CHANGE UP (ref 4.2–5.8)
RBC # FLD: 13.3 % — SIGNIFICANT CHANGE UP (ref 10.3–14.5)
WBC # BLD: 7.14 K/UL — SIGNIFICANT CHANGE UP (ref 3.8–10.5)
WBC # FLD AUTO: 7.14 K/UL — SIGNIFICANT CHANGE UP (ref 3.8–10.5)

## 2025-01-06 PROCEDURE — 93000 ELECTROCARDIOGRAM COMPLETE: CPT

## 2025-01-06 PROCEDURE — 93010 ELECTROCARDIOGRAM REPORT: CPT

## 2025-01-06 PROCEDURE — G2211 COMPLEX E/M VISIT ADD ON: CPT

## 2025-01-06 PROCEDURE — 99215 OFFICE O/P EST HI 40 MIN: CPT

## 2025-01-08 LAB
ALBUMIN SERPL ELPH-MCNC: 4.7 G/DL
ALP BLD-CCNC: 114 U/L
ALT SERPL-CCNC: 15 U/L
ANION GAP SERPL CALC-SCNC: 13 MMOL/L
AST SERPL-CCNC: 17 U/L
BILIRUB SERPL-MCNC: 0.4 MG/DL
BUN SERPL-MCNC: 26 MG/DL
CALCIUM SERPL-MCNC: 10 MG/DL
CHLORIDE SERPL-SCNC: 100 MMOL/L
CO2 SERPL-SCNC: 25 MMOL/L
CREAT SERPL-MCNC: 1.66 MG/DL
EGFR: 46 ML/MIN/1.73M2
GLUCOSE SERPL-MCNC: 92 MG/DL
HBV CORE IGG+IGM SER QL: NONREACTIVE
HBV CORE IGM SER QL: NONREACTIVE
HBV E AB SER QL: NONREACTIVE
HBV E AG SER QL: NONREACTIVE
HBV SURFACE AB SER QL: NONREACTIVE
HBV SURFACE AG SER QL: NONREACTIVE
HCV AB SER QL: NONREACTIVE
HCV S/CO RATIO: 0.07 S/CO
POTASSIUM SERPL-SCNC: 5.3 MMOL/L
PROT SERPL-MCNC: 7.8 G/DL
SODIUM SERPL-SCNC: 137 MMOL/L

## 2025-01-09 ENCOUNTER — NON-APPOINTMENT (OUTPATIENT)
Age: 65
End: 2025-01-09

## 2025-01-09 LAB
ALBUMIN SERPL ELPH-MCNC: 4.6 G/DL
ALP BLD-CCNC: 104 U/L
ALT SERPL-CCNC: 16 U/L
ANION GAP SERPL CALC-SCNC: 14 MMOL/L
AST SERPL-CCNC: 17 U/L
BILIRUB SERPL-MCNC: 0.5 MG/DL
BUN SERPL-MCNC: 24 MG/DL
CALCIUM SERPL-MCNC: 9.7 MG/DL
CHLORIDE SERPL-SCNC: 97 MMOL/L
CO2 SERPL-SCNC: 24 MMOL/L
CREAT SERPL-MCNC: 1.69 MG/DL
EGFR: 45 ML/MIN/1.73M2
GLUCOSE SERPL-MCNC: 83 MG/DL
POTASSIUM SERPL-SCNC: 5 MMOL/L
PROT SERPL-MCNC: 7.4 G/DL
SODIUM SERPL-SCNC: 135 MMOL/L
T3 SERPL-MCNC: 65 NG/DL
T4 FREE SERPL-MCNC: 1.6 NG/DL
TSH SERPL-ACNC: 1.71 UIU/ML

## 2025-01-10 NOTE — PACU DISCHARGE NOTE - PAIN:
Nurse returned call to patient wife. Patient is currently in-patient. Wife-Janessa was unable to contact. Nurse left voicemail to contact office.  
Pt wife Janessa called would like to know if the CT scan was done and has some other question Please c/b @ [phone] Thank you  
Controlled with current regime

## 2025-01-11 ENCOUNTER — APPOINTMENT (OUTPATIENT)
Dept: HEMATOLOGY ONCOLOGY | Facility: CLINIC | Age: 65
End: 2025-01-11

## 2025-01-11 PROCEDURE — 77280 THER RAD SIMULAJ FIELD SMPL: CPT | Mod: 26

## 2025-01-13 PROCEDURE — 77427 RADIATION TX MANAGEMENT X5: CPT

## 2025-01-14 ENCOUNTER — APPOINTMENT (OUTPATIENT)
Dept: RADIATION ONCOLOGY | Facility: CLINIC | Age: 65
End: 2025-01-14
Payer: MEDICARE

## 2025-01-14 VITALS
RESPIRATION RATE: 16 BRPM | OXYGEN SATURATION: 97 % | BODY MASS INDEX: 14.33 KG/M2 | DIASTOLIC BLOOD PRESSURE: 79 MMHG | HEIGHT: 67 IN | HEART RATE: 62 BPM | WEIGHT: 91.31 LBS | SYSTOLIC BLOOD PRESSURE: 148 MMHG

## 2025-01-14 DIAGNOSIS — G93.89 OTHER SPECIFIED DISORDERS OF BRAIN: ICD-10-CM

## 2025-01-14 DIAGNOSIS — C79.31 SECONDARY MALIGNANT NEOPLASM OF BRAIN: ICD-10-CM

## 2025-01-14 PROBLEM — Z92.3 HISTORY OF RADIATION THERAPY: Status: RESOLVED | Noted: 2025-01-14 | Resolved: 2025-01-14

## 2025-01-14 PROCEDURE — 99214 OFFICE O/P EST MOD 30 MIN: CPT

## 2025-01-14 RX ORDER — DEXAMETHASONE 4 MG/1
4 TABLET ORAL
Qty: 5 | Refills: 0 | Status: ACTIVE | COMMUNITY
Start: 2025-01-14 | End: 1900-01-01

## 2025-01-15 DIAGNOSIS — C79.31 SECONDARY MALIGNANT NEOPLASM OF BRAIN: ICD-10-CM

## 2025-01-16 ENCOUNTER — NON-APPOINTMENT (OUTPATIENT)
Age: 65
End: 2025-01-16

## 2025-01-16 DIAGNOSIS — Z92.89 PERSONAL HISTORY OF OTHER MEDICAL TREATMENT: ICD-10-CM

## 2025-01-16 DIAGNOSIS — Z92.3 PERSONAL HISTORY OF IRRADIATION: ICD-10-CM

## 2025-01-16 RX ORDER — OXYCODONE 5 MG/1
5 TABLET ORAL
Qty: 20 | Refills: 0 | Status: ACTIVE | COMMUNITY
Start: 2025-01-16 | End: 1900-01-01

## 2025-01-21 ENCOUNTER — APPOINTMENT (OUTPATIENT)
Dept: MRI IMAGING | Facility: HOSPITAL | Age: 65
End: 2025-01-21
Payer: MEDICARE

## 2025-01-21 ENCOUNTER — OUTPATIENT (OUTPATIENT)
Dept: OUTPATIENT SERVICES | Facility: HOSPITAL | Age: 65
LOS: 1 days | End: 2025-01-21
Payer: MEDICARE

## 2025-01-21 DIAGNOSIS — Z90.5 ACQUIRED ABSENCE OF KIDNEY: Chronic | ICD-10-CM

## 2025-01-21 DIAGNOSIS — Z95.810 PRESENCE OF AUTOMATIC (IMPLANTABLE) CARDIAC DEFIBRILLATOR: Chronic | ICD-10-CM

## 2025-01-21 DIAGNOSIS — Z95.810 PRESENCE OF AUTOMATIC (IMPLANTABLE) CARDIAC DEFIBRILLATOR: ICD-10-CM

## 2025-01-21 DIAGNOSIS — G93.89 OTHER SPECIFIED DISORDERS OF BRAIN: ICD-10-CM

## 2025-01-21 PROCEDURE — 70553 MRI BRAIN STEM W/O & W/DYE: CPT

## 2025-01-21 PROCEDURE — A9585: CPT

## 2025-01-21 PROCEDURE — 71046 X-RAY EXAM CHEST 2 VIEWS: CPT

## 2025-01-21 PROCEDURE — 70553 MRI BRAIN STEM W/O & W/DYE: CPT | Mod: 26

## 2025-01-21 PROCEDURE — 71046 X-RAY EXAM CHEST 2 VIEWS: CPT | Mod: 26

## 2025-01-24 ENCOUNTER — APPOINTMENT (OUTPATIENT)
Dept: NEUROSURGERY | Facility: CLINIC | Age: 65
End: 2025-01-24
Payer: MEDICARE

## 2025-01-24 VITALS
BODY MASS INDEX: 29.76 KG/M2 | RESPIRATION RATE: 18 BRPM | OXYGEN SATURATION: 97 % | DIASTOLIC BLOOD PRESSURE: 71 MMHG | WEIGHT: 190 LBS | TEMPERATURE: 97.9 F | HEART RATE: 74 BPM | SYSTOLIC BLOOD PRESSURE: 115 MMHG

## 2025-01-24 PROCEDURE — 99204 OFFICE O/P NEW MOD 45 MIN: CPT

## 2025-01-28 ENCOUNTER — APPOINTMENT (OUTPATIENT)
Dept: NEUROSURGERY | Facility: HOSPITAL | Age: 65
End: 2025-01-28
Payer: MEDICARE

## 2025-01-28 PROCEDURE — 61797 SRS CRAN LES SIMPLE ADDL: CPT

## 2025-01-28 PROCEDURE — 77432 STEREOTACTIC RADIATION TRMT: CPT

## 2025-01-28 PROCEDURE — 77300 RADIATION THERAPY DOSE PLAN: CPT | Mod: 26

## 2025-01-28 PROCEDURE — 77334 RADIATION TREATMENT AID(S): CPT | Mod: 26

## 2025-01-28 PROCEDURE — 61796 SRS CRANIAL LESION SIMPLE: CPT

## 2025-01-28 PROCEDURE — 77263 THER RADIOLOGY TX PLNG CPLX: CPT

## 2025-01-28 PROCEDURE — 77295 3-D RADIOTHERAPY PLAN: CPT | Mod: 26

## 2025-02-07 ENCOUNTER — NON-APPOINTMENT (OUTPATIENT)
Age: 65
End: 2025-02-07

## 2025-02-10 ENCOUNTER — RESULT REVIEW (OUTPATIENT)
Age: 65
End: 2025-02-10

## 2025-02-10 ENCOUNTER — APPOINTMENT (OUTPATIENT)
Dept: HEMATOLOGY ONCOLOGY | Facility: CLINIC | Age: 65
End: 2025-02-10
Payer: MEDICARE

## 2025-02-10 VITALS
WEIGHT: 189 LBS | RESPIRATION RATE: 18 BRPM | BODY MASS INDEX: 29.66 KG/M2 | HEIGHT: 67 IN | OXYGEN SATURATION: 98 % | DIASTOLIC BLOOD PRESSURE: 81 MMHG | HEART RATE: 74 BPM | SYSTOLIC BLOOD PRESSURE: 123 MMHG | TEMPERATURE: 97.1 F

## 2025-02-10 DIAGNOSIS — C64.9 MALIGNANT NEOPLASM OF UNSPECIFIED KIDNEY, EXCEPT RENAL PELVIS: ICD-10-CM

## 2025-02-10 LAB
BASOPHILS # BLD AUTO: 0.04 K/UL — SIGNIFICANT CHANGE UP (ref 0–0.2)
BASOPHILS NFR BLD AUTO: 0.7 % — SIGNIFICANT CHANGE UP (ref 0–2)
EOSINOPHIL # BLD AUTO: 0.13 K/UL — SIGNIFICANT CHANGE UP (ref 0–0.5)
EOSINOPHIL NFR BLD AUTO: 2.3 % — SIGNIFICANT CHANGE UP (ref 0–6)
HCT VFR BLD CALC: 42.4 % — SIGNIFICANT CHANGE UP (ref 39–50)
HGB BLD-MCNC: 13.7 G/DL — SIGNIFICANT CHANGE UP (ref 13–17)
IMM GRANULOCYTES NFR BLD AUTO: 0.2 % — SIGNIFICANT CHANGE UP (ref 0–0.9)
LYMPHOCYTES # BLD AUTO: 1.3 K/UL — SIGNIFICANT CHANGE UP (ref 1–3.3)
LYMPHOCYTES # BLD AUTO: 22.6 % — SIGNIFICANT CHANGE UP (ref 13–44)
MCHC RBC-ENTMCNC: 27.2 PG — SIGNIFICANT CHANGE UP (ref 27–34)
MCHC RBC-ENTMCNC: 32.3 G/DL — SIGNIFICANT CHANGE UP (ref 32–36)
MCV RBC AUTO: 84.1 FL — SIGNIFICANT CHANGE UP (ref 80–100)
MONOCYTES # BLD AUTO: 0.74 K/UL — SIGNIFICANT CHANGE UP (ref 0–0.9)
MONOCYTES NFR BLD AUTO: 12.9 % — SIGNIFICANT CHANGE UP (ref 2–14)
NEUTROPHILS # BLD AUTO: 3.53 K/UL — SIGNIFICANT CHANGE UP (ref 1.8–7.4)
NEUTROPHILS NFR BLD AUTO: 61.3 % — SIGNIFICANT CHANGE UP (ref 43–77)
NRBC # BLD: 0 /100 WBCS — SIGNIFICANT CHANGE UP (ref 0–0)
NRBC BLD-RTO: 0 /100 WBCS — SIGNIFICANT CHANGE UP (ref 0–0)
PLATELET # BLD AUTO: 216 K/UL — SIGNIFICANT CHANGE UP (ref 150–400)
RBC # BLD: 5.04 M/UL — SIGNIFICANT CHANGE UP (ref 4.2–5.8)
RBC # FLD: 13.3 % — SIGNIFICANT CHANGE UP (ref 10.3–14.5)
WBC # BLD: 5.75 K/UL — SIGNIFICANT CHANGE UP (ref 3.8–10.5)
WBC # FLD AUTO: 5.75 K/UL — SIGNIFICANT CHANGE UP (ref 3.8–10.5)

## 2025-02-10 PROCEDURE — 99214 OFFICE O/P EST MOD 30 MIN: CPT

## 2025-02-10 RX ORDER — LENVATINIB 14 MG/DAY
10 & 4 KIT ORAL
Qty: 60 | Refills: 5 | Status: ACTIVE | COMMUNITY
Start: 2025-02-10 | End: 1900-01-01

## 2025-02-13 ENCOUNTER — RESULT REVIEW (OUTPATIENT)
Age: 65
End: 2025-02-13

## 2025-02-13 ENCOUNTER — APPOINTMENT (OUTPATIENT)
Dept: INFUSION THERAPY | Facility: HOSPITAL | Age: 65
End: 2025-02-13

## 2025-02-13 LAB
ALBUMIN SERPL ELPH-MCNC: 4.1 G/DL — SIGNIFICANT CHANGE UP (ref 3.3–5)
ALBUMIN SERPL ELPH-MCNC: 4.4 G/DL
ALP BLD-CCNC: 117 U/L
ALP SERPL-CCNC: 104 U/L — SIGNIFICANT CHANGE UP (ref 40–120)
ALT FLD-CCNC: 20 U/L — SIGNIFICANT CHANGE UP (ref 10–45)
ALT SERPL-CCNC: 20 U/L
ANION GAP SERPL CALC-SCNC: 10 MMOL/L
ANION GAP SERPL CALC-SCNC: 15 MMOL/L — SIGNIFICANT CHANGE UP (ref 5–17)
AST SERPL-CCNC: 17 U/L
AST SERPL-CCNC: 22 U/L — SIGNIFICANT CHANGE UP (ref 10–40)
BASOPHILS # BLD AUTO: 0.03 K/UL — SIGNIFICANT CHANGE UP (ref 0–0.2)
BASOPHILS NFR BLD AUTO: 0.5 % — SIGNIFICANT CHANGE UP (ref 0–2)
BILIRUB SERPL-MCNC: 0.3 MG/DL
BILIRUB SERPL-MCNC: 0.4 MG/DL — SIGNIFICANT CHANGE UP (ref 0.2–1.2)
BUN SERPL-MCNC: 19 MG/DL — SIGNIFICANT CHANGE UP (ref 7–23)
BUN SERPL-MCNC: 23 MG/DL
CALCIUM SERPL-MCNC: 10 MG/DL — SIGNIFICANT CHANGE UP (ref 8.4–10.5)
CALCIUM SERPL-MCNC: 9.7 MG/DL
CHLORIDE SERPL-SCNC: 101 MMOL/L
CHLORIDE SERPL-SCNC: 101 MMOL/L — SIGNIFICANT CHANGE UP (ref 96–108)
CO2 SERPL-SCNC: 22 MMOL/L — SIGNIFICANT CHANGE UP (ref 22–31)
CO2 SERPL-SCNC: 26 MMOL/L
CREAT SERPL-MCNC: 1.43 MG/DL — HIGH (ref 0.5–1.3)
CREAT SERPL-MCNC: 1.76 MG/DL
EGFR: 43 ML/MIN/1.73M2
EGFR: 55 ML/MIN/1.73M2 — LOW
EGFR: 55 ML/MIN/1.73M2 — LOW
EOSINOPHIL # BLD AUTO: 0.11 K/UL — SIGNIFICANT CHANGE UP (ref 0–0.5)
EOSINOPHIL NFR BLD AUTO: 2 % — SIGNIFICANT CHANGE UP (ref 0–6)
GLUCOSE SERPL-MCNC: 91 MG/DL — SIGNIFICANT CHANGE UP (ref 70–99)
GLUCOSE SERPL-MCNC: 98 MG/DL
HCT VFR BLD CALC: 42.3 % — SIGNIFICANT CHANGE UP (ref 39–50)
HGB BLD-MCNC: 13.9 G/DL — SIGNIFICANT CHANGE UP (ref 13–17)
IMM GRANULOCYTES NFR BLD AUTO: 0.4 % — SIGNIFICANT CHANGE UP (ref 0–0.9)
LYMPHOCYTES # BLD AUTO: 1.09 K/UL — SIGNIFICANT CHANGE UP (ref 1–3.3)
LYMPHOCYTES # BLD AUTO: 19.9 % — SIGNIFICANT CHANGE UP (ref 13–44)
MCHC RBC-ENTMCNC: 27.1 PG — SIGNIFICANT CHANGE UP (ref 27–34)
MCHC RBC-ENTMCNC: 32.9 G/DL — SIGNIFICANT CHANGE UP (ref 32–36)
MCV RBC AUTO: 82.6 FL — SIGNIFICANT CHANGE UP (ref 80–100)
MONOCYTES # BLD AUTO: 0.63 K/UL — SIGNIFICANT CHANGE UP (ref 0–0.9)
MONOCYTES NFR BLD AUTO: 11.5 % — SIGNIFICANT CHANGE UP (ref 2–14)
NEUTROPHILS # BLD AUTO: 3.61 K/UL — SIGNIFICANT CHANGE UP (ref 1.8–7.4)
NEUTROPHILS NFR BLD AUTO: 65.7 % — SIGNIFICANT CHANGE UP (ref 43–77)
NRBC BLD AUTO-RTO: 0 /100 WBCS — SIGNIFICANT CHANGE UP (ref 0–0)
PLATELET # BLD AUTO: 225 K/UL — SIGNIFICANT CHANGE UP (ref 150–400)
POTASSIUM SERPL-MCNC: 5.1 MMOL/L — SIGNIFICANT CHANGE UP (ref 3.5–5.3)
POTASSIUM SERPL-SCNC: 5.1 MMOL/L
POTASSIUM SERPL-SCNC: 5.1 MMOL/L — SIGNIFICANT CHANGE UP (ref 3.5–5.3)
PROT SERPL-MCNC: 7.1 G/DL
PROT SERPL-MCNC: 7.6 G/DL — SIGNIFICANT CHANGE UP (ref 6–8.3)
RBC # BLD: 5.12 M/UL — SIGNIFICANT CHANGE UP (ref 4.2–5.8)
RBC # FLD: 13.3 % — SIGNIFICANT CHANGE UP (ref 10.3–14.5)
SODIUM SERPL-SCNC: 137 MMOL/L
SODIUM SERPL-SCNC: 139 MMOL/L — SIGNIFICANT CHANGE UP (ref 135–145)
T4 FREE SERPL-MCNC: 1.8 NG/DL — HIGH (ref 0.9–1.7)
TSH SERPL-MCNC: 0.34 UIU/ML — SIGNIFICANT CHANGE UP (ref 0.27–4.2)
WBC # BLD: 5.49 K/UL — SIGNIFICANT CHANGE UP (ref 3.8–10.5)
WBC # FLD AUTO: 5.49 K/UL — SIGNIFICANT CHANGE UP (ref 3.8–10.5)

## 2025-02-14 ENCOUNTER — NON-APPOINTMENT (OUTPATIENT)
Age: 65
End: 2025-02-14

## 2025-02-14 DIAGNOSIS — Z51.11 ENCOUNTER FOR ANTINEOPLASTIC CHEMOTHERAPY: ICD-10-CM

## 2025-02-18 ENCOUNTER — EMERGENCY (EMERGENCY)
Facility: HOSPITAL | Age: 65
LOS: 1 days | Discharge: HOME | End: 2025-02-18
Attending: EMERGENCY MEDICINE | Admitting: EMERGENCY MEDICINE
Payer: MEDICARE

## 2025-02-18 ENCOUNTER — NON-APPOINTMENT (OUTPATIENT)
Age: 65
End: 2025-02-18

## 2025-02-18 VITALS
SYSTOLIC BLOOD PRESSURE: 133 MMHG | OXYGEN SATURATION: 100 % | DIASTOLIC BLOOD PRESSURE: 84 MMHG | RESPIRATION RATE: 16 BRPM | HEART RATE: 77 BPM

## 2025-02-18 VITALS
TEMPERATURE: 98 F | DIASTOLIC BLOOD PRESSURE: 83 MMHG | HEIGHT: 66.93 IN | RESPIRATION RATE: 18 BRPM | HEART RATE: 76 BPM | OXYGEN SATURATION: 97 % | WEIGHT: 188.05 LBS | SYSTOLIC BLOOD PRESSURE: 132 MMHG

## 2025-02-18 DIAGNOSIS — Z90.5 ACQUIRED ABSENCE OF KIDNEY: Chronic | ICD-10-CM

## 2025-02-18 DIAGNOSIS — Z95.810 PRESENCE OF AUTOMATIC (IMPLANTABLE) CARDIAC DEFIBRILLATOR: Chronic | ICD-10-CM

## 2025-02-18 LAB
ALBUMIN SERPL ELPH-MCNC: 4 G/DL — SIGNIFICANT CHANGE UP (ref 3.3–5)
ALP SERPL-CCNC: 111 U/L — SIGNIFICANT CHANGE UP (ref 40–120)
ALT FLD-CCNC: 16 U/L — SIGNIFICANT CHANGE UP (ref 4–41)
ANION GAP SERPL CALC-SCNC: 11 MMOL/L — SIGNIFICANT CHANGE UP (ref 7–14)
APTT BLD: 42.6 SEC — HIGH (ref 24.5–35.6)
AST SERPL-CCNC: 24 U/L — SIGNIFICANT CHANGE UP (ref 4–40)
BASOPHILS # BLD AUTO: 0.04 K/UL — SIGNIFICANT CHANGE UP (ref 0–0.2)
BASOPHILS NFR BLD AUTO: 0.7 % — SIGNIFICANT CHANGE UP (ref 0–2)
BILIRUB SERPL-MCNC: 0.4 MG/DL — SIGNIFICANT CHANGE UP (ref 0.2–1.2)
BUN SERPL-MCNC: 17 MG/DL — SIGNIFICANT CHANGE UP (ref 7–23)
CA-I BLD-SCNC: 0.99 MMOL/L — LOW (ref 1.15–1.29)
CALCIUM SERPL-MCNC: 9 MG/DL — SIGNIFICANT CHANGE UP (ref 8.4–10.5)
CHLORIDE SERPL-SCNC: 98 MMOL/L — SIGNIFICANT CHANGE UP (ref 98–107)
CO2 SERPL-SCNC: 21 MMOL/L — LOW (ref 22–31)
CREAT SERPL-MCNC: 1.45 MG/DL — HIGH (ref 0.5–1.3)
EGFR: 54 ML/MIN/1.73M2 — LOW
EOSINOPHIL # BLD AUTO: 0.11 K/UL — SIGNIFICANT CHANGE UP (ref 0–0.5)
EOSINOPHIL NFR BLD AUTO: 1.9 % — SIGNIFICANT CHANGE UP (ref 0–6)
GLUCOSE SERPL-MCNC: 107 MG/DL — HIGH (ref 70–99)
HCT VFR BLD CALC: 43.6 % — SIGNIFICANT CHANGE UP (ref 39–50)
HGB BLD-MCNC: 14.5 G/DL — SIGNIFICANT CHANGE UP (ref 13–17)
IANC: 3.71 K/UL — SIGNIFICANT CHANGE UP (ref 1.8–7.4)
IMM GRANULOCYTES NFR BLD AUTO: 0.5 % — SIGNIFICANT CHANGE UP (ref 0–0.9)
INR BLD: 1.12 RATIO — SIGNIFICANT CHANGE UP (ref 0.85–1.16)
LYMPHOCYTES # BLD AUTO: 1.25 K/UL — SIGNIFICANT CHANGE UP (ref 1–3.3)
LYMPHOCYTES # BLD AUTO: 21.7 % — SIGNIFICANT CHANGE UP (ref 13–44)
MAGNESIUM SERPL-MCNC: 2 MG/DL — SIGNIFICANT CHANGE UP (ref 1.6–2.6)
MCHC RBC-ENTMCNC: 27.1 PG — SIGNIFICANT CHANGE UP (ref 27–34)
MCHC RBC-ENTMCNC: 33.3 G/DL — SIGNIFICANT CHANGE UP (ref 32–36)
MCV RBC AUTO: 81.5 FL — SIGNIFICANT CHANGE UP (ref 80–100)
MONOCYTES # BLD AUTO: 0.63 K/UL — SIGNIFICANT CHANGE UP (ref 0–0.9)
MONOCYTES NFR BLD AUTO: 10.9 % — SIGNIFICANT CHANGE UP (ref 2–14)
NEUTROPHILS # BLD AUTO: 3.71 K/UL — SIGNIFICANT CHANGE UP (ref 1.8–7.4)
NEUTROPHILS NFR BLD AUTO: 64.3 % — SIGNIFICANT CHANGE UP (ref 43–77)
NRBC # BLD AUTO: 0 K/UL — SIGNIFICANT CHANGE UP (ref 0–0)
NRBC # FLD: 0 K/UL — SIGNIFICANT CHANGE UP (ref 0–0)
NRBC BLD AUTO-RTO: 0 /100 WBCS — SIGNIFICANT CHANGE UP (ref 0–0)
PLATELET # BLD AUTO: 242 K/UL — SIGNIFICANT CHANGE UP (ref 150–400)
POTASSIUM SERPL-MCNC: 4.9 MMOL/L — SIGNIFICANT CHANGE UP (ref 3.5–5.3)
POTASSIUM SERPL-SCNC: 4.9 MMOL/L — SIGNIFICANT CHANGE UP (ref 3.5–5.3)
PROT SERPL-MCNC: 7.3 G/DL — SIGNIFICANT CHANGE UP (ref 6–8.3)
PROTHROM AB SERPL-ACNC: 13 SEC — SIGNIFICANT CHANGE UP (ref 9.9–13.4)
RBC # BLD: 5.35 M/UL — SIGNIFICANT CHANGE UP (ref 4.2–5.8)
RBC # FLD: 13.1 % — SIGNIFICANT CHANGE UP (ref 10.3–14.5)
SODIUM SERPL-SCNC: 130 MMOL/L — LOW (ref 135–145)
WBC # BLD: 5.77 K/UL — SIGNIFICANT CHANGE UP (ref 3.8–10.5)
WBC # FLD AUTO: 5.77 K/UL — SIGNIFICANT CHANGE UP (ref 3.8–10.5)

## 2025-02-18 PROCEDURE — 99285 EMERGENCY DEPT VISIT HI MDM: CPT

## 2025-02-18 PROCEDURE — 71250 CT THORAX DX C-: CPT | Mod: 26

## 2025-02-18 RX ORDER — LIDOCAINE HYDROCHLORIDE 30 MG/G
1 CREAM TOPICAL
Qty: 1 | Refills: 0
Start: 2025-02-18 | End: 2025-02-27

## 2025-02-18 RX ORDER — ACETAMINOPHEN 160 MG/5ML
1000 SUSPENSION ORAL ONCE
Refills: 0 | Status: COMPLETED | OUTPATIENT
Start: 2025-02-18 | End: 2025-02-18

## 2025-02-18 RX ORDER — OXYCODONE HYDROCHLORIDE 30 MG/1
1 TABLET ORAL
Qty: 3 | Refills: 0
Start: 2025-02-18 | End: 2025-02-20

## 2025-02-18 RX ORDER — ACETAMINOPHEN 160 MG/5ML
2 SUSPENSION ORAL
Qty: 80 | Refills: 0
Start: 2025-02-18 | End: 2025-02-27

## 2025-02-18 RX ADMIN — ACETAMINOPHEN 1000 MILLIGRAM(S): 160 SUSPENSION ORAL at 11:53

## 2025-02-18 RX ADMIN — ACETAMINOPHEN 400 MILLIGRAM(S): 160 SUSPENSION ORAL at 11:23

## 2025-02-18 NOTE — ED PROVIDER NOTE - ATTENDING CONTRIBUTION TO CARE
Dr. De Leon: 63 yo male with ischemic heart disease and AICD, HTN, hypothyroidism, HLD, CKD, metastatic renal cell s/p nephrectomy and with known right 7th rib pathologic implant treated with radiation, in ED with pain to exact same spot on right back where he required radiation last time.  Pt endorses "deep" pain but no CP/SOB or other complaints.  Vital signs reviewed, no tachycardia or hypoxia noted.  On exam pt overall well appearing, in NAD, heart RRR, lungs CTAB, abd NTND, extremities without swelling, strength 5/5 in all extremities and skin without rash.  There is a discreet area of right thoracic paraspinal back with TTP, roughly overlying 7th rib, but no erythema or ecchymosis or swelling to the area.    I, Jhonatan De Leon MD, personally made/approved the management plan for this patient and take responsibility for the patient's management.

## 2025-02-18 NOTE — ED PROVIDER NOTE - TEST CONSIDERED BUT NOT PERFORMED
considered PE study, but pt without tachycardia or hypoxia to suggest significant PE, and pt has had pain at exact same location, previously found to be due to malignancy requiring radiation treatment Tests Considered But Not Performed

## 2025-02-18 NOTE — ED ADULT NURSE NOTE - OBJECTIVE STATEMENT
A&Ox4. ambulatory. C/O right side ABD pain. NAD. pt denies SOB, chest pain, dizziness, weakness, urinary symptoms, HA, n/v/d, fevers, chills. respirations are even and un labored. BAD is soft with positive right upper and lower quad ABD pain upon palpations. skin intact, IV placed. safety precautions maintained. A&Ox4. ambulatory. C/O right side ABD pain for 4 days.. NAD. HX of left kidney CA with mets. PT speaks Turkmen. niece at bedside translating. PT refuses translation services. pt denies SOB, chest pain, dizziness, weakness, urinary symptoms, HA, n/v/d, fevers, chills. respirations are even and un labored. ABD is soft with positive right upper and lower quad ABD pain upon palpations. PT states BMs are normal in color and frequency. skin intact, IV placed. safety precautions maintained. A&Ox4. ambulatory. C/O right side ABD pain for 4 days.. NAD. HX of left kidney CA with mets. PT speaks Divehi. niece at bedside translating. PT refuses translation services. pt denies SOB, chest pain, dizziness, weakness, urinary symptoms, HA, n/v/d, fevers, chills. respirations are even and un labored. ABD is soft with positive right upper and lower quad ABD pain upon palpations. PT states BMs are normal in color and frequency. skin intact, 20g IV placed to RAC. safety precautions maintained. A&Ox4. ambulatory. C/O right side ABD pain for 4 days.. NAD. HX of left kidney CA with mets. PT speaks Frisian. niece at bedside translating. PT refuses translation services. pt denies SOB, chest pain, dizziness, weakness, urinary symptoms, HA, n/v/d, fevers, chills, trauma, recent travel. respirations are even and un labored. ABD is soft with positive right upper and lower quad ABD pain upon palpations. PT states BMs are normal in color and frequency. skin intact, 20g IV placed to RAC. safety precautions maintained.

## 2025-02-18 NOTE — ED PROVIDER NOTE - DIFFERENTIAL DIAGNOSIS
Differential Diagnosis pathologic fracture, malignancy issue, pleural effusion, pneumonia, low suspicion for PE or pneumothorax

## 2025-02-18 NOTE — ED PROVIDER NOTE - NSFOLLOWUPINSTRUCTIONS_ED_ALL_ED_FT
You presented to the ER complaining of right-sided posterior chest wall pain.  You are known to have metastatic renal cell cancer, your CT scan showed that you have stable right posterior seventh rib metastatic deposit and stable lung nodules.  Findings have been communicated to you, her pain has responded to intravenous pain medications.  You will be discharged on pain medications to use at home.  Please follow-up with your radiation doctor on February 26.

## 2025-02-18 NOTE — ED PROVIDER NOTE - PHYSICAL EXAMINATION
PHYSICAL EXAM:  GENERAL: NAD, lying in bed comfortably  HEAD:  Atraumatic, Normocephalic  EYES: EOMI, PERRLA, conjunctiva and sclera clear  ENT: No erythema/pallor/petechiae/lesion  NECK: Supple, No JVD  LUNG: CTA b/l; no r/r/w  HEART: RRR, +S1/S2; No m/r/g  ABDOMEN: soft, NT/ND; BS audible   EXTREMITIES:  2+ Peripheral Pulses, brisk cap refill. No clubbing, cyanosis, or edema  NERVOUS SYSTEM:  AAOx3, speech clear. No sensory/motor deficits   MSK: ttp right post chest wall - no deformity visible/palpable    SKIN: No rashes or lesions

## 2025-02-18 NOTE — ED PROVIDER NOTE - OBJECTIVE STATEMENT
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  Patient reports pain similar to prior presentation which required radiation.  No fevers, URI symptoms, cough, difficulty in breathing, chest pains, changes in appetite, vomiting, abdominal pain, urinary/bowel complaints.  Currently on Tylenol, last dose was last night.  Reports no allergies to medications.

## 2025-02-18 NOTE — ED PROVIDER NOTE - PROGRESS NOTE DETAILS
Shayy JANSEN: Patient updated about lab/imaging results, plan to discharge on pain medications.  Patient has to follow-up with radiation doctor on Feb 26.  Reinforced for follow-up.  All questions answered.

## 2025-02-18 NOTE — ED PROVIDER NOTE - PATIENT PORTAL LINK FT
You can access the FollowMyHealth Patient Portal offered by [facility] by registering at the following website: [email or web address]. By joining [facility]’s FollowMyHealth portal, you will also be able to view your health information using other applications (apps) compatible with our system.

## 2025-02-18 NOTE — ED ADULT TRIAGE NOTE - CHIEF COMPLAINT QUOTE
Pt presents to ED ambulatory from home with c/o pain to R side x several weeks. Pt reports hx of colon CA on chemo last treatment 1 week ago. pt reports hx of metastasis to ribs 4 months ago. Pt was advised by oncologist to come to ED for further eval.

## 2025-02-18 NOTE — ED PROVIDER NOTE - CLINICAL SUMMARY MEDICAL DECISION MAKING FREE TEXT BOX
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 VSS. PE ttp right post chest wall - no deformity visible/palpable . Plan  to do labs/imaging - CT for eval rib deposit. Shall give pain control.

## 2025-02-19 ENCOUNTER — APPOINTMENT (OUTPATIENT)
Dept: HOME HEALTH SERVICES | Facility: HOME HEALTH | Age: 65
End: 2025-02-19

## 2025-02-24 ENCOUNTER — NON-APPOINTMENT (OUTPATIENT)
Age: 65
End: 2025-02-24

## 2025-02-26 ENCOUNTER — APPOINTMENT (OUTPATIENT)
Dept: RADIATION ONCOLOGY | Facility: CLINIC | Age: 65
End: 2025-02-26
Payer: MEDICARE

## 2025-02-26 VITALS
OXYGEN SATURATION: 98 % | DIASTOLIC BLOOD PRESSURE: 85 MMHG | BODY MASS INDEX: 28.84 KG/M2 | HEART RATE: 69 BPM | HEIGHT: 67 IN | RESPIRATION RATE: 17 BRPM | WEIGHT: 183.75 LBS | TEMPERATURE: 97.3 F | SYSTOLIC BLOOD PRESSURE: 134 MMHG

## 2025-02-26 DIAGNOSIS — Z92.3 PERSONAL HISTORY OF IRRADIATION: ICD-10-CM

## 2025-02-26 PROCEDURE — 99024 POSTOP FOLLOW-UP VISIT: CPT

## 2025-03-03 ENCOUNTER — RESULT REVIEW (OUTPATIENT)
Age: 65
End: 2025-03-03

## 2025-03-03 ENCOUNTER — APPOINTMENT (OUTPATIENT)
Dept: HEMATOLOGY ONCOLOGY | Facility: CLINIC | Age: 65
End: 2025-03-03

## 2025-03-03 LAB
BASOPHILS # BLD AUTO: 0.02 K/UL — SIGNIFICANT CHANGE UP (ref 0–0.2)
BASOPHILS NFR BLD AUTO: 0.4 % — SIGNIFICANT CHANGE UP (ref 0–2)
EOSINOPHIL # BLD AUTO: 0.18 K/UL — SIGNIFICANT CHANGE UP (ref 0–0.5)
EOSINOPHIL NFR BLD AUTO: 3.4 % — SIGNIFICANT CHANGE UP (ref 0–6)
HCT VFR BLD CALC: 45.5 % — SIGNIFICANT CHANGE UP (ref 39–50)
HGB BLD-MCNC: 15.3 G/DL — SIGNIFICANT CHANGE UP (ref 13–17)
IMM GRANULOCYTES NFR BLD AUTO: 0.4 % — SIGNIFICANT CHANGE UP (ref 0–0.9)
LYMPHOCYTES # BLD AUTO: 1.28 K/UL — SIGNIFICANT CHANGE UP (ref 1–3.3)
LYMPHOCYTES # BLD AUTO: 24.4 % — SIGNIFICANT CHANGE UP (ref 13–44)
MCHC RBC-ENTMCNC: 27.3 PG — SIGNIFICANT CHANGE UP (ref 27–34)
MCHC RBC-ENTMCNC: 33.6 G/DL — SIGNIFICANT CHANGE UP (ref 32–36)
MCV RBC AUTO: 81.1 FL — SIGNIFICANT CHANGE UP (ref 80–100)
MONOCYTES # BLD AUTO: 0.37 K/UL — SIGNIFICANT CHANGE UP (ref 0–0.9)
MONOCYTES NFR BLD AUTO: 7 % — SIGNIFICANT CHANGE UP (ref 2–14)
NEUTROPHILS # BLD AUTO: 3.38 K/UL — SIGNIFICANT CHANGE UP (ref 1.8–7.4)
NEUTROPHILS NFR BLD AUTO: 64.4 % — SIGNIFICANT CHANGE UP (ref 43–77)
NRBC BLD AUTO-RTO: 0 /100 WBCS — SIGNIFICANT CHANGE UP (ref 0–0)
PLATELET # BLD AUTO: 173 K/UL — SIGNIFICANT CHANGE UP (ref 150–400)
RBC # BLD: 5.61 M/UL — SIGNIFICANT CHANGE UP (ref 4.2–5.8)
RBC # FLD: 13.2 % — SIGNIFICANT CHANGE UP (ref 10.3–14.5)
WBC # BLD: 5.25 K/UL — SIGNIFICANT CHANGE UP (ref 3.8–10.5)
WBC # FLD AUTO: 5.25 K/UL — SIGNIFICANT CHANGE UP (ref 3.8–10.5)

## 2025-03-04 ENCOUNTER — APPOINTMENT (OUTPATIENT)
Dept: INFUSION THERAPY | Facility: HOSPITAL | Age: 65
End: 2025-03-04

## 2025-03-04 ENCOUNTER — RESULT REVIEW (OUTPATIENT)
Age: 65
End: 2025-03-04

## 2025-03-04 ENCOUNTER — APPOINTMENT (OUTPATIENT)
Dept: HEMATOLOGY ONCOLOGY | Facility: CLINIC | Age: 65
End: 2025-03-04
Payer: MEDICARE

## 2025-03-04 VITALS
BODY MASS INDEX: 28.61 KG/M2 | RESPIRATION RATE: 16 BRPM | TEMPERATURE: 97.1 F | DIASTOLIC BLOOD PRESSURE: 86 MMHG | HEART RATE: 64 BPM | HEIGHT: 67 IN | SYSTOLIC BLOOD PRESSURE: 137 MMHG | WEIGHT: 182.3 LBS

## 2025-03-04 DIAGNOSIS — N18.30 CHRONIC KIDNEY DISEASE, STAGE 3 UNSPECIFIED: ICD-10-CM

## 2025-03-04 DIAGNOSIS — C79.31 SECONDARY MALIGNANT NEOPLASM OF BRAIN: ICD-10-CM

## 2025-03-04 DIAGNOSIS — Z92.89 PERSONAL HISTORY OF OTHER MEDICAL TREATMENT: ICD-10-CM

## 2025-03-04 DIAGNOSIS — C64.9 MALIGNANT NEOPLASM OF UNSPECIFIED KIDNEY, EXCEPT RENAL PELVIS: ICD-10-CM

## 2025-03-04 LAB
ALBUMIN SERPL ELPH-MCNC: 4.2 G/DL — SIGNIFICANT CHANGE UP (ref 3.3–5)
ALP SERPL-CCNC: 93 U/L — SIGNIFICANT CHANGE UP (ref 40–120)
ALT FLD-CCNC: 24 U/L — SIGNIFICANT CHANGE UP (ref 10–45)
ANION GAP SERPL CALC-SCNC: 12 MMOL/L — SIGNIFICANT CHANGE UP (ref 5–17)
AST SERPL-CCNC: 28 U/L — SIGNIFICANT CHANGE UP (ref 10–40)
BASOPHILS # BLD AUTO: 0.03 K/UL — SIGNIFICANT CHANGE UP (ref 0–0.2)
BASOPHILS NFR BLD AUTO: 0.6 % — SIGNIFICANT CHANGE UP (ref 0–2)
BILIRUB SERPL-MCNC: 0.4 MG/DL — SIGNIFICANT CHANGE UP (ref 0.2–1.2)
BUN SERPL-MCNC: 18 MG/DL — SIGNIFICANT CHANGE UP (ref 7–23)
CALCIUM SERPL-MCNC: 9.6 MG/DL — SIGNIFICANT CHANGE UP (ref 8.4–10.5)
CHLORIDE SERPL-SCNC: 99 MMOL/L — SIGNIFICANT CHANGE UP (ref 96–108)
CO2 SERPL-SCNC: 25 MMOL/L — SIGNIFICANT CHANGE UP (ref 22–31)
CREAT SERPL-MCNC: 1.43 MG/DL — HIGH (ref 0.5–1.3)
EGFR: 55 ML/MIN/1.73M2 — LOW
EGFR: 55 ML/MIN/1.73M2 — LOW
EOSINOPHIL # BLD AUTO: 0.24 K/UL — SIGNIFICANT CHANGE UP (ref 0–0.5)
EOSINOPHIL NFR BLD AUTO: 4.8 % — SIGNIFICANT CHANGE UP (ref 0–6)
GLUCOSE SERPL-MCNC: 87 MG/DL — SIGNIFICANT CHANGE UP (ref 70–99)
HCT VFR BLD CALC: 43.9 % — SIGNIFICANT CHANGE UP (ref 39–50)
HGB BLD-MCNC: 14.6 G/DL — SIGNIFICANT CHANGE UP (ref 13–17)
IMM GRANULOCYTES NFR BLD AUTO: 0.2 % — SIGNIFICANT CHANGE UP (ref 0–0.9)
LYMPHOCYTES # BLD AUTO: 1.53 K/UL — SIGNIFICANT CHANGE UP (ref 1–3.3)
LYMPHOCYTES # BLD AUTO: 30.7 % — SIGNIFICANT CHANGE UP (ref 13–44)
MCHC RBC-ENTMCNC: 26.8 PG — LOW (ref 27–34)
MCHC RBC-ENTMCNC: 33.3 G/DL — SIGNIFICANT CHANGE UP (ref 32–36)
MCV RBC AUTO: 80.6 FL — SIGNIFICANT CHANGE UP (ref 80–100)
MONOCYTES # BLD AUTO: 0.43 K/UL — SIGNIFICANT CHANGE UP (ref 0–0.9)
MONOCYTES NFR BLD AUTO: 8.6 % — SIGNIFICANT CHANGE UP (ref 2–14)
NEUTROPHILS # BLD AUTO: 2.74 K/UL — SIGNIFICANT CHANGE UP (ref 1.8–7.4)
NEUTROPHILS NFR BLD AUTO: 55.1 % — SIGNIFICANT CHANGE UP (ref 43–77)
NRBC BLD AUTO-RTO: 0 /100 WBCS — SIGNIFICANT CHANGE UP (ref 0–0)
PLATELET # BLD AUTO: 186 K/UL — SIGNIFICANT CHANGE UP (ref 150–400)
POTASSIUM SERPL-MCNC: 4.9 MMOL/L — SIGNIFICANT CHANGE UP (ref 3.5–5.3)
POTASSIUM SERPL-SCNC: 4.9 MMOL/L — SIGNIFICANT CHANGE UP (ref 3.5–5.3)
PROT SERPL-MCNC: 7.6 G/DL — SIGNIFICANT CHANGE UP (ref 6–8.3)
RBC # BLD: 5.45 M/UL — SIGNIFICANT CHANGE UP (ref 4.2–5.8)
RBC # FLD: 13.4 % — SIGNIFICANT CHANGE UP (ref 10.3–14.5)
SODIUM SERPL-SCNC: 136 MMOL/L — SIGNIFICANT CHANGE UP (ref 135–145)
WBC # BLD: 4.98 K/UL — SIGNIFICANT CHANGE UP (ref 3.8–10.5)
WBC # FLD AUTO: 4.98 K/UL — SIGNIFICANT CHANGE UP (ref 3.8–10.5)

## 2025-03-04 PROCEDURE — 99214 OFFICE O/P EST MOD 30 MIN: CPT

## 2025-03-05 ENCOUNTER — APPOINTMENT (OUTPATIENT)
Dept: ENDOCRINOLOGY | Facility: CLINIC | Age: 65
End: 2025-03-05

## 2025-03-05 LAB
T4 FREE SERPL-MCNC: 1.5 NG/DL — SIGNIFICANT CHANGE UP (ref 0.9–1.7)
TSH SERPL-MCNC: 5.5 UIU/ML — HIGH (ref 0.27–4.2)

## 2025-03-18 ENCOUNTER — NON-APPOINTMENT (OUTPATIENT)
Age: 65
End: 2025-03-18

## 2025-03-19 ENCOUNTER — APPOINTMENT (OUTPATIENT)
Dept: ENDOCRINOLOGY | Facility: CLINIC | Age: 65
End: 2025-03-19
Payer: MEDICARE

## 2025-03-19 VITALS
WEIGHT: 177 LBS | DIASTOLIC BLOOD PRESSURE: 90 MMHG | BODY MASS INDEX: 27.78 KG/M2 | RESPIRATION RATE: 16 BRPM | OXYGEN SATURATION: 99 % | TEMPERATURE: 97.1 F | HEART RATE: 89 BPM | HEIGHT: 67 IN | SYSTOLIC BLOOD PRESSURE: 141 MMHG

## 2025-03-19 DIAGNOSIS — R76.8 OTHER SPECIFIED ABNORMAL IMMUNOLOGICAL FINDINGS IN SERUM: ICD-10-CM

## 2025-03-19 DIAGNOSIS — E03.9 HYPOTHYROIDISM, UNSPECIFIED: ICD-10-CM

## 2025-03-19 PROCEDURE — 99214 OFFICE O/P EST MOD 30 MIN: CPT

## 2025-03-19 PROCEDURE — G2211 COMPLEX E/M VISIT ADD ON: CPT

## 2025-03-21 ENCOUNTER — OUTPATIENT (OUTPATIENT)
Dept: OUTPATIENT SERVICES | Facility: HOSPITAL | Age: 65
LOS: 1 days | Discharge: HOME | End: 2025-03-21

## 2025-03-21 DIAGNOSIS — Z90.5 ACQUIRED ABSENCE OF KIDNEY: Chronic | ICD-10-CM

## 2025-03-21 DIAGNOSIS — C64.9 MALIGNANT NEOPLASM OF UNSPECIFIED KIDNEY, EXCEPT RENAL PELVIS: ICD-10-CM

## 2025-03-21 DIAGNOSIS — Z95.810 PRESENCE OF AUTOMATIC (IMPLANTABLE) CARDIAC DEFIBRILLATOR: Chronic | ICD-10-CM

## 2025-03-24 ENCOUNTER — RESULT REVIEW (OUTPATIENT)
Age: 65
End: 2025-03-24

## 2025-03-24 ENCOUNTER — LABORATORY RESULT (OUTPATIENT)
Age: 65
End: 2025-03-24

## 2025-03-24 ENCOUNTER — APPOINTMENT (OUTPATIENT)
Dept: HEMATOLOGY ONCOLOGY | Facility: CLINIC | Age: 65
End: 2025-03-24

## 2025-03-24 LAB
BASOPHILS # BLD AUTO: 0.05 K/UL — SIGNIFICANT CHANGE UP (ref 0–0.2)
BASOPHILS NFR BLD AUTO: 0.9 % — SIGNIFICANT CHANGE UP (ref 0–2)
EOSINOPHIL # BLD AUTO: 0.35 K/UL — SIGNIFICANT CHANGE UP (ref 0–0.5)
EOSINOPHIL NFR BLD AUTO: 6.5 % — HIGH (ref 0–6)
HCT VFR BLD CALC: 47.7 % — SIGNIFICANT CHANGE UP (ref 39–50)
HGB BLD-MCNC: 16.2 G/DL — SIGNIFICANT CHANGE UP (ref 13–17)
IMM GRANULOCYTES NFR BLD AUTO: 0.2 % — SIGNIFICANT CHANGE UP (ref 0–0.9)
LYMPHOCYTES # BLD AUTO: 1.27 K/UL — SIGNIFICANT CHANGE UP (ref 1–3.3)
LYMPHOCYTES # BLD AUTO: 23.6 % — SIGNIFICANT CHANGE UP (ref 13–44)
MCHC RBC-ENTMCNC: 27.3 PG — SIGNIFICANT CHANGE UP (ref 27–34)
MCHC RBC-ENTMCNC: 34 G/DL — SIGNIFICANT CHANGE UP (ref 32–36)
MCV RBC AUTO: 80.4 FL — SIGNIFICANT CHANGE UP (ref 80–100)
MONOCYTES # BLD AUTO: 0.41 K/UL — SIGNIFICANT CHANGE UP (ref 0–0.9)
MONOCYTES NFR BLD AUTO: 7.6 % — SIGNIFICANT CHANGE UP (ref 2–14)
NEUTROPHILS # BLD AUTO: 3.29 K/UL — SIGNIFICANT CHANGE UP (ref 1.8–7.4)
NEUTROPHILS NFR BLD AUTO: 61.2 % — SIGNIFICANT CHANGE UP (ref 43–77)
NRBC BLD AUTO-RTO: 0 /100 WBCS — SIGNIFICANT CHANGE UP (ref 0–0)
PLATELET # BLD AUTO: 198 K/UL — SIGNIFICANT CHANGE UP (ref 150–400)
RBC # BLD: 5.93 M/UL — HIGH (ref 4.2–5.8)
RBC # FLD: 14.7 % — HIGH (ref 10.3–14.5)
WBC # BLD: 5.38 K/UL — SIGNIFICANT CHANGE UP (ref 3.8–10.5)
WBC # FLD AUTO: 5.38 K/UL — SIGNIFICANT CHANGE UP (ref 3.8–10.5)

## 2025-03-26 ENCOUNTER — APPOINTMENT (OUTPATIENT)
Dept: INFUSION THERAPY | Facility: HOSPITAL | Age: 65
End: 2025-03-26

## 2025-03-26 ENCOUNTER — APPOINTMENT (OUTPATIENT)
Dept: HEMATOLOGY ONCOLOGY | Facility: CLINIC | Age: 65
End: 2025-03-26
Payer: MEDICARE

## 2025-03-26 ENCOUNTER — RESULT REVIEW (OUTPATIENT)
Age: 65
End: 2025-03-26

## 2025-03-26 VITALS
TEMPERATURE: 98.2 F | BODY MASS INDEX: 27.71 KG/M2 | HEIGHT: 67 IN | OXYGEN SATURATION: 98 % | HEART RATE: 76 BPM | RESPIRATION RATE: 17 BRPM | WEIGHT: 176.57 LBS | SYSTOLIC BLOOD PRESSURE: 119 MMHG | DIASTOLIC BLOOD PRESSURE: 81 MMHG

## 2025-03-26 DIAGNOSIS — C64.9 MALIGNANT NEOPLASM OF UNSPECIFIED KIDNEY, EXCEPT RENAL PELVIS: ICD-10-CM

## 2025-03-26 DIAGNOSIS — C79.31 SECONDARY MALIGNANT NEOPLASM OF BRAIN: ICD-10-CM

## 2025-03-26 DIAGNOSIS — M54.50 LOW BACK PAIN, UNSPECIFIED: ICD-10-CM

## 2025-03-26 LAB
ALBUMIN SERPL ELPH-MCNC: 4.8 G/DL — SIGNIFICANT CHANGE UP (ref 3.3–5)
ALP SERPL-CCNC: 78 U/L — SIGNIFICANT CHANGE UP (ref 40–120)
ALT FLD-CCNC: 30 U/L — SIGNIFICANT CHANGE UP (ref 10–45)
ANION GAP SERPL CALC-SCNC: 14 MMOL/L — SIGNIFICANT CHANGE UP (ref 5–17)
AST SERPL-CCNC: 28 U/L — SIGNIFICANT CHANGE UP (ref 10–40)
BASOPHILS # BLD AUTO: 0.04 K/UL — SIGNIFICANT CHANGE UP (ref 0–0.2)
BASOPHILS NFR BLD AUTO: 0.6 % — SIGNIFICANT CHANGE UP (ref 0–2)
BILIRUB SERPL-MCNC: 0.5 MG/DL — SIGNIFICANT CHANGE UP (ref 0.2–1.2)
BUN SERPL-MCNC: 23 MG/DL — SIGNIFICANT CHANGE UP (ref 7–23)
CALCIUM SERPL-MCNC: 10.5 MG/DL — SIGNIFICANT CHANGE UP (ref 8.4–10.5)
CHLORIDE SERPL-SCNC: 98 MMOL/L — SIGNIFICANT CHANGE UP (ref 96–108)
CO2 SERPL-SCNC: 24 MMOL/L — SIGNIFICANT CHANGE UP (ref 22–31)
CREAT SERPL-MCNC: 1.52 MG/DL — HIGH (ref 0.5–1.3)
EGFR: 51 ML/MIN/1.73M2 — LOW
EGFR: 51 ML/MIN/1.73M2 — LOW
EOSINOPHIL # BLD AUTO: 0.23 K/UL — SIGNIFICANT CHANGE UP (ref 0–0.5)
EOSINOPHIL NFR BLD AUTO: 3.7 % — SIGNIFICANT CHANGE UP (ref 0–6)
GLUCOSE SERPL-MCNC: 82 MG/DL — SIGNIFICANT CHANGE UP (ref 70–99)
HCT VFR BLD CALC: 48.3 % — SIGNIFICANT CHANGE UP (ref 39–50)
HGB BLD-MCNC: 16 G/DL — SIGNIFICANT CHANGE UP (ref 13–17)
IMM GRANULOCYTES NFR BLD AUTO: 0.3 % — SIGNIFICANT CHANGE UP (ref 0–0.9)
LYMPHOCYTES # BLD AUTO: 1.68 K/UL — SIGNIFICANT CHANGE UP (ref 1–3.3)
LYMPHOCYTES # BLD AUTO: 26.8 % — SIGNIFICANT CHANGE UP (ref 13–44)
MCHC RBC-ENTMCNC: 27.1 PG — SIGNIFICANT CHANGE UP (ref 27–34)
MCHC RBC-ENTMCNC: 33.1 G/DL — SIGNIFICANT CHANGE UP (ref 32–36)
MCV RBC AUTO: 81.9 FL — SIGNIFICANT CHANGE UP (ref 80–100)
MONOCYTES # BLD AUTO: 0.48 K/UL — SIGNIFICANT CHANGE UP (ref 0–0.9)
MONOCYTES NFR BLD AUTO: 7.7 % — SIGNIFICANT CHANGE UP (ref 2–14)
NEUTROPHILS # BLD AUTO: 3.82 K/UL — SIGNIFICANT CHANGE UP (ref 1.8–7.4)
NEUTROPHILS NFR BLD AUTO: 60.9 % — SIGNIFICANT CHANGE UP (ref 43–77)
NRBC BLD AUTO-RTO: 0 /100 WBCS — SIGNIFICANT CHANGE UP (ref 0–0)
PLATELET # BLD AUTO: 217 K/UL — SIGNIFICANT CHANGE UP (ref 150–400)
POTASSIUM SERPL-MCNC: 5.4 MMOL/L — HIGH (ref 3.5–5.3)
POTASSIUM SERPL-SCNC: 5.4 MMOL/L — HIGH (ref 3.5–5.3)
PROT SERPL-MCNC: 8.1 G/DL — SIGNIFICANT CHANGE UP (ref 6–8.3)
RBC # BLD: 5.9 M/UL — HIGH (ref 4.2–5.8)
RBC # FLD: 15.3 % — HIGH (ref 10.3–14.5)
SODIUM SERPL-SCNC: 136 MMOL/L — SIGNIFICANT CHANGE UP (ref 135–145)
T4 FREE SERPL-MCNC: 1.6 NG/DL — SIGNIFICANT CHANGE UP (ref 0.9–1.7)
TSH SERPL-MCNC: 9.4 UIU/ML — HIGH (ref 0.27–4.2)
WBC # BLD: 6.27 K/UL — SIGNIFICANT CHANGE UP (ref 3.8–10.5)
WBC # FLD AUTO: 6.27 K/UL — SIGNIFICANT CHANGE UP (ref 3.8–10.5)

## 2025-03-26 PROCEDURE — 99214 OFFICE O/P EST MOD 30 MIN: CPT

## 2025-03-26 RX ORDER — OXYCODONE AND ACETAMINOPHEN 5; 325 MG/1; MG/1
5-325 TABLET ORAL
Qty: 60 | Refills: 0 | Status: ACTIVE | COMMUNITY
Start: 2025-03-26 | End: 1900-01-01

## 2025-03-27 DIAGNOSIS — Z51.11 ENCOUNTER FOR ANTINEOPLASTIC CHEMOTHERAPY: ICD-10-CM

## 2025-04-02 ENCOUNTER — RESULT REVIEW (OUTPATIENT)
Age: 65
End: 2025-04-02

## 2025-04-07 ENCOUNTER — OUTPATIENT (OUTPATIENT)
Dept: OUTPATIENT SERVICES | Facility: HOSPITAL | Age: 65
LOS: 1 days | End: 2025-04-07
Payer: MEDICARE

## 2025-04-07 DIAGNOSIS — M54.50 LOW BACK PAIN, UNSPECIFIED: ICD-10-CM

## 2025-04-07 DIAGNOSIS — Z95.810 PRESENCE OF AUTOMATIC (IMPLANTABLE) CARDIAC DEFIBRILLATOR: Chronic | ICD-10-CM

## 2025-04-07 DIAGNOSIS — G93.89 OTHER SPECIFIED DISORDERS OF BRAIN: ICD-10-CM

## 2025-04-07 DIAGNOSIS — Z90.5 ACQUIRED ABSENCE OF KIDNEY: Chronic | ICD-10-CM

## 2025-04-07 DIAGNOSIS — Z95.810 PRESENCE OF AUTOMATIC (IMPLANTABLE) CARDIAC DEFIBRILLATOR: ICD-10-CM

## 2025-04-07 DIAGNOSIS — C79.31 SECONDARY MALIGNANT NEOPLASM OF BRAIN: ICD-10-CM

## 2025-04-07 PROCEDURE — 72158 MRI LUMBAR SPINE W/O & W/DYE: CPT

## 2025-04-07 PROCEDURE — A9585: CPT

## 2025-04-07 PROCEDURE — 71046 X-RAY EXAM CHEST 2 VIEWS: CPT

## 2025-04-07 PROCEDURE — 70553 MRI BRAIN STEM W/O & W/DYE: CPT

## 2025-04-08 ENCOUNTER — OUTPATIENT (OUTPATIENT)
Dept: OUTPATIENT SERVICES | Facility: HOSPITAL | Age: 65
LOS: 1 days | End: 2025-04-08
Payer: MEDICARE

## 2025-04-08 ENCOUNTER — APPOINTMENT (OUTPATIENT)
Dept: MRI IMAGING | Facility: HOSPITAL | Age: 65
End: 2025-04-08

## 2025-04-08 ENCOUNTER — APPOINTMENT (OUTPATIENT)
Dept: MRI IMAGING | Facility: HOSPITAL | Age: 65
End: 2025-04-08
Payer: MEDICARE

## 2025-04-08 DIAGNOSIS — Z90.5 ACQUIRED ABSENCE OF KIDNEY: Chronic | ICD-10-CM

## 2025-04-08 DIAGNOSIS — C64.9 MALIGNANT NEOPLASM OF UNSPECIFIED KIDNEY, EXCEPT RENAL PELVIS: ICD-10-CM

## 2025-04-08 DIAGNOSIS — Z95.810 PRESENCE OF AUTOMATIC (IMPLANTABLE) CARDIAC DEFIBRILLATOR: Chronic | ICD-10-CM

## 2025-04-08 PROCEDURE — 74183 MRI ABD W/O CNTR FLWD CNTR: CPT | Mod: 26

## 2025-04-08 PROCEDURE — A9585: CPT

## 2025-04-08 PROCEDURE — 72197 MRI PELVIS W/O & W/DYE: CPT

## 2025-04-08 PROCEDURE — 72197 MRI PELVIS W/O & W/DYE: CPT | Mod: 26

## 2025-04-08 PROCEDURE — 74183 MRI ABD W/O CNTR FLWD CNTR: CPT

## 2025-04-09 ENCOUNTER — APPOINTMENT (OUTPATIENT)
Dept: RADIATION ONCOLOGY | Facility: CLINIC | Age: 65
End: 2025-04-09
Payer: MEDICARE

## 2025-04-09 VITALS
SYSTOLIC BLOOD PRESSURE: 154 MMHG | HEART RATE: 62 BPM | OXYGEN SATURATION: 100 % | WEIGHT: 181 LBS | BODY MASS INDEX: 28.35 KG/M2 | DIASTOLIC BLOOD PRESSURE: 86 MMHG

## 2025-04-09 PROCEDURE — 99215 OFFICE O/P EST HI 40 MIN: CPT

## 2025-04-09 RX ORDER — METHYLPREDNISOLONE 4 MG/1
4 TABLET ORAL
Qty: 1 | Refills: 0 | Status: ACTIVE | COMMUNITY
Start: 2025-04-09 | End: 1900-01-01

## 2025-04-11 ENCOUNTER — APPOINTMENT (OUTPATIENT)
Dept: HEMATOLOGY ONCOLOGY | Facility: CLINIC | Age: 65
End: 2025-04-11
Payer: MEDICARE

## 2025-04-11 VITALS
WEIGHT: 178.78 LBS | TEMPERATURE: 97 F | RESPIRATION RATE: 16 BRPM | SYSTOLIC BLOOD PRESSURE: 148 MMHG | BODY MASS INDEX: 28.06 KG/M2 | DIASTOLIC BLOOD PRESSURE: 90 MMHG | OXYGEN SATURATION: 98 % | HEART RATE: 77 BPM | HEIGHT: 67 IN

## 2025-04-11 DIAGNOSIS — C64.9 MALIGNANT NEOPLASM OF UNSPECIFIED KIDNEY, EXCEPT RENAL PELVIS: ICD-10-CM

## 2025-04-11 DIAGNOSIS — C79.31 SECONDARY MALIGNANT NEOPLASM OF BRAIN: ICD-10-CM

## 2025-04-11 PROCEDURE — 99214 OFFICE O/P EST MOD 30 MIN: CPT

## 2025-04-11 NOTE — PRE-OP CHECKLIST - LATEX ALLERGY
Per ventricular rate trends, the loop appears to be sensing appropriately, however, it is possible Leonel's rates are controlled and R-R variability is regular. Device nurse to check Biotron[employer] to verify if episode is persistent or paroxysmal.     Remotes- please schedule a transmission in 2 weeks.   Device- proceed with below plan accordingly        no

## 2025-04-14 ENCOUNTER — APPOINTMENT (OUTPATIENT)
Dept: CT IMAGING | Facility: IMAGING CENTER | Age: 65
End: 2025-04-14
Payer: MEDICARE

## 2025-04-14 ENCOUNTER — OUTPATIENT (OUTPATIENT)
Dept: OUTPATIENT SERVICES | Facility: HOSPITAL | Age: 65
LOS: 1 days | End: 2025-04-14
Payer: COMMERCIAL

## 2025-04-14 DIAGNOSIS — Z90.5 ACQUIRED ABSENCE OF KIDNEY: Chronic | ICD-10-CM

## 2025-04-14 DIAGNOSIS — Z95.810 PRESENCE OF AUTOMATIC (IMPLANTABLE) CARDIAC DEFIBRILLATOR: Chronic | ICD-10-CM

## 2025-04-14 DIAGNOSIS — C64.9 MALIGNANT NEOPLASM OF UNSPECIFIED KIDNEY, EXCEPT RENAL PELVIS: ICD-10-CM

## 2025-04-14 PROCEDURE — 71260 CT THORAX DX C+: CPT

## 2025-04-14 PROCEDURE — 71260 CT THORAX DX C+: CPT | Mod: 26

## 2025-04-17 ENCOUNTER — RESULT REVIEW (OUTPATIENT)
Age: 65
End: 2025-04-17

## 2025-04-21 ENCOUNTER — RESULT REVIEW (OUTPATIENT)
Age: 65
End: 2025-04-21

## 2025-04-21 ENCOUNTER — APPOINTMENT (OUTPATIENT)
Dept: HEMATOLOGY ONCOLOGY | Facility: CLINIC | Age: 65
End: 2025-04-21

## 2025-04-21 LAB
BASOPHILS # BLD AUTO: 0.02 K/UL — SIGNIFICANT CHANGE UP (ref 0–0.2)
BASOPHILS NFR BLD AUTO: 0.4 % — SIGNIFICANT CHANGE UP (ref 0–2)
EOSINOPHIL # BLD AUTO: 0.15 K/UL — SIGNIFICANT CHANGE UP (ref 0–0.5)
EOSINOPHIL NFR BLD AUTO: 2.7 % — SIGNIFICANT CHANGE UP (ref 0–6)
HCT VFR BLD CALC: 48.1 % — SIGNIFICANT CHANGE UP (ref 39–50)
HGB BLD-MCNC: 16.2 G/DL — SIGNIFICANT CHANGE UP (ref 13–17)
IMM GRANULOCYTES NFR BLD AUTO: 0.2 % — SIGNIFICANT CHANGE UP (ref 0–0.9)
LYMPHOCYTES # BLD AUTO: 1.61 K/UL — SIGNIFICANT CHANGE UP (ref 1–3.3)
LYMPHOCYTES # BLD AUTO: 29.5 % — SIGNIFICANT CHANGE UP (ref 13–44)
MCHC RBC-ENTMCNC: 28.1 PG — SIGNIFICANT CHANGE UP (ref 27–34)
MCHC RBC-ENTMCNC: 33.7 G/DL — SIGNIFICANT CHANGE UP (ref 32–36)
MCV RBC AUTO: 83.5 FL — SIGNIFICANT CHANGE UP (ref 80–100)
MONOCYTES # BLD AUTO: 0.48 K/UL — SIGNIFICANT CHANGE UP (ref 0–0.9)
MONOCYTES NFR BLD AUTO: 8.8 % — SIGNIFICANT CHANGE UP (ref 2–14)
NEUTROPHILS # BLD AUTO: 3.19 K/UL — SIGNIFICANT CHANGE UP (ref 1.8–7.4)
NEUTROPHILS NFR BLD AUTO: 58.4 % — SIGNIFICANT CHANGE UP (ref 43–77)
NRBC BLD AUTO-RTO: 0 /100 WBCS — SIGNIFICANT CHANGE UP (ref 0–0)
PLATELET # BLD AUTO: 169 K/UL — SIGNIFICANT CHANGE UP (ref 150–400)
RBC # BLD: 5.76 M/UL — SIGNIFICANT CHANGE UP (ref 4.2–5.8)
RBC # FLD: 15.6 % — HIGH (ref 10.3–14.5)
WBC # BLD: 5.46 K/UL — SIGNIFICANT CHANGE UP (ref 3.8–10.5)
WBC # FLD AUTO: 5.46 K/UL — SIGNIFICANT CHANGE UP (ref 3.8–10.5)

## 2025-04-23 ENCOUNTER — APPOINTMENT (OUTPATIENT)
Dept: INFUSION THERAPY | Facility: HOSPITAL | Age: 65
End: 2025-04-23

## 2025-04-23 ENCOUNTER — APPOINTMENT (OUTPATIENT)
Dept: HEMATOLOGY ONCOLOGY | Facility: CLINIC | Age: 65
End: 2025-04-23
Payer: MEDICARE

## 2025-04-23 VITALS
HEART RATE: 78 BPM | TEMPERATURE: 96.8 F | BODY MASS INDEX: 27.97 KG/M2 | DIASTOLIC BLOOD PRESSURE: 84 MMHG | SYSTOLIC BLOOD PRESSURE: 133 MMHG | RESPIRATION RATE: 16 BRPM | OXYGEN SATURATION: 99 % | WEIGHT: 178.57 LBS

## 2025-04-23 DIAGNOSIS — C64.9 MALIGNANT NEOPLASM OF UNSPECIFIED KIDNEY, EXCEPT RENAL PELVIS: ICD-10-CM

## 2025-04-23 DIAGNOSIS — C79.31 SECONDARY MALIGNANT NEOPLASM OF BRAIN: ICD-10-CM

## 2025-04-23 DIAGNOSIS — E87.5 HYPERKALEMIA: ICD-10-CM

## 2025-04-23 PROCEDURE — 99215 OFFICE O/P EST HI 40 MIN: CPT | Mod: 25

## 2025-04-23 RX ORDER — SODIUM ZIRCONIUM CYCLOSILICATE 5 G/5G
5 POWDER, FOR SUSPENSION ORAL DAILY
Qty: 1 | Refills: 0 | Status: ACTIVE | COMMUNITY
Start: 2025-04-23 | End: 1900-01-01

## 2025-04-30 ENCOUNTER — APPOINTMENT (OUTPATIENT)
Dept: ENDOCRINOLOGY | Facility: CLINIC | Age: 65
End: 2025-04-30
Payer: MEDICARE

## 2025-04-30 DIAGNOSIS — R76.8 OTHER SPECIFIED ABNORMAL IMMUNOLOGICAL FINDINGS IN SERUM: ICD-10-CM

## 2025-04-30 DIAGNOSIS — Z92.89 PERSONAL HISTORY OF OTHER MEDICAL TREATMENT: ICD-10-CM

## 2025-04-30 DIAGNOSIS — E03.9 HYPOTHYROIDISM, UNSPECIFIED: ICD-10-CM

## 2025-04-30 PROCEDURE — G2211 COMPLEX E/M VISIT ADD ON: CPT | Mod: 2W

## 2025-04-30 PROCEDURE — 99214 OFFICE O/P EST MOD 30 MIN: CPT | Mod: 2W

## 2025-05-12 ENCOUNTER — APPOINTMENT (OUTPATIENT)
Dept: GERIATRICS | Facility: CLINIC | Age: 65
End: 2025-05-12

## 2025-05-12 ENCOUNTER — RESULT REVIEW (OUTPATIENT)
Age: 65
End: 2025-05-12

## 2025-05-12 ENCOUNTER — APPOINTMENT (OUTPATIENT)
Dept: HEMATOLOGY ONCOLOGY | Facility: CLINIC | Age: 65
End: 2025-05-12

## 2025-05-12 LAB
BASOPHILS # BLD AUTO: 0.02 K/UL — SIGNIFICANT CHANGE UP (ref 0–0.2)
BASOPHILS NFR BLD AUTO: 0.4 % — SIGNIFICANT CHANGE UP (ref 0–2)
EOSINOPHIL # BLD AUTO: 0.4 K/UL — SIGNIFICANT CHANGE UP (ref 0–0.5)
EOSINOPHIL NFR BLD AUTO: 7.1 % — HIGH (ref 0–6)
HCT VFR BLD CALC: 44.7 % — SIGNIFICANT CHANGE UP (ref 39–50)
HGB BLD-MCNC: 15.1 G/DL — SIGNIFICANT CHANGE UP (ref 13–17)
IMM GRANULOCYTES NFR BLD AUTO: 0.2 % — SIGNIFICANT CHANGE UP (ref 0–0.9)
LYMPHOCYTES # BLD AUTO: 0.62 K/UL — LOW (ref 1–3.3)
LYMPHOCYTES # BLD AUTO: 10.9 % — LOW (ref 13–44)
MCHC RBC-ENTMCNC: 28.4 PG — SIGNIFICANT CHANGE UP (ref 27–34)
MCHC RBC-ENTMCNC: 33.8 G/DL — SIGNIFICANT CHANGE UP (ref 32–36)
MCV RBC AUTO: 84 FL — SIGNIFICANT CHANGE UP (ref 80–100)
MONOCYTES # BLD AUTO: 0.44 K/UL — SIGNIFICANT CHANGE UP (ref 0–0.9)
MONOCYTES NFR BLD AUTO: 7.8 % — SIGNIFICANT CHANGE UP (ref 2–14)
NEUTROPHILS # BLD AUTO: 4.18 K/UL — SIGNIFICANT CHANGE UP (ref 1.8–7.4)
NEUTROPHILS NFR BLD AUTO: 73.6 % — SIGNIFICANT CHANGE UP (ref 43–77)
NRBC BLD AUTO-RTO: 0 /100 WBCS — SIGNIFICANT CHANGE UP (ref 0–0)
PLATELET # BLD AUTO: 150 K/UL — SIGNIFICANT CHANGE UP (ref 150–400)
RBC # BLD: 5.32 M/UL — SIGNIFICANT CHANGE UP (ref 4.2–5.8)
RBC # FLD: 15.6 % — HIGH (ref 10.3–14.5)
WBC # BLD: 5.67 K/UL — SIGNIFICANT CHANGE UP (ref 3.8–10.5)
WBC # FLD AUTO: 5.67 K/UL — SIGNIFICANT CHANGE UP (ref 3.8–10.5)

## 2025-05-13 ENCOUNTER — APPOINTMENT (OUTPATIENT)
Dept: UROLOGY | Facility: CLINIC | Age: 65
End: 2025-05-13

## 2025-05-13 DIAGNOSIS — R35.1 NOCTURIA: ICD-10-CM

## 2025-05-14 ENCOUNTER — APPOINTMENT (OUTPATIENT)
Dept: INFUSION THERAPY | Facility: HOSPITAL | Age: 65
End: 2025-05-14

## 2025-05-14 ENCOUNTER — APPOINTMENT (OUTPATIENT)
Dept: HEMATOLOGY ONCOLOGY | Facility: CLINIC | Age: 65
End: 2025-05-14

## 2025-05-15 ENCOUNTER — APPOINTMENT (OUTPATIENT)
Dept: MRI IMAGING | Facility: HOSPITAL | Age: 65
End: 2025-05-15
Payer: MEDICARE

## 2025-05-15 ENCOUNTER — OUTPATIENT (OUTPATIENT)
Dept: OUTPATIENT SERVICES | Facility: HOSPITAL | Age: 65
LOS: 1 days | End: 2025-05-15
Payer: MEDICARE

## 2025-05-15 DIAGNOSIS — M54.50 LOW BACK PAIN, UNSPECIFIED: ICD-10-CM

## 2025-05-15 DIAGNOSIS — Z95.810 PRESENCE OF AUTOMATIC (IMPLANTABLE) CARDIAC DEFIBRILLATOR: Chronic | ICD-10-CM

## 2025-05-15 DIAGNOSIS — Z90.5 ACQUIRED ABSENCE OF KIDNEY: Chronic | ICD-10-CM

## 2025-05-15 DIAGNOSIS — C64.9 MALIGNANT NEOPLASM OF UNSPECIFIED KIDNEY, EXCEPT RENAL PELVIS: ICD-10-CM

## 2025-05-15 DIAGNOSIS — Z95.810 PRESENCE OF AUTOMATIC (IMPLANTABLE) CARDIAC DEFIBRILLATOR: ICD-10-CM

## 2025-05-15 PROCEDURE — 72158 MRI LUMBAR SPINE W/O & W/DYE: CPT

## 2025-05-15 PROCEDURE — 71046 X-RAY EXAM CHEST 2 VIEWS: CPT

## 2025-05-15 PROCEDURE — 71046 X-RAY EXAM CHEST 2 VIEWS: CPT | Mod: 26

## 2025-05-15 PROCEDURE — A9585: CPT

## 2025-05-15 PROCEDURE — 72158 MRI LUMBAR SPINE W/O & W/DYE: CPT | Mod: 26

## 2025-05-20 ENCOUNTER — APPOINTMENT (OUTPATIENT)
Dept: HOME HEALTH SERVICES | Facility: HOME HEALTH | Age: 65
End: 2025-05-20
Payer: MEDICARE

## 2025-05-20 VITALS
SYSTOLIC BLOOD PRESSURE: 121 MMHG | DIASTOLIC BLOOD PRESSURE: 70 MMHG | HEART RATE: 77 BPM | RESPIRATION RATE: 14 BRPM | OXYGEN SATURATION: 99 %

## 2025-05-20 VITALS — BODY MASS INDEX: 28.04 KG/M2 | WEIGHT: 179 LBS

## 2025-05-20 DIAGNOSIS — I25.10 ATHEROSCLEROTIC HEART DISEASE OF NATIVE CORONARY ARTERY W/OUT ANGINA PECTORIS: ICD-10-CM

## 2025-05-20 DIAGNOSIS — N17.9 ACUTE KIDNEY FAILURE, UNSPECIFIED: ICD-10-CM

## 2025-05-20 DIAGNOSIS — G93.89 OTHER SPECIFIED DISORDERS OF BRAIN: ICD-10-CM

## 2025-05-20 DIAGNOSIS — N28.89 OTHER SPECIFIED DISORDERS OF KIDNEY AND URETER: ICD-10-CM

## 2025-05-20 DIAGNOSIS — M54.50 LOW BACK PAIN, UNSPECIFIED: ICD-10-CM

## 2025-05-20 DIAGNOSIS — Z95.810 PRESENCE OF AUTOMATIC (IMPLANTABLE) CARDIAC DEFIBRILLATOR: ICD-10-CM

## 2025-05-20 DIAGNOSIS — N18.30 CHRONIC KIDNEY DISEASE, STAGE 3 UNSPECIFIED: ICD-10-CM

## 2025-05-20 DIAGNOSIS — I10 ESSENTIAL (PRIMARY) HYPERTENSION: ICD-10-CM

## 2025-05-20 DIAGNOSIS — M62.830 MUSCLE SPASM OF BACK: ICD-10-CM

## 2025-05-20 DIAGNOSIS — Z86.39 PERSONAL HISTORY OF OTHER ENDOCRINE, NUTRITIONAL AND METABOLIC DISEASE: ICD-10-CM

## 2025-05-20 PROCEDURE — 99349 HOME/RES VST EST MOD MDM 40: CPT

## 2025-05-20 RX ORDER — METHOCARBAMOL 750 MG/1
750 TABLET, FILM COATED ORAL
Refills: 0 | Status: ACTIVE | COMMUNITY
Start: 2025-05-20

## 2025-05-20 RX ORDER — BACLOFEN 10 MG/1
10 TABLET ORAL 3 TIMES DAILY
Refills: 0 | Status: ACTIVE | COMMUNITY
Start: 2025-05-20

## 2025-05-28 ENCOUNTER — APPOINTMENT (OUTPATIENT)
Dept: RADIATION ONCOLOGY | Facility: CLINIC | Age: 65
End: 2025-05-28
Payer: MEDICARE

## 2025-05-28 VITALS
BODY MASS INDEX: 27.63 KG/M2 | HEART RATE: 70 BPM | HEIGHT: 67 IN | WEIGHT: 176.04 LBS | OXYGEN SATURATION: 99 % | SYSTOLIC BLOOD PRESSURE: 136 MMHG | DIASTOLIC BLOOD PRESSURE: 84 MMHG | RESPIRATION RATE: 15 BRPM

## 2025-05-28 PROCEDURE — 99214 OFFICE O/P EST MOD 30 MIN: CPT

## 2025-05-28 NOTE — ASU PATIENT PROFILE, ADULT - SURGICAL SITE INCISION
Note Text (......Xxx Chief Complaint.): This diagnosis correlates with the Other (Free Text): Per Dr Son stop every thing topicals, perfumes , soap just water . Use free and clear detergent Render Risk Assessment In Note?: no Detail Level: Zone no

## 2025-05-29 ENCOUNTER — OUTPATIENT (OUTPATIENT)
Dept: OUTPATIENT SERVICES | Facility: HOSPITAL | Age: 65
LOS: 1 days | Discharge: HOME | End: 2025-05-29

## 2025-05-29 DIAGNOSIS — E03.9 HYPOTHYROIDISM, UNSPECIFIED: ICD-10-CM

## 2025-05-29 DIAGNOSIS — C64.9 MALIGNANT NEOPLASM OF UNSPECIFIED KIDNEY, EXCEPT RENAL PELVIS: ICD-10-CM

## 2025-05-29 DIAGNOSIS — R76.8 OTHER SPECIFIED ABNORMAL IMMUNOLOGICAL FINDINGS IN SERUM: ICD-10-CM

## 2025-05-29 DIAGNOSIS — Z90.5 ACQUIRED ABSENCE OF KIDNEY: Chronic | ICD-10-CM

## 2025-05-30 ENCOUNTER — APPOINTMENT (OUTPATIENT)
Dept: GERIATRICS | Facility: CLINIC | Age: 65
End: 2025-05-30

## 2025-05-30 PROBLEM — Z92.21 HISTORY OF ANTINEOPLASTIC THERAPY: Status: ACTIVE | Noted: 2025-05-30

## 2025-06-02 ENCOUNTER — APPOINTMENT (OUTPATIENT)
Dept: GERIATRICS | Facility: CLINIC | Age: 65
End: 2025-06-02
Payer: MEDICARE

## 2025-06-02 VITALS
HEART RATE: 71 BPM | TEMPERATURE: 98.2 F | DIASTOLIC BLOOD PRESSURE: 83 MMHG | HEIGHT: 66.97 IN | SYSTOLIC BLOOD PRESSURE: 128 MMHG | OXYGEN SATURATION: 99 % | BODY MASS INDEX: 27.47 KG/M2 | RESPIRATION RATE: 16 BRPM | WEIGHT: 175.02 LBS

## 2025-06-02 DIAGNOSIS — C64.9 MALIGNANT NEOPLASM OF UNSPECIFIED KIDNEY, EXCEPT RENAL PELVIS: ICD-10-CM

## 2025-06-02 DIAGNOSIS — M54.50 LOW BACK PAIN, UNSPECIFIED: ICD-10-CM

## 2025-06-02 DIAGNOSIS — G89.29 LOW BACK PAIN, UNSPECIFIED: ICD-10-CM

## 2025-06-02 DIAGNOSIS — R63.0 ANOREXIA: ICD-10-CM

## 2025-06-02 DIAGNOSIS — Z51.5 ENCOUNTER FOR PALLIATIVE CARE: ICD-10-CM

## 2025-06-02 PROCEDURE — 99205 OFFICE O/P NEW HI 60 MIN: CPT

## 2025-06-02 RX ORDER — OXYCODONE 5 MG/1
5 TABLET ORAL
Qty: 84 | Refills: 0 | Status: ACTIVE | COMMUNITY
Start: 2025-06-02 | End: 1900-01-01

## 2025-06-03 PROBLEM — R63.0 APPETITE LOSS: Status: ACTIVE | Noted: 2025-06-03

## 2025-06-03 PROBLEM — Z51.5 ENCOUNTER FOR PALLIATIVE CARE: Status: ACTIVE | Noted: 2025-06-03

## 2025-06-04 ENCOUNTER — RESULT REVIEW (OUTPATIENT)
Age: 65
End: 2025-06-04

## 2025-06-04 ENCOUNTER — APPOINTMENT (OUTPATIENT)
Dept: INFUSION THERAPY | Facility: HOSPITAL | Age: 65
End: 2025-06-04

## 2025-06-04 ENCOUNTER — APPOINTMENT (OUTPATIENT)
Dept: HEMATOLOGY ONCOLOGY | Facility: CLINIC | Age: 65
End: 2025-06-04
Payer: MEDICARE

## 2025-06-04 VITALS
HEART RATE: 68 BPM | SYSTOLIC BLOOD PRESSURE: 152 MMHG | BODY MASS INDEX: 27.82 KG/M2 | WEIGHT: 177.47 LBS | RESPIRATION RATE: 16 BRPM | DIASTOLIC BLOOD PRESSURE: 86 MMHG | OXYGEN SATURATION: 98 % | TEMPERATURE: 97 F

## 2025-06-04 DIAGNOSIS — Z92.21 PERSONAL HISTORY OF ANTINEOPLASTIC CHEMOTHERAPY: ICD-10-CM

## 2025-06-04 DIAGNOSIS — Z92.89 PERSONAL HISTORY OF OTHER MEDICAL TREATMENT: ICD-10-CM

## 2025-06-04 DIAGNOSIS — C79.31 SECONDARY MALIGNANT NEOPLASM OF BRAIN: ICD-10-CM

## 2025-06-04 LAB
ANION GAP SERPL CALC-SCNC: 9 MMOL/L — SIGNIFICANT CHANGE UP (ref 5–17)
BASOPHILS # BLD AUTO: 0.04 K/UL — SIGNIFICANT CHANGE UP (ref 0–0.2)
BASOPHILS NFR BLD AUTO: 0.7 % — SIGNIFICANT CHANGE UP (ref 0–2)
BUN SERPL-MCNC: 20 MG/DL — SIGNIFICANT CHANGE UP (ref 7–23)
CALCIUM SERPL-MCNC: 9.8 MG/DL — SIGNIFICANT CHANGE UP (ref 8.4–10.5)
CHLORIDE SERPL-SCNC: 100 MMOL/L — SIGNIFICANT CHANGE UP (ref 96–108)
CO2 SERPL-SCNC: 27 MMOL/L — SIGNIFICANT CHANGE UP (ref 22–31)
CREAT SERPL-MCNC: 1.3 MG/DL — SIGNIFICANT CHANGE UP (ref 0.5–1.3)
EGFR: 61 ML/MIN/1.73M2 — SIGNIFICANT CHANGE UP
EGFR: 61 ML/MIN/1.73M2 — SIGNIFICANT CHANGE UP
EOSINOPHIL # BLD AUTO: 0.29 K/UL — SIGNIFICANT CHANGE UP (ref 0–0.5)
EOSINOPHIL NFR BLD AUTO: 5.3 % — SIGNIFICANT CHANGE UP (ref 0–6)
GLUCOSE SERPL-MCNC: 100 MG/DL — HIGH (ref 70–99)
HCT VFR BLD CALC: 43.8 % — SIGNIFICANT CHANGE UP (ref 39–50)
HGB BLD-MCNC: 14.4 G/DL — SIGNIFICANT CHANGE UP (ref 13–17)
IMM GRANULOCYTES NFR BLD AUTO: 0.4 % — SIGNIFICANT CHANGE UP (ref 0–0.9)
LYMPHOCYTES # BLD AUTO: 1.49 K/UL — SIGNIFICANT CHANGE UP (ref 1–3.3)
LYMPHOCYTES # BLD AUTO: 27.3 % — SIGNIFICANT CHANGE UP (ref 13–44)
MCHC RBC-ENTMCNC: 27.9 PG — SIGNIFICANT CHANGE UP (ref 27–34)
MCHC RBC-ENTMCNC: 32.9 G/DL — SIGNIFICANT CHANGE UP (ref 32–36)
MCV RBC AUTO: 84.9 FL — SIGNIFICANT CHANGE UP (ref 80–100)
MONOCYTES # BLD AUTO: 0.43 K/UL — SIGNIFICANT CHANGE UP (ref 0–0.9)
MONOCYTES NFR BLD AUTO: 7.9 % — SIGNIFICANT CHANGE UP (ref 2–14)
NEUTROPHILS # BLD AUTO: 3.19 K/UL — SIGNIFICANT CHANGE UP (ref 1.8–7.4)
NEUTROPHILS NFR BLD AUTO: 58.4 % — SIGNIFICANT CHANGE UP (ref 43–77)
NRBC BLD AUTO-RTO: 0 /100 WBCS — SIGNIFICANT CHANGE UP (ref 0–0)
PLATELET # BLD AUTO: 163 K/UL — SIGNIFICANT CHANGE UP (ref 150–400)
POTASSIUM SERPL-MCNC: 4.8 MMOL/L — SIGNIFICANT CHANGE UP (ref 3.5–5.3)
POTASSIUM SERPL-SCNC: 4.8 MMOL/L — SIGNIFICANT CHANGE UP (ref 3.5–5.3)
RBC # BLD: 5.16 M/UL — SIGNIFICANT CHANGE UP (ref 4.2–5.8)
RBC # FLD: 14.2 % — SIGNIFICANT CHANGE UP (ref 10.3–14.5)
SODIUM SERPL-SCNC: 137 MMOL/L — SIGNIFICANT CHANGE UP (ref 135–145)
WBC # BLD: 5.46 K/UL — SIGNIFICANT CHANGE UP (ref 3.8–10.5)
WBC # FLD AUTO: 5.46 K/UL — SIGNIFICANT CHANGE UP (ref 3.8–10.5)

## 2025-06-04 PROCEDURE — G2211 COMPLEX E/M VISIT ADD ON: CPT

## 2025-06-04 PROCEDURE — 99214 OFFICE O/P EST MOD 30 MIN: CPT

## 2025-06-04 RX ORDER — ACETAMINOPHEN 500 MG/1
500 TABLET, COATED ORAL EVERY 8 HOURS
Qty: 120 | Refills: 0 | Status: ACTIVE | COMMUNITY
Start: 2025-06-02 | End: 1900-01-01

## 2025-06-06 ENCOUNTER — APPOINTMENT (OUTPATIENT)
Dept: UROLOGY | Facility: CLINIC | Age: 65
End: 2025-06-06

## 2025-06-25 ENCOUNTER — APPOINTMENT (OUTPATIENT)
Dept: INFUSION THERAPY | Facility: HOSPITAL | Age: 65
End: 2025-06-25

## 2025-06-25 ENCOUNTER — RESULT REVIEW (OUTPATIENT)
Age: 65
End: 2025-06-25

## 2025-06-25 ENCOUNTER — APPOINTMENT (OUTPATIENT)
Dept: GERIATRICS | Facility: CLINIC | Age: 65
End: 2025-06-25
Payer: MEDICARE

## 2025-06-25 ENCOUNTER — APPOINTMENT (OUTPATIENT)
Dept: HEMATOLOGY ONCOLOGY | Facility: CLINIC | Age: 65
End: 2025-06-25
Payer: MEDICARE

## 2025-06-25 VITALS
RESPIRATION RATE: 16 BRPM | SYSTOLIC BLOOD PRESSURE: 130 MMHG | WEIGHT: 178.55 LBS | TEMPERATURE: 97.3 F | BODY MASS INDEX: 27.99 KG/M2 | OXYGEN SATURATION: 98 % | HEART RATE: 78 BPM | DIASTOLIC BLOOD PRESSURE: 80 MMHG

## 2025-06-25 LAB
ALBUMIN SERPL ELPH-MCNC: 4.2 G/DL — SIGNIFICANT CHANGE UP (ref 3.3–5)
ALP SERPL-CCNC: 69 U/L — SIGNIFICANT CHANGE UP (ref 40–120)
ALT FLD-CCNC: 14 U/L — SIGNIFICANT CHANGE UP (ref 10–45)
ANION GAP SERPL CALC-SCNC: 16 MMOL/L — SIGNIFICANT CHANGE UP (ref 5–17)
AST SERPL-CCNC: 21 U/L — SIGNIFICANT CHANGE UP (ref 10–40)
BASOPHILS # BLD AUTO: 0.03 K/UL — SIGNIFICANT CHANGE UP (ref 0–0.2)
BASOPHILS NFR BLD AUTO: 0.5 % — SIGNIFICANT CHANGE UP (ref 0–2)
BILIRUB SERPL-MCNC: 0.3 MG/DL — SIGNIFICANT CHANGE UP (ref 0.2–1.2)
BUN SERPL-MCNC: 20 MG/DL — SIGNIFICANT CHANGE UP (ref 7–23)
CALCIUM SERPL-MCNC: 9.4 MG/DL — SIGNIFICANT CHANGE UP (ref 8.4–10.5)
CHLORIDE SERPL-SCNC: 102 MMOL/L — SIGNIFICANT CHANGE UP (ref 96–108)
CO2 SERPL-SCNC: 22 MMOL/L — SIGNIFICANT CHANGE UP (ref 22–31)
CREAT SERPL-MCNC: 1.3 MG/DL — SIGNIFICANT CHANGE UP (ref 0.5–1.3)
EGFR: 61 ML/MIN/1.73M2 — SIGNIFICANT CHANGE UP
EGFR: 61 ML/MIN/1.73M2 — SIGNIFICANT CHANGE UP
EOSINOPHIL # BLD AUTO: 0.33 K/UL — SIGNIFICANT CHANGE UP (ref 0–0.5)
EOSINOPHIL NFR BLD AUTO: 5.3 % — SIGNIFICANT CHANGE UP (ref 0–6)
GLUCOSE SERPL-MCNC: 103 MG/DL — HIGH (ref 70–99)
HCT VFR BLD CALC: 43.3 % — SIGNIFICANT CHANGE UP (ref 39–50)
HGB BLD-MCNC: 14.3 G/DL — SIGNIFICANT CHANGE UP (ref 13–17)
IMM GRANULOCYTES NFR BLD AUTO: 1 % — HIGH (ref 0–0.9)
LYMPHOCYTES # BLD AUTO: 1.27 K/UL — SIGNIFICANT CHANGE UP (ref 1–3.3)
LYMPHOCYTES # BLD AUTO: 20.5 % — SIGNIFICANT CHANGE UP (ref 13–44)
MCHC RBC-ENTMCNC: 28.7 PG — SIGNIFICANT CHANGE UP (ref 27–34)
MCHC RBC-ENTMCNC: 33 G/DL — SIGNIFICANT CHANGE UP (ref 32–36)
MCV RBC AUTO: 86.9 FL — SIGNIFICANT CHANGE UP (ref 80–100)
MONOCYTES # BLD AUTO: 0.57 K/UL — SIGNIFICANT CHANGE UP (ref 0–0.9)
MONOCYTES NFR BLD AUTO: 9.2 % — SIGNIFICANT CHANGE UP (ref 2–14)
NEUTROPHILS # BLD AUTO: 3.95 K/UL — SIGNIFICANT CHANGE UP (ref 1.8–7.4)
NEUTROPHILS NFR BLD AUTO: 63.5 % — SIGNIFICANT CHANGE UP (ref 43–77)
NRBC BLD AUTO-RTO: 0 /100 WBCS — SIGNIFICANT CHANGE UP (ref 0–0)
PLATELET # BLD AUTO: 161 K/UL — SIGNIFICANT CHANGE UP (ref 150–400)
POTASSIUM SERPL-MCNC: 4.6 MMOL/L — SIGNIFICANT CHANGE UP (ref 3.5–5.3)
POTASSIUM SERPL-SCNC: 4.6 MMOL/L — SIGNIFICANT CHANGE UP (ref 3.5–5.3)
PROT SERPL-MCNC: 7.1 G/DL — SIGNIFICANT CHANGE UP (ref 6–8.3)
RBC # BLD: 4.98 M/UL — SIGNIFICANT CHANGE UP (ref 4.2–5.8)
RBC # FLD: 14.6 % — HIGH (ref 10.3–14.5)
SODIUM SERPL-SCNC: 139 MMOL/L — SIGNIFICANT CHANGE UP (ref 135–145)
T4 FREE SERPL-MCNC: 1.3 NG/DL — SIGNIFICANT CHANGE UP (ref 0.9–1.8)
TSH SERPL-MCNC: 1.08 UIU/ML — SIGNIFICANT CHANGE UP (ref 0.27–4.2)
WBC # BLD: 6.21 K/UL — SIGNIFICANT CHANGE UP (ref 3.8–10.5)
WBC # FLD AUTO: 6.21 K/UL — SIGNIFICANT CHANGE UP (ref 3.8–10.5)

## 2025-06-25 PROCEDURE — 99213 OFFICE O/P EST LOW 20 MIN: CPT

## 2025-06-25 PROCEDURE — 99214 OFFICE O/P EST MOD 30 MIN: CPT

## 2025-07-01 ENCOUNTER — APPOINTMENT (OUTPATIENT)
Dept: MRI IMAGING | Facility: HOSPITAL | Age: 65
End: 2025-07-01
Payer: MEDICARE

## 2025-07-01 ENCOUNTER — OUTPATIENT (OUTPATIENT)
Dept: OUTPATIENT SERVICES | Facility: HOSPITAL | Age: 65
LOS: 1 days | End: 2025-07-01
Payer: MEDICARE

## 2025-07-01 DIAGNOSIS — C79.31 SECONDARY MALIGNANT NEOPLASM OF BRAIN: ICD-10-CM

## 2025-07-01 DIAGNOSIS — Z90.5 ACQUIRED ABSENCE OF KIDNEY: Chronic | ICD-10-CM

## 2025-07-01 DIAGNOSIS — Z95.810 PRESENCE OF AUTOMATIC (IMPLANTABLE) CARDIAC DEFIBRILLATOR: ICD-10-CM

## 2025-07-01 DIAGNOSIS — Z95.810 PRESENCE OF AUTOMATIC (IMPLANTABLE) CARDIAC DEFIBRILLATOR: Chronic | ICD-10-CM

## 2025-07-01 PROCEDURE — A9585: CPT

## 2025-07-01 PROCEDURE — 71046 X-RAY EXAM CHEST 2 VIEWS: CPT | Mod: 26

## 2025-07-01 PROCEDURE — 70553 MRI BRAIN STEM W/O & W/DYE: CPT

## 2025-07-01 PROCEDURE — 71046 X-RAY EXAM CHEST 2 VIEWS: CPT

## 2025-07-01 PROCEDURE — 70553 MRI BRAIN STEM W/O & W/DYE: CPT | Mod: 26

## 2025-07-09 ENCOUNTER — NON-APPOINTMENT (OUTPATIENT)
Age: 65
End: 2025-07-09

## 2025-07-16 ENCOUNTER — APPOINTMENT (OUTPATIENT)
Dept: INFUSION THERAPY | Facility: HOSPITAL | Age: 65
End: 2025-07-16

## 2025-07-16 ENCOUNTER — APPOINTMENT (OUTPATIENT)
Dept: GERIATRICS | Facility: CLINIC | Age: 65
End: 2025-07-16
Payer: MEDICARE

## 2025-07-16 ENCOUNTER — RESULT REVIEW (OUTPATIENT)
Age: 65
End: 2025-07-16

## 2025-07-16 ENCOUNTER — APPOINTMENT (OUTPATIENT)
Dept: RADIATION ONCOLOGY | Facility: CLINIC | Age: 65
End: 2025-07-16
Payer: MEDICARE

## 2025-07-16 ENCOUNTER — APPOINTMENT (OUTPATIENT)
Dept: HEMATOLOGY ONCOLOGY | Facility: CLINIC | Age: 65
End: 2025-07-16
Payer: MEDICARE

## 2025-07-16 VITALS
BODY MASS INDEX: 27.99 KG/M2 | WEIGHT: 178.57 LBS | TEMPERATURE: 97.8 F | RESPIRATION RATE: 16 BRPM | OXYGEN SATURATION: 98 % | HEART RATE: 79 BPM | DIASTOLIC BLOOD PRESSURE: 76 MMHG | SYSTOLIC BLOOD PRESSURE: 127 MMHG

## 2025-07-16 PROBLEM — Z01.818 PREPROCEDURAL EXAMINATION: Status: ACTIVE | Noted: 2025-07-16

## 2025-07-16 LAB
ALBUMIN SERPL ELPH-MCNC: 4.1 G/DL — SIGNIFICANT CHANGE UP (ref 3.3–5)
ALP SERPL-CCNC: 82 U/L — SIGNIFICANT CHANGE UP (ref 40–120)
ALT FLD-CCNC: 13 U/L — SIGNIFICANT CHANGE UP (ref 10–45)
ANION GAP SERPL CALC-SCNC: 12 MMOL/L — SIGNIFICANT CHANGE UP (ref 5–17)
AST SERPL-CCNC: 26 U/L — SIGNIFICANT CHANGE UP (ref 10–40)
BASOPHILS # BLD AUTO: 0.03 K/UL — SIGNIFICANT CHANGE UP (ref 0–0.2)
BASOPHILS NFR BLD AUTO: 0.5 % — SIGNIFICANT CHANGE UP (ref 0–2)
BILIRUB SERPL-MCNC: 0.4 MG/DL — SIGNIFICANT CHANGE UP (ref 0.2–1.2)
BUN SERPL-MCNC: 21 MG/DL — SIGNIFICANT CHANGE UP (ref 7–23)
CALCIUM SERPL-MCNC: 9.5 MG/DL — SIGNIFICANT CHANGE UP (ref 8.4–10.5)
CHLORIDE SERPL-SCNC: 101 MMOL/L — SIGNIFICANT CHANGE UP (ref 96–108)
CO2 SERPL-SCNC: 25 MMOL/L — SIGNIFICANT CHANGE UP (ref 22–31)
CREAT SERPL-MCNC: 1.36 MG/DL — HIGH (ref 0.5–1.3)
EGFR: 58 ML/MIN/1.73M2 — LOW
EGFR: 58 ML/MIN/1.73M2 — LOW
EOSINOPHIL # BLD AUTO: 0.17 K/UL — SIGNIFICANT CHANGE UP (ref 0–0.5)
EOSINOPHIL NFR BLD AUTO: 3 % — SIGNIFICANT CHANGE UP (ref 0–6)
GLUCOSE SERPL-MCNC: 106 MG/DL — HIGH (ref 70–99)
HCT VFR BLD CALC: 41.5 % — SIGNIFICANT CHANGE UP (ref 39–50)
HGB BLD-MCNC: 13.4 G/DL — SIGNIFICANT CHANGE UP (ref 13–17)
IMM GRANULOCYTES NFR BLD AUTO: 0.3 % — SIGNIFICANT CHANGE UP (ref 0–0.9)
LYMPHOCYTES # BLD AUTO: 1.43 K/UL — SIGNIFICANT CHANGE UP (ref 1–3.3)
LYMPHOCYTES # BLD AUTO: 25 % — SIGNIFICANT CHANGE UP (ref 13–44)
MCHC RBC-ENTMCNC: 28.2 PG — SIGNIFICANT CHANGE UP (ref 27–34)
MCHC RBC-ENTMCNC: 32.3 G/DL — SIGNIFICANT CHANGE UP (ref 32–36)
MCV RBC AUTO: 87.2 FL — SIGNIFICANT CHANGE UP (ref 80–100)
MONOCYTES # BLD AUTO: 0.58 K/UL — SIGNIFICANT CHANGE UP (ref 0–0.9)
MONOCYTES NFR BLD AUTO: 10.1 % — SIGNIFICANT CHANGE UP (ref 2–14)
NEUTROPHILS # BLD AUTO: 3.5 K/UL — SIGNIFICANT CHANGE UP (ref 1.8–7.4)
NEUTROPHILS NFR BLD AUTO: 61.1 % — SIGNIFICANT CHANGE UP (ref 43–77)
NRBC BLD AUTO-RTO: 0 /100 WBCS — SIGNIFICANT CHANGE UP (ref 0–0)
PLATELET # BLD AUTO: 197 K/UL — SIGNIFICANT CHANGE UP (ref 150–400)
POTASSIUM SERPL-MCNC: 5.3 MMOL/L — SIGNIFICANT CHANGE UP (ref 3.5–5.3)
POTASSIUM SERPL-SCNC: 5.3 MMOL/L — SIGNIFICANT CHANGE UP (ref 3.5–5.3)
PROT SERPL-MCNC: 7.1 G/DL — SIGNIFICANT CHANGE UP (ref 6–8.3)
RBC # BLD: 4.76 M/UL — SIGNIFICANT CHANGE UP (ref 4.2–5.8)
RBC # FLD: 13.5 % — SIGNIFICANT CHANGE UP (ref 10.3–14.5)
SODIUM SERPL-SCNC: 138 MMOL/L — SIGNIFICANT CHANGE UP (ref 135–145)
T4 FREE SERPL-MCNC: 1.5 NG/DL — SIGNIFICANT CHANGE UP (ref 0.9–1.8)
TSH SERPL-MCNC: 1.64 UIU/ML — SIGNIFICANT CHANGE UP (ref 0.27–4.2)
WBC # BLD: 5.73 K/UL — SIGNIFICANT CHANGE UP (ref 3.8–10.5)
WBC # FLD AUTO: 5.73 K/UL — SIGNIFICANT CHANGE UP (ref 3.8–10.5)

## 2025-07-16 PROCEDURE — 99214 OFFICE O/P EST MOD 30 MIN: CPT

## 2025-07-16 PROCEDURE — G2211 COMPLEX E/M VISIT ADD ON: CPT

## 2025-07-16 PROCEDURE — 99212 OFFICE O/P EST SF 10 MIN: CPT

## 2025-07-25 ENCOUNTER — RESULT REVIEW (OUTPATIENT)
Age: 65
End: 2025-07-25

## 2025-07-30 ENCOUNTER — APPOINTMENT (OUTPATIENT)
Dept: NUCLEAR MEDICINE | Facility: HOSPITAL | Age: 65
End: 2025-07-30

## 2025-07-30 ENCOUNTER — RESULT REVIEW (OUTPATIENT)
Age: 65
End: 2025-07-30

## 2025-07-30 ENCOUNTER — OUTPATIENT (OUTPATIENT)
Dept: OUTPATIENT SERVICES | Facility: HOSPITAL | Age: 65
LOS: 1 days | End: 2025-07-30
Payer: MEDICARE

## 2025-07-30 DIAGNOSIS — C64.9 MALIGNANT NEOPLASM OF UNSPECIFIED KIDNEY, EXCEPT RENAL PELVIS: ICD-10-CM

## 2025-07-30 DIAGNOSIS — Z95.810 PRESENCE OF AUTOMATIC (IMPLANTABLE) CARDIAC DEFIBRILLATOR: Chronic | ICD-10-CM

## 2025-07-30 PROCEDURE — A9503: CPT

## 2025-07-30 PROCEDURE — 78803 RP LOCLZJ TUM SPECT 1 AREA: CPT | Mod: 26

## 2025-07-30 PROCEDURE — 78803 RP LOCLZJ TUM SPECT 1 AREA: CPT

## 2025-07-30 PROCEDURE — 78306 BONE IMAGING WHOLE BODY: CPT

## 2025-07-30 PROCEDURE — 78306 BONE IMAGING WHOLE BODY: CPT | Mod: 26

## 2025-07-31 ENCOUNTER — OUTPATIENT (OUTPATIENT)
Dept: OUTPATIENT SERVICES | Facility: HOSPITAL | Age: 65
LOS: 1 days | End: 2025-07-31
Payer: MEDICARE

## 2025-07-31 ENCOUNTER — APPOINTMENT (OUTPATIENT)
Dept: MRI IMAGING | Facility: HOSPITAL | Age: 65
End: 2025-07-31

## 2025-07-31 ENCOUNTER — APPOINTMENT (OUTPATIENT)
Dept: CT IMAGING | Facility: HOSPITAL | Age: 65
End: 2025-07-31
Payer: MEDICARE

## 2025-07-31 DIAGNOSIS — Z95.810 PRESENCE OF AUTOMATIC (IMPLANTABLE) CARDIAC DEFIBRILLATOR: Chronic | ICD-10-CM

## 2025-07-31 DIAGNOSIS — C64.9 MALIGNANT NEOPLASM OF UNSPECIFIED KIDNEY, EXCEPT RENAL PELVIS: ICD-10-CM

## 2025-07-31 DIAGNOSIS — Z90.5 ACQUIRED ABSENCE OF KIDNEY: Chronic | ICD-10-CM

## 2025-07-31 PROCEDURE — 71250 CT THORAX DX C-: CPT | Mod: 26

## 2025-07-31 PROCEDURE — 72197 MRI PELVIS W/O & W/DYE: CPT | Mod: 26

## 2025-07-31 PROCEDURE — 71250 CT THORAX DX C-: CPT

## 2025-07-31 PROCEDURE — 72197 MRI PELVIS W/O & W/DYE: CPT

## 2025-07-31 PROCEDURE — 71046 X-RAY EXAM CHEST 2 VIEWS: CPT

## 2025-07-31 PROCEDURE — 71046 X-RAY EXAM CHEST 2 VIEWS: CPT | Mod: 26

## 2025-07-31 PROCEDURE — 74183 MRI ABD W/O CNTR FLWD CNTR: CPT | Mod: 26

## 2025-07-31 PROCEDURE — A9585: CPT

## 2025-07-31 PROCEDURE — 74183 MRI ABD W/O CNTR FLWD CNTR: CPT

## 2025-08-06 ENCOUNTER — RESULT REVIEW (OUTPATIENT)
Age: 65
End: 2025-08-06

## 2025-08-06 ENCOUNTER — APPOINTMENT (OUTPATIENT)
Dept: INFUSION THERAPY | Facility: HOSPITAL | Age: 65
End: 2025-08-06

## 2025-08-06 ENCOUNTER — APPOINTMENT (OUTPATIENT)
Dept: HEMATOLOGY ONCOLOGY | Facility: CLINIC | Age: 65
End: 2025-08-06
Payer: MEDICARE

## 2025-08-06 VITALS
TEMPERATURE: 98.2 F | SYSTOLIC BLOOD PRESSURE: 136 MMHG | HEART RATE: 81 BPM | OXYGEN SATURATION: 99 % | DIASTOLIC BLOOD PRESSURE: 86 MMHG | WEIGHT: 178.55 LBS | BODY MASS INDEX: 27.99 KG/M2 | RESPIRATION RATE: 16 BRPM

## 2025-08-06 DIAGNOSIS — C64.9 MALIGNANT NEOPLASM OF UNSPECIFIED KIDNEY, EXCEPT RENAL PELVIS: ICD-10-CM

## 2025-08-06 PROCEDURE — 99213 OFFICE O/P EST LOW 20 MIN: CPT

## 2025-08-27 ENCOUNTER — RESULT REVIEW (OUTPATIENT)
Age: 65
End: 2025-08-27

## 2025-08-27 ENCOUNTER — APPOINTMENT (OUTPATIENT)
Dept: INFUSION THERAPY | Facility: HOSPITAL | Age: 65
End: 2025-08-27

## 2025-08-27 ENCOUNTER — APPOINTMENT (OUTPATIENT)
Dept: HEMATOLOGY ONCOLOGY | Facility: CLINIC | Age: 65
End: 2025-08-27
Payer: MEDICARE

## 2025-08-27 VITALS
TEMPERATURE: 98.4 F | SYSTOLIC BLOOD PRESSURE: 138 MMHG | HEIGHT: 66.97 IN | OXYGEN SATURATION: 98 % | BODY MASS INDEX: 27.89 KG/M2 | RESPIRATION RATE: 15 BRPM | WEIGHT: 177.67 LBS | HEART RATE: 73 BPM | DIASTOLIC BLOOD PRESSURE: 78 MMHG

## 2025-08-27 DIAGNOSIS — C64.9 MALIGNANT NEOPLASM OF UNSPECIFIED KIDNEY, EXCEPT RENAL PELVIS: ICD-10-CM

## 2025-08-27 PROCEDURE — 99214 OFFICE O/P EST MOD 30 MIN: CPT

## 2025-09-08 ENCOUNTER — NON-APPOINTMENT (OUTPATIENT)
Age: 65
End: 2025-09-08

## 2025-09-17 ENCOUNTER — RESULT REVIEW (OUTPATIENT)
Age: 65
End: 2025-09-17

## 2025-09-17 ENCOUNTER — APPOINTMENT (OUTPATIENT)
Dept: INFUSION THERAPY | Facility: HOSPITAL | Age: 65
End: 2025-09-17

## 2025-09-17 ENCOUNTER — APPOINTMENT (OUTPATIENT)
Dept: HEMATOLOGY ONCOLOGY | Facility: CLINIC | Age: 65
End: 2025-09-17
Payer: MEDICARE

## 2025-09-17 VITALS
DIASTOLIC BLOOD PRESSURE: 90 MMHG | OXYGEN SATURATION: 99 % | BODY MASS INDEX: 28.34 KG/M2 | HEART RATE: 71 BPM | TEMPERATURE: 97.4 F | WEIGHT: 180.78 LBS | RESPIRATION RATE: 16 BRPM | SYSTOLIC BLOOD PRESSURE: 141 MMHG

## 2025-09-17 DIAGNOSIS — C79.31 SECONDARY MALIGNANT NEOPLASM OF BRAIN: ICD-10-CM

## 2025-09-17 PROCEDURE — 99214 OFFICE O/P EST MOD 30 MIN: CPT

## 2025-09-18 ENCOUNTER — LABORATORY RESULT (OUTPATIENT)
Age: 65
End: 2025-09-18

## 2025-09-18 ENCOUNTER — APPOINTMENT (OUTPATIENT)
Dept: NEPHROLOGY | Facility: CLINIC | Age: 65
End: 2025-09-18
Payer: MEDICARE

## 2025-09-18 VITALS
TEMPERATURE: 97.3 F | HEIGHT: 60 IN | BODY MASS INDEX: 35.92 KG/M2 | SYSTOLIC BLOOD PRESSURE: 153 MMHG | DIASTOLIC BLOOD PRESSURE: 84 MMHG | HEART RATE: 67 BPM | OXYGEN SATURATION: 99 % | WEIGHT: 182.98 LBS

## 2025-09-18 VITALS — DIASTOLIC BLOOD PRESSURE: 80 MMHG | SYSTOLIC BLOOD PRESSURE: 136 MMHG

## 2025-09-18 DIAGNOSIS — N18.30 CHRONIC KIDNEY DISEASE, STAGE 3 UNSPECIFIED: ICD-10-CM

## 2025-09-18 DIAGNOSIS — Z90.5 ACQUIRED ABSENCE OF KIDNEY: ICD-10-CM

## 2025-09-18 DIAGNOSIS — C64.9 MALIGNANT NEOPLASM OF UNSPECIFIED KIDNEY, EXCEPT RENAL PELVIS: ICD-10-CM

## 2025-09-18 DIAGNOSIS — I10 ESSENTIAL (PRIMARY) HYPERTENSION: ICD-10-CM

## 2025-09-18 PROCEDURE — G2211 COMPLEX E/M VISIT ADD ON: CPT

## 2025-09-18 PROCEDURE — 99213 OFFICE O/P EST LOW 20 MIN: CPT

## 2025-09-19 LAB
ALBUMIN, RANDOM URINE: <1.2 MG/DL
APPEARANCE: CLEAR
BACTERIA: NEGATIVE /HPF
BILIRUBIN URINE: NEGATIVE
BLOOD URINE: NEGATIVE
CAST: 0 /LPF
COLOR: YELLOW
CREAT SPEC-SCNC: 49 MG/DL
CREAT SPEC-SCNC: 49 MG/DL
CREAT/PROT UR: 0.1 RATIO
EPITHELIAL CELLS: 0 /HPF
GLUCOSE QUALITATIVE U: NEGATIVE MG/DL
KETONES URINE: NEGATIVE MG/DL
LEUKOCYTE ESTERASE URINE: NEGATIVE
MICROALBUMIN/CREAT 24H UR-RTO: NORMAL MG/G
MICROSCOPIC-UA: NORMAL
NITRITE URINE: NEGATIVE
PH URINE: 6
PROT UR-MCNC: 4 MG/DL
PROTEIN URINE: NEGATIVE MG/DL
RED BLOOD CELLS URINE: 0 /HPF
SPECIFIC GRAVITY URINE: 1.01
UROBILINOGEN URINE: 0.2 MG/DL
WHITE BLOOD CELLS URINE: 0 /HPF

## (undated) DEVICE — DRAPE INSTRUMENT POUCH 6.75" X 11"

## (undated) DEVICE — ELCTR BOVIE PENCIL SMOKE EVACUATION

## (undated) DEVICE — LUBRICATING JELLY ONESHOT 1.25OZ

## (undated) DEVICE — XI TIP COVER

## (undated) DEVICE — ADAPTER ESCP CK FLO 9FR STERILE

## (undated) DEVICE — DRAIN JACKSON PRATT 10MM FLAT FULL NO TROCAR

## (undated) DEVICE — TROCAR COVIDIEN VERSASTEP PLUS 15MM STANDARD

## (undated) DEVICE — SHEARS COVIDIEN ENDO SHEAR 5MM X 31CM W UNIPOLAR CAUTERY

## (undated) DEVICE — SUT CAPROSYN 4-0 P-12 UNDYED

## (undated) DEVICE — TROCAR GELPOINT MINI ADVANCED

## (undated) DEVICE — XI SEAL UNIVERSIAL 5-12MM

## (undated) DEVICE — SYR LUER LOK 10CC

## (undated) DEVICE — LUBRICANT INST ELECTROLUBE Z SOLUTION

## (undated) DEVICE — GOWN XL

## (undated) DEVICE — TUBING STRYKEFLOW II SUCTION / IRRIGATOR

## (undated) DEVICE — XI ARM FORCEP TENACULUM

## (undated) DEVICE — XI ARM SCISSOR MONO CURVED

## (undated) DEVICE — RETRACTOR COVIDIEN ENDOPADDLE 12MM DISP

## (undated) DEVICE — XI ARM NEEDLE DRIVER LARGE

## (undated) DEVICE — LAP PAD 4 X 18"

## (undated) DEVICE — XI ARM FORCEP FENESTRATED BIPOLAR 8MM

## (undated) DEVICE — SUT CLIP LAPRA-TY ABSORBABLE SIZE 0.118 TO 0.12" VIOLET

## (undated) DEVICE — XI ARM PERMANENT CAUTERY SPATULA

## (undated) DEVICE — SYR ASEPTO

## (undated) DEVICE — SP COVER CAMERA SHEATH

## (undated) DEVICE — STAPLER SKIN VISI-STAT 35 WIDE

## (undated) DEVICE — APPLICATOR VISTASEAL LAP DUAL 35CM RIGID

## (undated) DEVICE — ELCTR LAPROSCOPIC FLEXIBLE ARGON COAG ONLY 28CM

## (undated) DEVICE — HANDSET ARGON

## (undated) DEVICE — XI ARM CLIP APPLIER LARGE

## (undated) DEVICE — XI ARM PERMANENT CAUTERY HOOK

## (undated) DEVICE — PACK ROBOTIC LIJ

## (undated) DEVICE — DRAIN RESERVOIR FOR JACKSON PRATT 100CC CARDINAL

## (undated) DEVICE — SOL IRR BAG H2O 3000ML

## (undated) DEVICE — GLV 6.5 PROTEXIS (WHITE)

## (undated) DEVICE — POSITIONER FOAM EGG CRATE ULNAR 2PCS (PINK)

## (undated) DEVICE — TUBING RANGER FLUID IRRIGATION SET DISP

## (undated) DEVICE — XI VESSEL SEALER

## (undated) DEVICE — SPECIMEN CONTAINER 100ML

## (undated) DEVICE — DRAPE 3/4 SHEET W REINFORCEMENT 56X77"

## (undated) DEVICE — ELCTR BUGBEE FULGATING 5FR X 58CM

## (undated) DEVICE — TUBING AIRSEAL TRI-LUMEN FILTERED

## (undated) DEVICE — SP DRAPE ARM

## (undated) DEVICE — SP KIT LAP ENTRYGUIDE STND L100X25MM

## (undated) DEVICE — INSUFFLATION NDL COVIDIEN STEP 14G FOR STEP/VERSASTEP

## (undated) DEVICE — LIJ/LIA-HOLDER SCOPE: Type: DURABLE MEDICAL EQUIPMENT

## (undated) DEVICE — SOL IRR POUR NS 0.9% 500ML

## (undated) DEVICE — TIP SCISSOR MCS TIP 10/PK

## (undated) DEVICE — SOL IRR POUR H2O 250ML

## (undated) DEVICE — ELCTR GROUNDING PAD ADULT COVIDIEN

## (undated) DEVICE — SUT POLYSORB 0 36" GU-46

## (undated) DEVICE — GOWN TRIMAX LG

## (undated) DEVICE — DRAPE LIGHT HANDLE COVER (BLUE)

## (undated) DEVICE — DRAPE MAYO STAND 30"

## (undated) DEVICE — SOL IRR BAG NS 0.9% 3000ML

## (undated) DEVICE — ENDOCATCH 10MM SPECIMEN POUCH

## (undated) DEVICE — PACK LAP CHOLE LAP APPENDECTOMY

## (undated) DEVICE — DRAPE BACK TABLE COVER HEAVY DUTY 60"

## (undated) DEVICE — TUBING OLYMPUS INSUFFLATION

## (undated) DEVICE — STAPLER COVIDIEN ENDO GIA STANDARD HANDLE

## (undated) DEVICE — ENDOCATCH II 15MM

## (undated) DEVICE — DRAPE TOWEL BLUE 17" X 24"

## (undated) DEVICE — Device

## (undated) DEVICE — DRAPE 1/2 SHEET 40X57"

## (undated) DEVICE — NDL COUNTER FOAM AND MAGNET 40-70

## (undated) DEVICE — SP MONOPOLAR SCISSOR CURVED 6MM

## (undated) DEVICE — SP ACCESS PORT SMALL INCISION

## (undated) DEVICE — SUT POLYSORB 1 27" GS-21 UNDYED

## (undated) DEVICE — SUT POLYSORB 2-0 30" V-20 UNDYED

## (undated) DEVICE — DRSG STERISTRIPS 0.5 X 4"

## (undated) DEVICE — ADAPTER CHECK FLO 9FR STERILE

## (undated) DEVICE — WARMING BLANKET LOWER ADULT

## (undated) DEVICE — XI DRAPE ARM

## (undated) DEVICE — POSITIONER STRAP ARMBOARD VELCRO TS-30

## (undated) DEVICE — WARMING BLANKET UPPER ADULT

## (undated) DEVICE — MEDICATION LABELS W MARKER

## (undated) DEVICE — XI ARM NEEDLE DRIVER MEGA

## (undated) DEVICE — ACMI SELF-SEALING SEAL UP TO 7FR

## (undated) DEVICE — XI ARM FORCEP PROGRASP 8MM

## (undated) DEVICE — XI ARM CLIP APPLIER MEDIUM-LARGE

## (undated) DEVICE — VENODYNE/SCD SLEEVE CALF LARGE

## (undated) DEVICE — XI ARM FORCEP MARYLAND BIPOLAR

## (undated) DEVICE — SUT VICRYL 0 27" UR-6

## (undated) DEVICE — SP BIPOLAR FORCEP FENSTRATED 6MM

## (undated) DEVICE — SUT VICRYL 2-0 27" SH UNDYED

## (undated) DEVICE — SUT VICRYL 4-0 27" SH UNDYED

## (undated) DEVICE — FOLEY HOLDER STATLOCK 2 WAY ADULT

## (undated) DEVICE — D HELP - CLEARVIEW CLEARIFY SYSTEM

## (undated) DEVICE — XI DRAPE COLUMN

## (undated) DEVICE — TROCAR SURGIQUEST AIRSEAL 12MMX100MM

## (undated) DEVICE — XI ARM DISSECTOR CURVED BIPOLAR 8MM

## (undated) DEVICE — SUT VICRYL 1 36" CT-1 UNDYED

## (undated) DEVICE — SP SHEATH

## (undated) DEVICE — LABELS BLANK W PEN

## (undated) DEVICE — XI OBTURATOR OPTICAL BLADELESS 8MM

## (undated) DEVICE — POSITIONER FOAM OR TABLE PAD CONVOLUTED (PINK)

## (undated) DEVICE — XI ARM GRASPER TIP UP FENESTRATED

## (undated) DEVICE — TAPE SILK 3"

## (undated) DEVICE — SUT POLYSORB 0 60" TIES UNDYED

## (undated) DEVICE — TROCAR SURGIQUEST AIRSEAL 8MMX100MM

## (undated) DEVICE — SUT VLOC 90 3-0 6" CV-23 UNDYED

## (undated) DEVICE — GLV 7.5 PROTEXIS (WHITE)

## (undated) DEVICE — GLV 8 PROTEXIS (WHITE)

## (undated) DEVICE — TUBING IRR SET FOR CYSTOSCOPY 77"

## (undated) DEVICE — SP NEEDLE DRIVER 6MM

## (undated) DEVICE — PROTECTOR HEEL / ELBOW FLUFFY

## (undated) DEVICE — PACK CYSTO

## (undated) DEVICE — DRAPE EQUIPMENT BANDED BAG 30 X 30" (SHOWER CAP)

## (undated) DEVICE — VENODYNE/SCD SLEEVE CALF MEDIUM

## (undated) DEVICE — SUT VLOC 180 2-0 9" GS-21 GREEN

## (undated) DEVICE — GLV 7 PROTEXIS (WHITE)

## (undated) DEVICE — STOPCOCK 4-WAY (BLUE) DISCOFIX SPIN-LOCK CONNECTOR

## (undated) DEVICE — PREP BETADINE KIT

## (undated) DEVICE — DRSG TEGADERM 2.5X3"

## (undated) DEVICE — CANISTER DISPOSABLE THIN WALL 3000CC

## (undated) DEVICE — MARKING PEN W RULER

## (undated) DEVICE — FOLEY TRAY 16FR 5CC LTX UMETER CLOSED

## (undated) DEVICE — XI SEAL UNIV 5- 8 MM